# Patient Record
Sex: MALE | Race: OTHER | NOT HISPANIC OR LATINO
[De-identification: names, ages, dates, MRNs, and addresses within clinical notes are randomized per-mention and may not be internally consistent; named-entity substitution may affect disease eponyms.]

---

## 2019-06-26 DIAGNOSIS — Z00.00 ENCOUNTER FOR GENERAL ADULT MEDICAL EXAMINATION W/OUT ABNORMAL FINDINGS: ICD-10-CM

## 2019-06-26 DIAGNOSIS — H57.9 UNSPECIFIED DISORDER OF EYE AND ADNEXA: ICD-10-CM

## 2019-08-16 ENCOUNTER — MEDICATION RENEWAL (OUTPATIENT)
Age: 79
End: 2019-08-16

## 2019-09-19 ENCOUNTER — RX RENEWAL (OUTPATIENT)
Age: 79
End: 2019-09-19

## 2019-10-15 ENCOUNTER — RX RENEWAL (OUTPATIENT)
Age: 79
End: 2019-10-15

## 2019-10-25 ENCOUNTER — RX RENEWAL (OUTPATIENT)
Age: 79
End: 2019-10-25

## 2019-11-13 ENCOUNTER — RX RENEWAL (OUTPATIENT)
Age: 79
End: 2019-11-13

## 2019-11-20 ENCOUNTER — APPOINTMENT (OUTPATIENT)
Dept: CARDIOLOGY | Facility: CLINIC | Age: 79
End: 2019-11-20

## 2019-12-17 ENCOUNTER — RX RENEWAL (OUTPATIENT)
Age: 79
End: 2019-12-17

## 2020-01-14 ENCOUNTER — APPOINTMENT (OUTPATIENT)
Dept: CARDIOLOGY | Facility: CLINIC | Age: 80
End: 2020-01-14

## 2020-02-19 ENCOUNTER — APPOINTMENT (OUTPATIENT)
Dept: CARDIOLOGY | Facility: CLINIC | Age: 80
End: 2020-02-19

## 2020-06-17 ENCOUNTER — APPOINTMENT (OUTPATIENT)
Dept: CARDIOLOGY | Facility: CLINIC | Age: 80
End: 2020-06-17

## 2020-09-07 ENCOUNTER — RX RENEWAL (OUTPATIENT)
Age: 80
End: 2020-09-07

## 2020-09-15 ENCOUNTER — RX RENEWAL (OUTPATIENT)
Age: 80
End: 2020-09-15

## 2020-09-18 ENCOUNTER — LABORATORY RESULT (OUTPATIENT)
Age: 80
End: 2020-09-18

## 2020-09-18 ENCOUNTER — NON-APPOINTMENT (OUTPATIENT)
Age: 80
End: 2020-09-18

## 2020-09-18 ENCOUNTER — APPOINTMENT (OUTPATIENT)
Dept: CARDIOLOGY | Facility: CLINIC | Age: 80
End: 2020-09-18
Payer: MEDICARE

## 2020-09-18 VITALS
WEIGHT: 155 LBS | HEIGHT: 68 IN | HEART RATE: 57 BPM | SYSTOLIC BLOOD PRESSURE: 108 MMHG | OXYGEN SATURATION: 96 % | BODY MASS INDEX: 23.49 KG/M2 | DIASTOLIC BLOOD PRESSURE: 56 MMHG

## 2020-09-18 DIAGNOSIS — Z12.5 ENCOUNTER FOR SCREENING FOR MALIGNANT NEOPLASM OF PROSTATE: ICD-10-CM

## 2020-09-18 DIAGNOSIS — Z82.49 FAMILY HISTORY OF ISCHEMIC HEART DISEASE AND OTHER DISEASES OF THE CIRCULATORY SYSTEM: ICD-10-CM

## 2020-09-18 DIAGNOSIS — Z20.828 CONTACT WITH AND (SUSPECTED) EXPOSURE TO OTHER VIRAL COMMUNICABLE DISEASES: ICD-10-CM

## 2020-09-18 DIAGNOSIS — R00.1 BRADYCARDIA, UNSPECIFIED: ICD-10-CM

## 2020-09-18 DIAGNOSIS — Z86.79 PERSONAL HISTORY OF OTHER DISEASES OF THE CIRCULATORY SYSTEM: ICD-10-CM

## 2020-09-18 DIAGNOSIS — Z78.9 OTHER SPECIFIED HEALTH STATUS: ICD-10-CM

## 2020-09-18 DIAGNOSIS — Z82.5 FAMILY HISTORY OF ASTHMA AND OTHER CHRONIC LOWER RESPIRATORY DISEASES: ICD-10-CM

## 2020-09-18 PROCEDURE — 90662 IIV NO PRSV INCREASED AG IM: CPT

## 2020-09-18 PROCEDURE — G0008: CPT

## 2020-09-18 PROCEDURE — 99397 PER PM REEVAL EST PAT 65+ YR: CPT

## 2020-09-18 PROCEDURE — 93000 ELECTROCARDIOGRAM COMPLETE: CPT

## 2020-09-18 RX ORDER — ZOSTER VACCINE RECOMBINANT, ADJUVANTED 50 MCG/0.5
50 KIT INTRAMUSCULAR
Qty: 2 | Refills: 0 | Status: ACTIVE | COMMUNITY
Start: 2020-09-18 | End: 1900-01-01

## 2020-09-18 NOTE — HISTORY OF PRESENT ILLNESS
[FreeTextEntry1] : Annual Wellness Exam  [de-identified] : This is an 80 year old gentlemen with a PMH of HTN, HLD, BPH, VitamiN D Deficiency, Spinal Stenosis, and CAD presents today for annual wellness exam. Patient states that he is overall feeling good, however, he has been feeling more exhausted for the past few months. Patient denies dyspnea, palpitations, chest pain, nausea, vomiting, dizziness and lightheadedness.\par

## 2020-09-19 ENCOUNTER — APPOINTMENT (OUTPATIENT)
Dept: CARDIOLOGY | Facility: CLINIC | Age: 80
End: 2020-09-19
Payer: MEDICARE

## 2020-09-19 PROCEDURE — 93306 TTE W/DOPPLER COMPLETE: CPT

## 2020-12-14 DIAGNOSIS — Z71.89 OTHER SPECIFIED COUNSELING: ICD-10-CM

## 2020-12-27 PROBLEM — Z71.89 IMMUNIZATION COUNSELING: Status: ACTIVE | Noted: 2020-12-27

## 2020-12-27 LAB
25(OH)D3 SERPL-MCNC: 49.5 NG/ML
ALBUMIN SERPL ELPH-MCNC: 4.7 G/DL
ALP BLD-CCNC: 77 U/L
ALT SERPL-CCNC: 22 U/L
ANION GAP SERPL CALC-SCNC: 14 MMOL/L
APPEARANCE: CLEAR
AST SERPL-CCNC: 32 U/L
BACTERIA: NEGATIVE
BASOPHILS # BLD AUTO: 0.05 K/UL
BASOPHILS NFR BLD AUTO: 0.8 %
BILIRUB DIRECT SERPL-MCNC: 0.1 MG/DL
BILIRUB INDIRECT SERPL-MCNC: 0.1 MG/DL
BILIRUB SERPL-MCNC: 0.2 MG/DL
BILIRUBIN URINE: NEGATIVE
BLOOD URINE: NEGATIVE
BUN SERPL-MCNC: 17 MG/DL
CALCIUM SERPL-MCNC: 10.1 MG/DL
CHLORIDE SERPL-SCNC: 103 MMOL/L
CHOLEST SERPL-MCNC: 166 MG/DL
CHOLEST/HDLC SERPL: 2.2 RATIO
CK SERPL-CCNC: 195 U/L
CO2 SERPL-SCNC: 25 MMOL/L
COLOR: NORMAL
CREAT SERPL-MCNC: 1.04 MG/DL
EOSINOPHIL # BLD AUTO: 0.24 K/UL
EOSINOPHIL NFR BLD AUTO: 3.8 %
ESTIMATED AVERAGE GLUCOSE: 123 MG/DL
FERRITIN SERPL-MCNC: 88 NG/ML
FOLATE SERPL-MCNC: 19.8 NG/ML
GLUCOSE QUALITATIVE U: NEGATIVE
GLUCOSE SERPL-MCNC: 98 MG/DL
HAPTOGLOB SERPL-MCNC: 105 MG/DL
HBA1C MFR BLD HPLC: 5.9 %
HCT VFR BLD CALC: 40.1 %
HDLC SERPL-MCNC: 74 MG/DL
HGB BLD-MCNC: 12.7 G/DL
HYALINE CASTS: 0 /LPF
IMM GRANULOCYTES NFR BLD AUTO: 0.3 %
IRON SERPL-MCNC: 79 UG/DL
KETONES URINE: NEGATIVE
LDH SERPL-CCNC: 205 U/L
LDLC SERPL CALC-MCNC: 72 MG/DL
LEUKOCYTE ESTERASE URINE: NEGATIVE
LYMPHOCYTES # BLD AUTO: 1.12 K/UL
LYMPHOCYTES NFR BLD AUTO: 17.5 %
MAGNESIUM SERPL-MCNC: 2.3 MG/DL
MAN DIFF?: NORMAL
MCHC RBC-ENTMCNC: 30.5 PG
MCHC RBC-ENTMCNC: 31.7 GM/DL
MCV RBC AUTO: 96.2 FL
MICROSCOPIC-UA: NORMAL
MONOCYTES # BLD AUTO: 0.56 K/UL
MONOCYTES NFR BLD AUTO: 8.8 %
NEUTROPHILS # BLD AUTO: 4.4 K/UL
NEUTROPHILS NFR BLD AUTO: 68.8 %
NITRITE URINE: NEGATIVE
PH URINE: 7.5
PHOSPHATE SERPL-MCNC: 3.4 MG/DL
PLATELET # BLD AUTO: 235 K/UL
POTASSIUM SERPL-SCNC: 5.2 MMOL/L
PROT SERPL-MCNC: 7.7 G/DL
PROTEIN URINE: NEGATIVE
PSA SERPL-MCNC: 8.74 NG/ML
RBC # BLD: 4.17 M/UL
RBC # FLD: 14.2 %
RED BLOOD CELLS URINE: 1 /HPF
SARS-COV-2 IGG SERPL IA-ACNC: 0.02 INDEX
SARS-COV-2 IGG SERPL QL IA: NEGATIVE
SODIUM SERPL-SCNC: 142 MMOL/L
SPECIFIC GRAVITY URINE: 1.02
SQUAMOUS EPITHELIAL CELLS: 0 /HPF
T3RU NFR SERPL: 1.1 TBI
T4 FREE SERPL-MCNC: 1 NG/DL
T4 SERPL-MCNC: 5 UG/DL
TRANSFERRIN SERPL-MCNC: 272 MG/DL
TRIGL SERPL-MCNC: 100 MG/DL
TSH SERPL-ACNC: 2.79 UIU/ML
URATE SERPL-MCNC: 4.9 MG/DL
UROBILINOGEN URINE: NORMAL
VIT B12 SERPL-MCNC: >2000 PG/ML
WBC # FLD AUTO: 6.39 K/UL
WHITE BLOOD CELLS URINE: 0 /HPF

## 2020-12-27 RX ORDER — ZOSTER VACCINE RECOMBINANT, ADJUVANTED 50 MCG/0.5
50 KIT INTRAMUSCULAR
Qty: 1 | Refills: 0 | Status: ACTIVE | COMMUNITY
Start: 2020-12-27 | End: 1900-01-01

## 2021-10-08 ENCOUNTER — APPOINTMENT (OUTPATIENT)
Dept: CARDIOLOGY | Facility: CLINIC | Age: 81
End: 2021-10-08
Payer: MEDICARE

## 2021-10-08 ENCOUNTER — LABORATORY RESULT (OUTPATIENT)
Age: 81
End: 2021-10-08

## 2021-10-08 ENCOUNTER — NON-APPOINTMENT (OUTPATIENT)
Age: 81
End: 2021-10-08

## 2021-10-08 VITALS
SYSTOLIC BLOOD PRESSURE: 148 MMHG | OXYGEN SATURATION: 96 % | TEMPERATURE: 97.8 F | HEART RATE: 72 BPM | DIASTOLIC BLOOD PRESSURE: 86 MMHG | WEIGHT: 153 LBS | BODY MASS INDEX: 23.26 KG/M2

## 2021-10-08 DIAGNOSIS — Z23 ENCOUNTER FOR IMMUNIZATION: ICD-10-CM

## 2021-10-08 DIAGNOSIS — Z00.00 ENCOUNTER FOR GENERAL ADULT MEDICAL EXAMINATION W/OUT ABNORMAL FINDINGS: ICD-10-CM

## 2021-10-08 DIAGNOSIS — Z12.83 ENCOUNTER FOR SCREENING FOR MALIGNANT NEOPLASM OF SKIN: ICD-10-CM

## 2021-10-08 DIAGNOSIS — Z12.11 ENCOUNTER FOR SCREENING FOR MALIGNANT NEOPLASM OF COLON: ICD-10-CM

## 2021-10-08 PROCEDURE — 99397 PER PM REEVAL EST PAT 65+ YR: CPT

## 2021-10-08 PROCEDURE — 90662 IIV NO PRSV INCREASED AG IM: CPT

## 2021-10-08 PROCEDURE — G0008: CPT

## 2021-10-08 PROCEDURE — 93000 ELECTROCARDIOGRAM COMPLETE: CPT

## 2021-10-08 RX ORDER — ASPIRIN 81 MG/1
81 TABLET, COATED ORAL
Refills: 0 | Status: ACTIVE | COMMUNITY
Start: 2021-10-08

## 2021-10-08 NOTE — HISTORY OF PRESENT ILLNESS
[FreeTextEntry1] : Annual Wellness exam  [de-identified] : This is an 81 year old gentlemen with a PMH of   HTN, HLD, BPH, VitamiN D Deficiency, Spinal Stenosis, and CAD presents today for annual wellness exam. Patient states that he is overall feeling good.  Patient denies dyspnea, palpitations, chest pain, nausea, vomiting, dizziness and lightheadedness.\par  \par

## 2022-01-15 ENCOUNTER — TRANSCRIPTION ENCOUNTER (OUTPATIENT)
Age: 82
End: 2022-01-15

## 2022-06-15 ENCOUNTER — TRANSCRIPTION ENCOUNTER (OUTPATIENT)
Age: 82
End: 2022-06-15

## 2022-08-26 ENCOUNTER — NON-APPOINTMENT (OUTPATIENT)
Age: 82
End: 2022-08-26

## 2023-01-19 ENCOUNTER — RX RENEWAL (OUTPATIENT)
Age: 83
End: 2023-01-19

## 2023-02-21 ENCOUNTER — APPOINTMENT (OUTPATIENT)
Dept: CARDIOLOGY | Facility: CLINIC | Age: 83
End: 2023-02-21

## 2023-02-24 ENCOUNTER — APPOINTMENT (OUTPATIENT)
Dept: INTERNAL MEDICINE | Facility: CLINIC | Age: 83
End: 2023-02-24
Payer: MEDICARE

## 2023-02-24 ENCOUNTER — NON-APPOINTMENT (OUTPATIENT)
Age: 83
End: 2023-02-24

## 2023-02-24 VITALS
SYSTOLIC BLOOD PRESSURE: 122 MMHG | HEIGHT: 68 IN | HEART RATE: 81 BPM | DIASTOLIC BLOOD PRESSURE: 76 MMHG | OXYGEN SATURATION: 98 % | WEIGHT: 146 LBS | BODY MASS INDEX: 22.13 KG/M2

## 2023-02-24 PROCEDURE — 99204 OFFICE O/P NEW MOD 45 MIN: CPT | Mod: 25

## 2023-02-24 PROCEDURE — 93000 ELECTROCARDIOGRAM COMPLETE: CPT

## 2023-02-24 RX ORDER — FLUOXETINE HYDROCHLORIDE 20 MG/1
20 CAPSULE ORAL
Qty: 90 | Refills: 0 | Status: ACTIVE | COMMUNITY
Start: 2019-08-16 | End: 1900-01-01

## 2023-02-24 NOTE — HISTORY OF PRESENT ILLNESS
[Family Member] : family member [FreeTextEntry1] : follow-up [de-identified] : 82 year old male with PMHx of CAD, HTN, HLD, BPH with elevated PSA who presents for a f/u visit (first visit with me). Currently he has no acute medical complaints and states he has been compliant with all medications.

## 2023-02-24 NOTE — PLAN
[FreeTextEntry1] : CAD s/p PCI - c/w ASA 81 mg, refer for ECHO, f/u with cardiology\par HTN - diet controlled\par HLD - c/w zocor, repeat lipid panel\par Dyspepsia - pt requests pantoprazole\par Anxiety - c/w fluoxetine\par BPH w/ elevated PSA - pt f/u with outside urologist, c/w tamsulosin and finasteride, repeat PSA

## 2023-02-24 NOTE — REVIEW OF SYSTEMS
[Fever] : no fever [Night Sweats] : no night sweats [Discharge] : no discharge [Vision Problems] : no vision problems [Earache] : no earache [Nasal Discharge] : no nasal discharge [Chest Pain] : no chest pain [Orthopena] : no orthopnea [Shortness Of Breath] : no shortness of breath [Abdominal Pain] : no abdominal pain [Vomiting] : no vomiting [Dysuria] : no dysuria [Hematuria] : no hematuria [Joint Pain] : no joint pain [Back Pain] : no back pain [Itching] : no itching [Skin Rash] : no skin rash [Headache] : no headache [Memory Loss] : no memory loss

## 2023-02-24 NOTE — PHYSICAL EXAM
[No Acute Distress] : no acute distress [Normal Sclera/Conjunctiva] : normal sclera/conjunctiva [Normal Outer Ear/Nose] : the outer ears and nose were normal in appearance [No JVD] : no jugular venous distention [No Lymphadenopathy] : no lymphadenopathy [No Respiratory Distress] : no respiratory distress  [No Accessory Muscle Use] : no accessory muscle use [Clear to Auscultation] : lungs were clear to auscultation bilaterally [Normal Rate] : normal rate  [Regular Rhythm] : with a regular rhythm [Normal S1, S2] : normal S1 and S2 [No Edema] : there was no peripheral edema [Soft] : abdomen soft [Non Tender] : non-tender [No CVA Tenderness] : no CVA  tenderness [No Spinal Tenderness] : no spinal tenderness [No Joint Swelling] : no joint swelling [No Rash] : no rash

## 2023-02-27 ENCOUNTER — APPOINTMENT (OUTPATIENT)
Dept: CARDIOLOGY | Facility: CLINIC | Age: 83
End: 2023-02-27

## 2023-03-01 ENCOUNTER — NON-APPOINTMENT (OUTPATIENT)
Age: 83
End: 2023-03-01

## 2023-03-01 LAB
ALBUMIN SERPL ELPH-MCNC: 4.5 G/DL
ALP BLD-CCNC: 98 U/L
ALT SERPL-CCNC: 20 U/L
ANION GAP SERPL CALC-SCNC: 12 MMOL/L
AST SERPL-CCNC: 27 U/L
BASOPHILS # BLD AUTO: 0.04 K/UL
BASOPHILS NFR BLD AUTO: 0.7 %
BILIRUB DIRECT SERPL-MCNC: 0.1 MG/DL
BILIRUB INDIRECT SERPL-MCNC: 0.1 MG/DL
BILIRUB SERPL-MCNC: 0.2 MG/DL
BUN SERPL-MCNC: 12 MG/DL
CALCIUM SERPL-MCNC: 9.9 MG/DL
CHLORIDE SERPL-SCNC: 104 MMOL/L
CHOLEST SERPL-MCNC: 189 MG/DL
CO2 SERPL-SCNC: 27 MMOL/L
CREAT SERPL-MCNC: 1.06 MG/DL
EGFR: 69 ML/MIN/1.73M2
EOSINOPHIL # BLD AUTO: 0.11 K/UL
EOSINOPHIL NFR BLD AUTO: 2 %
ESTIMATED AVERAGE GLUCOSE: 123 MG/DL
GLUCOSE SERPL-MCNC: 97 MG/DL
HBA1C MFR BLD HPLC: 5.9 %
HCT VFR BLD CALC: 41 %
HDLC SERPL-MCNC: 72 MG/DL
HGB BLD-MCNC: 13.1 G/DL
IMM GRANULOCYTES NFR BLD AUTO: 0.2 %
LDLC SERPL CALC-MCNC: 95 MG/DL
LYMPHOCYTES # BLD AUTO: 0.94 K/UL
LYMPHOCYTES NFR BLD AUTO: 17.2 %
MAN DIFF?: NORMAL
MCHC RBC-ENTMCNC: 30.7 PG
MCHC RBC-ENTMCNC: 32 GM/DL
MCV RBC AUTO: 96 FL
MONOCYTES # BLD AUTO: 0.57 K/UL
MONOCYTES NFR BLD AUTO: 10.5 %
NEUTROPHILS # BLD AUTO: 3.78 K/UL
NEUTROPHILS NFR BLD AUTO: 69.4 %
NONHDLC SERPL-MCNC: 117 MG/DL
PLATELET # BLD AUTO: 240 K/UL
POTASSIUM SERPL-SCNC: 4.8 MMOL/L
PROT SERPL-MCNC: 6.8 G/DL
PSA FREE FLD-MCNC: 14 %
PSA FREE SERPL-MCNC: 4.78 NG/ML
PSA SERPL-MCNC: 35.1 NG/ML
RBC # BLD: 4.27 M/UL
RBC # FLD: 14.6 %
SODIUM SERPL-SCNC: 143 MMOL/L
TRIGL SERPL-MCNC: 109 MG/DL
TSH SERPL-ACNC: 3.4 UIU/ML
WBC # FLD AUTO: 5.45 K/UL

## 2023-08-23 ENCOUNTER — APPOINTMENT (OUTPATIENT)
Dept: INTERNAL MEDICINE | Facility: CLINIC | Age: 83
End: 2023-08-23
Payer: MEDICARE

## 2023-08-23 VITALS
WEIGHT: 140 LBS | DIASTOLIC BLOOD PRESSURE: 72 MMHG | BODY MASS INDEX: 21.22 KG/M2 | RESPIRATION RATE: 16 BRPM | SYSTOLIC BLOOD PRESSURE: 122 MMHG | HEART RATE: 77 BPM | HEIGHT: 68 IN | TEMPERATURE: 97.8 F | OXYGEN SATURATION: 98 %

## 2023-08-23 DIAGNOSIS — M54.50 LOW BACK PAIN, UNSPECIFIED: ICD-10-CM

## 2023-08-23 DIAGNOSIS — R10.13 EPIGASTRIC PAIN: ICD-10-CM

## 2023-08-23 PROCEDURE — 99214 OFFICE O/P EST MOD 30 MIN: CPT

## 2023-08-23 NOTE — PHYSICAL EXAM
[No Acute Distress] : no acute distress [Normal Sclera/Conjunctiva] : normal sclera/conjunctiva [Normal Outer Ear/Nose] : the outer ears and nose were normal in appearance [No JVD] : no jugular venous distention [No Lymphadenopathy] : no lymphadenopathy [No Respiratory Distress] : no respiratory distress  [No Accessory Muscle Use] : no accessory muscle use [Clear to Auscultation] : lungs were clear to auscultation bilaterally [Normal Rate] : normal rate  [Regular Rhythm] : with a regular rhythm [Normal S1, S2] : normal S1 and S2 [No Edema] : there was no peripheral edema [Soft] : abdomen soft [No CVA Tenderness] : no CVA  tenderness [No Joint Swelling] : no joint swelling [No Rash] : no rash [de-identified] : slow to get up from seated position

## 2023-08-23 NOTE — HISTORY OF PRESENT ILLNESS
[Family Member] : family member [FreeTextEntry1] : back pain [de-identified] : 83 year old male here for a f/u visit. Pt c/o low back pain for several months. The pain is worse when he bends to lift items and getting up from a seated/lying down position. He denies any chest pain, palpitations, shortness of breath.  He takes ibuprofen for pain relief with resolution of symptoms.

## 2023-08-23 NOTE — REVIEW OF SYSTEMS
[Fever] : no fever [Night Sweats] : no night sweats [Discharge] : no discharge [Vision Problems] : no vision problems [Earache] : no earache [Nasal Discharge] : no nasal discharge [Chest Pain] : no chest pain [Palpitations] : no palpitations [Orthopena] : no orthopnea [Paroxysmal Nocturnal Dyspnea] : no paroxysmal nocturnal dyspnea [Shortness Of Breath] : no shortness of breath [Wheezing] : no wheezing [Heartburn] : heartburn [Dysuria] : no dysuria [Hematuria] : no hematuria [Joint Pain] : no joint pain [Joint Stiffness] : joint stiffness [Back Pain] : back pain [Joint Swelling] : joint swelling [Itching] : no itching [Skin Rash] : no skin rash [Headache] : no headache [Memory Loss] : no memory loss [Suicidal] : not suicidal [Easy Bleeding] : no easy bleeding

## 2023-08-23 NOTE — PLAN
[FreeTextEntry1] : Low back pain/spinal stenosis - will check xrays, advise NSAIDs, consider PT referral if xrays are negative Elevated PSA - pt reports he follows with Dr. Potts (had appt earlier this year). Will repeat PSA today.  c/w finasteride/tamsulosin HLD - c/w zocor, check lipid panel Dyspepsia - pt requests pantoprazole, refer to GI as well HMT - will check screening labs and f/u

## 2023-08-25 ENCOUNTER — OUTPATIENT (OUTPATIENT)
Dept: OUTPATIENT SERVICES | Facility: HOSPITAL | Age: 83
LOS: 1 days | End: 2023-08-25
Payer: MEDICARE

## 2023-08-25 DIAGNOSIS — M54.50 LOW BACK PAIN, UNSPECIFIED: ICD-10-CM

## 2023-08-25 LAB
ALBUMIN SERPL ELPH-MCNC: 4.4 G/DL
ALP BLD-CCNC: 110 U/L
ALT SERPL-CCNC: 14 U/L
ANION GAP SERPL CALC-SCNC: 17 MMOL/L
APPEARANCE: CLEAR
AST SERPL-CCNC: 23 U/L
BACTERIA: NEGATIVE /HPF
BILIRUB DIRECT SERPL-MCNC: 0.1 MG/DL
BILIRUB INDIRECT SERPL-MCNC: 0.3 MG/DL
BILIRUB SERPL-MCNC: 0.4 MG/DL
BILIRUBIN URINE: NEGATIVE
BLOOD URINE: NEGATIVE
BUN SERPL-MCNC: 15 MG/DL
CALCIUM SERPL-MCNC: 9.8 MG/DL
CAST: 0 /LPF
CHLORIDE SERPL-SCNC: 102 MMOL/L
CHOLEST SERPL-MCNC: 161 MG/DL
CO2 SERPL-SCNC: 24 MMOL/L
COLOR: YELLOW
CREAT SERPL-MCNC: 1.12 MG/DL
EGFR: 64 ML/MIN/1.73M2
EPITHELIAL CELLS: 1 /HPF
ESTIMATED AVERAGE GLUCOSE: 117 MG/DL
GLUCOSE QUALITATIVE U: NEGATIVE MG/DL
GLUCOSE SERPL-MCNC: 123 MG/DL
HBA1C MFR BLD HPLC: 5.7 %
HDLC SERPL-MCNC: 78 MG/DL
KETONES URINE: ABNORMAL MG/DL
LDLC SERPL CALC-MCNC: 71 MG/DL
LEUKOCYTE ESTERASE URINE: NEGATIVE
MICROSCOPIC-UA: NORMAL
NITRITE URINE: NEGATIVE
NONHDLC SERPL-MCNC: 83 MG/DL
PH URINE: 6
POTASSIUM SERPL-SCNC: 3.8 MMOL/L
PROT SERPL-MCNC: 7.7 G/DL
PROTEIN URINE: NEGATIVE MG/DL
PSA FREE FLD-MCNC: 17 %
PSA FREE SERPL-MCNC: 14.1 NG/ML
PSA SERPL-MCNC: 83.7 NG/ML
RED BLOOD CELLS URINE: 1 /HPF
SODIUM SERPL-SCNC: 143 MMOL/L
SPECIFIC GRAVITY URINE: 1.01
TRIGL SERPL-MCNC: 62 MG/DL
TSH SERPL-ACNC: 4.36 UIU/ML
UROBILINOGEN URINE: 1 MG/DL
WHITE BLOOD CELLS URINE: 1 /HPF

## 2023-08-25 PROCEDURE — 72110 X-RAY EXAM L-2 SPINE 4/>VWS: CPT | Mod: 26

## 2023-09-07 ENCOUNTER — APPOINTMENT (OUTPATIENT)
Dept: GASTROENTEROLOGY | Facility: CLINIC | Age: 83
End: 2023-09-07
Payer: MEDICARE

## 2023-09-07 VITALS
TEMPERATURE: 98 F | OXYGEN SATURATION: 98 % | DIASTOLIC BLOOD PRESSURE: 68 MMHG | BODY MASS INDEX: 22.58 KG/M2 | HEIGHT: 68 IN | SYSTOLIC BLOOD PRESSURE: 116 MMHG | HEART RATE: 85 BPM | WEIGHT: 149 LBS | RESPIRATION RATE: 16 BRPM

## 2023-09-07 DIAGNOSIS — N40.1 BENIGN PROSTATIC HYPERPLASIA WITH LOWER URINARY TRACT SYMPMS: ICD-10-CM

## 2023-09-07 DIAGNOSIS — R13.14 DYSPHAGIA, PHARYNGOESOPHAGEAL PHASE: ICD-10-CM

## 2023-09-07 DIAGNOSIS — Z86.39 PERSONAL HISTORY OF OTHER ENDOCRINE, NUTRITIONAL AND METABOLIC DISEASE: ICD-10-CM

## 2023-09-07 PROCEDURE — 99204 OFFICE O/P NEW MOD 45 MIN: CPT

## 2023-09-07 NOTE — CONSULT LETTER
[Dear  ___] : Dear  [unfilled], [Consult Letter:] : I had the pleasure of evaluating your patient, [unfilled]. [( Thank you for referring [unfilled] for consultation for _____ )] : Thank you for referring [unfilled] for consultation for [unfilled] [Please see my note below.] : Please see my note below. [Consult Closing:] : Thank you very much for allowing me to participate in the care of this patient.  If you have any questions, please do not hesitate to contact me. [Sincerely,] : Sincerely, [FreeTextEntry3] : Donald Sifuentes MD  Gastroenterology [facility]

## 2023-09-07 NOTE — HISTORY OF PRESENT ILLNESS
[FreeTextEntry1] : He is an 83-year-old male with a long history of intermittent difficulty swallowing solid food.  The food gets stuck in the back of his pharynx.  He is able to drink liquids.  He also admits to heartburn.  He is on  on pantoprazole 40 mg daily.  He denies abdominal pain.  He had an endoscopy 20 years ago and had a dilation of his esophagus.  His son was in the room

## 2023-09-07 NOTE — ASSESSMENT
[FreeTextEntry1] : He is an 83-year-old male with a long history of solid food intermittently getting stuck in the back of his pharynx which is worse recently.  He most likely has a Zenker's diverticulum.  However, we must rule out a structural lesion or stricture.  He also  may have lost the ability to coordinate his pharyngeal and esophageal contractions resulting in food getting stuck in the back of his pharynx.  WHIT MATTHEWS was advised to undergo endoscopy to which he agreed. The procedure will be performed in [facility] with the assistance of an anesthesiologist. He was given a booklet distributed by the [employer] explaining the procedure in detail and he understood the risks of the procedure not limited to infection, bleeding, perforation or non- diagnosis of gastric or esophageal cancer.  He was advised that he could not drive home, if he chooses to receive sedation. Further diagnostic and treatment recommendations will be based upon the procedure and any biopsies, if they are taken. Thank you for allowing me to participate in this [facility] care.   I spent 48 minutes with the patient and his son and answered all the questions

## 2023-09-08 ENCOUNTER — APPOINTMENT (OUTPATIENT)
Dept: UROLOGY | Facility: CLINIC | Age: 83
End: 2023-09-08
Payer: MEDICARE

## 2023-09-08 VITALS
HEIGHT: 68 IN | TEMPERATURE: 97.2 F | HEART RATE: 74 BPM | DIASTOLIC BLOOD PRESSURE: 85 MMHG | WEIGHT: 150 LBS | BODY MASS INDEX: 22.73 KG/M2 | RESPIRATION RATE: 16 BRPM | SYSTOLIC BLOOD PRESSURE: 134 MMHG

## 2023-09-08 PROCEDURE — 99204 OFFICE O/P NEW MOD 45 MIN: CPT

## 2023-09-08 PROCEDURE — 51798 US URINE CAPACITY MEASURE: CPT

## 2023-09-09 LAB
APPEARANCE: CLEAR
BACTERIA: NEGATIVE /HPF
BILIRUBIN URINE: NEGATIVE
BLOOD URINE: NEGATIVE
CAST: 2 /LPF
COLOR: NORMAL
EPITHELIAL CELLS: 2 /HPF
GLUCOSE QUALITATIVE U: NEGATIVE MG/DL
KETONES URINE: ABNORMAL MG/DL
LEUKOCYTE ESTERASE URINE: NEGATIVE
MICROSCOPIC-UA: NORMAL
NITRITE URINE: NEGATIVE
PH URINE: 5.5
PROTEIN URINE: NORMAL MG/DL
RED BLOOD CELLS URINE: 1 /HPF
SPECIFIC GRAVITY URINE: 1.02
UROBILINOGEN URINE: 1 MG/DL
WHITE BLOOD CELLS URINE: 1 /HPF

## 2023-09-09 NOTE — HISTORY OF PRESENT ILLNESS
[FreeTextEntry1] : referred for evaluation of an elevated PSA presently 81; had been 35 2/23 and 11.8 2021 never had a PNB before. has lost 10+ lbs though his appetite is down and complains of LBP for the past month. he is on finasterdie and tamsulosin for LUTs.  he notes frequency every 2 hiurs, some urgency and can have leakage with nocturia 3-4 times. he FOS slower with some hesitancy and intermittency. No h/o retention, hematuria or UTIs.   PVR 8cc.

## 2023-09-09 NOTE — ASSESSMENT
[FreeTextEntry1] : based on PSA level on finasteride would be concerned about prostate cancer, likely advanced. plan for expedited MRI pelvis and needs biopsy. reviewed therapies with ADT the foremost though he is 83 so side effects less.

## 2023-09-10 LAB — BACTERIA UR CULT: NORMAL

## 2023-09-11 RX ORDER — NAPROXEN 250 MG/1
250 TABLET ORAL
Qty: 30 | Refills: 0 | Status: ACTIVE | COMMUNITY
Start: 2023-08-23 | End: 1900-01-01

## 2023-09-21 ENCOUNTER — APPOINTMENT (OUTPATIENT)
Dept: INTERNAL MEDICINE | Facility: CLINIC | Age: 83
End: 2023-09-21
Payer: MEDICARE

## 2023-09-21 VITALS
BODY MASS INDEX: 22.73 KG/M2 | OXYGEN SATURATION: 98 % | SYSTOLIC BLOOD PRESSURE: 112 MMHG | WEIGHT: 150 LBS | HEART RATE: 80 BPM | HEIGHT: 68 IN | DIASTOLIC BLOOD PRESSURE: 80 MMHG

## 2023-09-21 DIAGNOSIS — R53.83 OTHER FATIGUE: ICD-10-CM

## 2023-09-21 DIAGNOSIS — R35.1 BENIGN PROSTATIC HYPERPLASIA WITH LOWER URINARY TRACT SYMPMS: ICD-10-CM

## 2023-09-21 DIAGNOSIS — N40.1 BENIGN PROSTATIC HYPERPLASIA WITH LOWER URINARY TRACT SYMPMS: ICD-10-CM

## 2023-09-21 PROCEDURE — 99496 TRANSJ CARE MGMT HIGH F2F 7D: CPT

## 2023-09-21 PROCEDURE — 99214 OFFICE O/P EST MOD 30 MIN: CPT

## 2023-09-28 ENCOUNTER — OUTPATIENT (OUTPATIENT)
Dept: OUTPATIENT SERVICES | Facility: HOSPITAL | Age: 83
LOS: 1 days | End: 2023-09-28
Payer: MEDICARE

## 2023-09-28 ENCOUNTER — RESULT REVIEW (OUTPATIENT)
Age: 83
End: 2023-09-28

## 2023-09-28 ENCOUNTER — APPOINTMENT (OUTPATIENT)
Dept: MRI IMAGING | Facility: CLINIC | Age: 83
End: 2023-09-28
Payer: MEDICARE

## 2023-09-28 DIAGNOSIS — R97.20 ELEVATED PROSTATE SPECIFIC ANTIGEN [PSA]: ICD-10-CM

## 2023-09-28 PROCEDURE — 72197 MRI PELVIS W/O & W/DYE: CPT | Mod: 26

## 2023-09-28 PROCEDURE — 72197 MRI PELVIS W/O & W/DYE: CPT

## 2023-09-28 PROCEDURE — A9585: CPT

## 2023-09-28 PROCEDURE — 76498 UNLISTED MR PROCEDURE: CPT

## 2023-09-28 PROCEDURE — 76498P: CUSTOM | Mod: 26

## 2023-10-02 ENCOUNTER — RX RENEWAL (OUTPATIENT)
Age: 83
End: 2023-10-02

## 2023-10-16 ENCOUNTER — APPOINTMENT (OUTPATIENT)
Dept: GASTROENTEROLOGY | Facility: AMBULATORY SURGERY CENTER | Age: 83
End: 2023-10-16
Payer: MEDICARE

## 2023-10-16 ENCOUNTER — RESULT REVIEW (OUTPATIENT)
Age: 83
End: 2023-10-16

## 2023-10-16 PROCEDURE — 43239 EGD BIOPSY SINGLE/MULTIPLE: CPT

## 2023-10-16 RX ORDER — PANTOPRAZOLE 40 MG/1
40 TABLET, DELAYED RELEASE ORAL
Qty: 30 | Refills: 4 | Status: ACTIVE | COMMUNITY
Start: 2023-10-16 | End: 1900-01-01

## 2023-10-26 ENCOUNTER — NON-APPOINTMENT (OUTPATIENT)
Age: 83
End: 2023-10-26

## 2023-10-31 ENCOUNTER — APPOINTMENT (OUTPATIENT)
Dept: UROLOGY | Facility: CLINIC | Age: 83
End: 2023-10-31
Payer: MEDICARE

## 2023-10-31 ENCOUNTER — OUTPATIENT (OUTPATIENT)
Dept: OUTPATIENT SERVICES | Facility: HOSPITAL | Age: 83
LOS: 1 days | End: 2023-10-31
Payer: MEDICARE

## 2023-10-31 VITALS — SYSTOLIC BLOOD PRESSURE: 108 MMHG | HEART RATE: 99 BPM | DIASTOLIC BLOOD PRESSURE: 75 MMHG

## 2023-10-31 VITALS — HEART RATE: 75 BPM | DIASTOLIC BLOOD PRESSURE: 80 MMHG | SYSTOLIC BLOOD PRESSURE: 120 MMHG

## 2023-10-31 DIAGNOSIS — R97.20 ELEVATED PROSTATE SPECIFIC ANTIGEN [PSA]: ICD-10-CM

## 2023-10-31 DIAGNOSIS — R35.0 FREQUENCY OF MICTURITION: ICD-10-CM

## 2023-10-31 PROCEDURE — 76377 3D RENDER W/INTRP POSTPROCES: CPT | Mod: 26

## 2023-10-31 PROCEDURE — 55700: CPT

## 2023-10-31 PROCEDURE — 76942 ECHO GUIDE FOR BIOPSY: CPT | Mod: 26,59

## 2023-10-31 PROCEDURE — 76942 ECHO GUIDE FOR BIOPSY: CPT | Mod: 59

## 2023-10-31 PROCEDURE — C8001: CPT

## 2023-11-01 ENCOUNTER — NON-APPOINTMENT (OUTPATIENT)
Age: 83
End: 2023-11-01

## 2023-11-01 DIAGNOSIS — R97.20 ELEVATED PROSTATE SPECIFIC ANTIGEN [PSA]: ICD-10-CM

## 2023-11-13 ENCOUNTER — APPOINTMENT (OUTPATIENT)
Dept: CARDIOLOGY | Facility: CLINIC | Age: 83
End: 2023-11-13
Payer: MEDICARE

## 2023-11-13 ENCOUNTER — APPOINTMENT (OUTPATIENT)
Dept: INTERNAL MEDICINE | Facility: CLINIC | Age: 83
End: 2023-11-13

## 2023-11-13 ENCOUNTER — NON-APPOINTMENT (OUTPATIENT)
Age: 83
End: 2023-11-13

## 2023-11-13 VITALS
HEART RATE: 86 BPM | TEMPERATURE: 98.8 F | BODY MASS INDEX: 18.04 KG/M2 | WEIGHT: 119 LBS | OXYGEN SATURATION: 97 % | DIASTOLIC BLOOD PRESSURE: 70 MMHG | HEIGHT: 68 IN | SYSTOLIC BLOOD PRESSURE: 110 MMHG

## 2023-11-13 DIAGNOSIS — Z13.228 ENCOUNTER FOR SCREENING FOR OTHER METABOLIC DISORDERS: ICD-10-CM

## 2023-11-13 DIAGNOSIS — M48.00 SPINAL STENOSIS, SITE UNSPECIFIED: ICD-10-CM

## 2023-11-13 DIAGNOSIS — E55.9 VITAMIN D DEFICIENCY, UNSPECIFIED: ICD-10-CM

## 2023-11-13 DIAGNOSIS — R97.20 ELEVATED PROSTATE, SPECIFIC ANTIGEN [PSA]: ICD-10-CM

## 2023-11-13 LAB — PROSTATE BIOPSY: NORMAL

## 2023-11-13 PROCEDURE — 93306 TTE W/DOPPLER COMPLETE: CPT

## 2023-11-13 PROCEDURE — 99214 OFFICE O/P EST MOD 30 MIN: CPT | Mod: 25

## 2023-11-13 PROCEDURE — 93000 ELECTROCARDIOGRAM COMPLETE: CPT

## 2023-11-13 RX ORDER — PREDNISONE 5 MG/1
5 TABLET ORAL TWICE DAILY
Qty: 60 | Refills: 5 | Status: ACTIVE | COMMUNITY
Start: 2023-11-13 | End: 1900-01-01

## 2023-11-13 RX ORDER — BICALUTAMIDE 50 MG/1
50 TABLET ORAL DAILY
Qty: 30 | Refills: 5 | Status: ACTIVE | COMMUNITY
Start: 2023-11-13 | End: 1900-01-01

## 2023-11-17 ENCOUNTER — NON-APPOINTMENT (OUTPATIENT)
Age: 83
End: 2023-11-17

## 2023-11-22 ENCOUNTER — APPOINTMENT (OUTPATIENT)
Dept: UROLOGY | Facility: CLINIC | Age: 83
End: 2023-11-22

## 2023-11-24 ENCOUNTER — APPOINTMENT (OUTPATIENT)
Dept: INTERNAL MEDICINE | Facility: CLINIC | Age: 83
End: 2023-11-24

## 2023-11-28 DIAGNOSIS — R79.89 OTHER SPECIFIED ABNORMAL FINDINGS OF BLOOD CHEMISTRY: ICD-10-CM

## 2023-11-28 DIAGNOSIS — R89.9 UNSPECIFIED ABNORMAL FINDING IN SPECIMENS FROM OTHER ORGANS, SYSTEMS AND TISSUES: ICD-10-CM

## 2023-11-28 DIAGNOSIS — R82.90 UNSPECIFIED ABNORMAL FINDINGS IN URINE: ICD-10-CM

## 2023-11-28 RX ORDER — CEFDINIR 300 MG/1
300 CAPSULE ORAL
Qty: 14 | Refills: 0 | Status: ACTIVE | COMMUNITY
Start: 2023-11-28 | End: 1900-01-01

## 2023-11-30 ENCOUNTER — OUTPATIENT (OUTPATIENT)
Dept: OUTPATIENT SERVICES | Facility: HOSPITAL | Age: 83
LOS: 1 days | Discharge: HOME | End: 2023-11-30

## 2023-11-30 DIAGNOSIS — R79.0 ABNORMAL LEVEL OF BLOOD MINERAL: ICD-10-CM

## 2023-12-05 ENCOUNTER — APPOINTMENT (OUTPATIENT)
Dept: HEMATOLOGY ONCOLOGY | Facility: CLINIC | Age: 83
End: 2023-12-05

## 2023-12-07 ENCOUNTER — APPOINTMENT (OUTPATIENT)
Dept: INTERNAL MEDICINE | Facility: CLINIC | Age: 83
End: 2023-12-07

## 2023-12-07 ENCOUNTER — NON-APPOINTMENT (OUTPATIENT)
Age: 83
End: 2023-12-07

## 2023-12-15 ENCOUNTER — OUTPATIENT (OUTPATIENT)
Dept: OUTPATIENT SERVICES | Facility: HOSPITAL | Age: 83
LOS: 1 days | End: 2023-12-15
Payer: MEDICARE

## 2023-12-15 ENCOUNTER — APPOINTMENT (OUTPATIENT)
Dept: UROLOGY | Facility: CLINIC | Age: 83
End: 2023-12-15
Payer: MEDICARE

## 2023-12-15 DIAGNOSIS — C61 MALIGNANT NEOPLASM OF PROSTATE: ICD-10-CM

## 2023-12-15 DIAGNOSIS — C79.51 SECONDARY MALIGNANT NEOPLASM OF BONE: ICD-10-CM

## 2023-12-15 DIAGNOSIS — R35.0 FREQUENCY OF MICTURITION: ICD-10-CM

## 2023-12-15 PROCEDURE — 99214 OFFICE O/P EST MOD 30 MIN: CPT

## 2023-12-15 PROCEDURE — 96402 CHEMO HORMON ANTINEOPL SQ/IM: CPT

## 2023-12-15 RX ORDER — BICALUTAMIDE 50 MG/1
50 TABLET ORAL DAILY
Qty: 30 | Refills: 5 | Status: ACTIVE | COMMUNITY
Start: 2023-12-15 | End: 1900-01-01

## 2023-12-15 RX ORDER — ABIRATERONE ACETATE 250 MG/1
250 TABLET, FILM COATED ORAL DAILY
Qty: 120 | Refills: 2 | Status: ACTIVE | COMMUNITY
Start: 2023-12-15 | End: 1900-01-01

## 2023-12-15 RX ORDER — LEUPROLIDE ACETATE 22.5 MG/.375ML
22.5 INJECTION, SUSPENSION, EXTENDED RELEASE SUBCUTANEOUS
Refills: 0 | Status: COMPLETED | OUTPATIENT
Start: 2023-12-15

## 2023-12-15 RX ADMIN — LEUPROLIDE ACETATE 0 MG: KIT SUBCUTANEOUS at 00:00

## 2023-12-15 NOTE — DISEASE MANAGEMENT
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

## 2023-12-15 NOTE — HISTORY OF PRESENT ILLNESS
[FreeTextEntry1] : referred for evaluation of an elevated PSA presently 81; had been 35 2/23 and 11.8 2021 never had a PNB before. has lost 10+ lbs though his appetite is down and complains of LBP for the past month. he is on finasterdie and tamsulosin for LUTs.  he notes frequency every 2 hiurs, some urgency and can have leakage with nocturia 3-4 times. he FOS slower with some hesitancy and intermittency. No h/o retention, hematuria or UTIs.  PVR 8cc.   12/23 - MRI noted large PIRADS 5 lesion with pelvic lymphadenopathy and + bones mets. still with [facility]  here with daughter ti start ADT

## 2023-12-15 NOTE — REASON FOR VISIT
[Consideration for Non-Curative Therapy] : consideration for non-curative therapy for prostate cancer [Family Member] : family member

## 2023-12-19 ENCOUNTER — NON-APPOINTMENT (OUTPATIENT)
Age: 83
End: 2023-12-19

## 2023-12-21 ENCOUNTER — APPOINTMENT (OUTPATIENT)
Dept: CARDIOLOGY | Facility: CLINIC | Age: 83
End: 2023-12-21
Payer: MEDICARE

## 2023-12-21 VITALS
BODY MASS INDEX: 16.67 KG/M2 | HEIGHT: 68 IN | SYSTOLIC BLOOD PRESSURE: 90 MMHG | OXYGEN SATURATION: 97 % | TEMPERATURE: 98.3 F | WEIGHT: 110 LBS | HEART RATE: 97 BPM | DIASTOLIC BLOOD PRESSURE: 62 MMHG

## 2023-12-21 DIAGNOSIS — R73.03 PREDIABETES.: ICD-10-CM

## 2023-12-21 DIAGNOSIS — R79.89 OTHER SPECIFIED ABNORMAL FINDINGS OF BLOOD CHEMISTRY: ICD-10-CM

## 2023-12-21 DIAGNOSIS — R10.9 UNSPECIFIED ABDOMINAL PAIN: ICD-10-CM

## 2023-12-21 DIAGNOSIS — Z01.810 ENCOUNTER FOR PREPROCEDURAL CARDIOVASCULAR EXAMINATION: ICD-10-CM

## 2023-12-21 PROCEDURE — 99214 OFFICE O/P EST MOD 30 MIN: CPT | Mod: 25

## 2023-12-21 PROCEDURE — 93000 ELECTROCARDIOGRAM COMPLETE: CPT

## 2023-12-21 NOTE — HISTORY OF PRESENT ILLNESS
[Preoperative Visit] : for a medical evaluation prior to surgery [Scheduled Procedure ___] : a [unfilled] [Surgeon Name ___] : surgeon: [unfilled] [Abdominal Pain] : abdominal pain [Cardiovascular Disease] : cardiovascular disease [GI Disease] : gastrointestinal disease [Date of Surgery ___] : on [unfilled] [Fever] : no fever [Chills] : no chills [Fatigue] : no fatigue [Chest Pain] : no chest pain [Cough] : no cough [Dyspnea] : no dyspnea [Dysuria] : no dysuria [Urinary Frequency] : no urinary frequency [Nausea] : no nausea [Vomiting] : no vomiting [Diarrhea] : no diarrhea [Easy Bruising] : no easy bruising [Lower Extremity Swelling] : no lower extremity swelling [Poor Exercise Tolerance] : no poor exercise tolerance [Diabetes] : no diabetes [Pulmonary Disease] : no pulmonary disease [Anti-Platelet Agents] : no anti-platelet agents [Nicotine Dependence] : no nicotine dependence [Alcohol Use] : no  alcohol use [Renal Disease] : no renal disease [Sleep Apnea] : no sleep apnea [Thromboembolic Problems] : no thromboembolic problems [Clotting Disorder] : no clotting disorder [Frequent use of NSAIDs] : no use of NSAIDs [Bleeding Disorder] : no bleeding disorder [Transfusion Reaction] : no transfusion reaction [Impaired Immunity] : no impaired immunity [Steroid Use in Last 6 Months] : no steroid use in the last six months [Frequent Aspirin Use] : no frequent aspirin use [Prior Anesthesia] : No prior anesthesia [Prev Anesthesia Reaction] : no previous anesthesia reaction [FreeTextEntry1] : WHIT is a 83 year M w/MHx of Prostate Ca w/METs to bone, CAD s/p PCI, HLD, HTN, PreDM who presents for preprocedural examination as mentioned above. At last visit extended Holter ordered for syncope. Too US Carotids. Was referred to EPS. Did have stomach pain and was referred to GI and encouraged to f/u w/ Urology. Notes stool incontinence. W/indwelling catheter, was Rx Cefdinir x 7 days. D/t wt loss, was Rx CT chest and CT abdomen.

## 2023-12-21 NOTE — PHYSICAL EXAM
[General Appearance - Well Developed] : well developed [Well Groomed] : well groomed [General Appearance - Well Nourished] : well nourished [No Deformities] : no deformities [General Appearance - In No Acute Distress] : no acute distress [FreeTextEntry1] : Cachexic [Normal Conjunctiva] : the conjunctiva exhibited no abnormalities [Eyelids - No Xanthelasma] : the eyelids demonstrated no xanthelasmas [Normal Oral Mucosa] : normal oral mucosa [No Oral Pallor] : no oral pallor [No Oral Cyanosis] : no oral cyanosis [Normal Jugular Venous A Waves Present] : normal jugular venous A waves present [Normal Jugular Venous V Waves Present] : normal jugular venous V waves present [No Jugular Venous Gerardo A Waves] : no jugular venous gerardo A waves [Respiration, Rhythm And Depth] : normal respiratory rhythm and effort [Exaggerated Use Of Accessory Muscles For Inspiration] : no accessory muscle use [Auscultation Breath Sounds / Voice Sounds] : lungs were clear to auscultation bilaterally [Heart Rate And Rhythm] : heart rate and rhythm were normal [Heart Sounds] : normal S1 and S2 [Murmurs] : no murmurs present [Abnormal Walk] : normal gait [Gait - Sufficient For Exercise Testing] : the gait was sufficient for exercise testing [Nail Clubbing] : no clubbing of the fingernails [Cyanosis, Localized] : no localized cyanosis [Petechial Hemorrhages (___cm)] : no petechial hemorrhages [Skin Color & Pigmentation] : normal skin color and pigmentation [] : no rash [No Venous Stasis] : no venous stasis [Skin Lesions] : no skin lesions [No Skin Ulcers] : no skin ulcer [No Xanthoma] : no  xanthoma was observed [Oriented To Time, Place, And Person] : oriented to person, place, and time [Affect] : the affect was normal [Mood] : the mood was normal [No Anxiety] : not feeling anxious

## 2023-12-22 ENCOUNTER — APPOINTMENT (OUTPATIENT)
Dept: CT IMAGING | Facility: CLINIC | Age: 83
End: 2023-12-22
Payer: MEDICARE

## 2023-12-22 ENCOUNTER — NON-APPOINTMENT (OUTPATIENT)
Age: 83
End: 2023-12-22

## 2023-12-22 ENCOUNTER — OUTPATIENT (OUTPATIENT)
Dept: OUTPATIENT SERVICES | Facility: HOSPITAL | Age: 83
LOS: 1 days | End: 2023-12-22
Payer: MEDICARE

## 2023-12-22 DIAGNOSIS — R63.4 ABNORMAL WEIGHT LOSS: ICD-10-CM

## 2023-12-22 PROCEDURE — 74177 CT ABD & PELVIS W/CONTRAST: CPT | Mod: 26

## 2023-12-22 PROCEDURE — 74177 CT ABD & PELVIS W/CONTRAST: CPT

## 2023-12-22 PROCEDURE — 71250 CT THORAX DX C-: CPT

## 2023-12-22 PROCEDURE — 71250 CT THORAX DX C-: CPT | Mod: 26

## 2023-12-24 ENCOUNTER — INPATIENT (INPATIENT)
Facility: HOSPITAL | Age: 83
LOS: 3 days | Discharge: HOME HEALTH | DRG: 393 | End: 2023-12-28
Attending: STUDENT IN AN ORGANIZED HEALTH CARE EDUCATION/TRAINING PROGRAM | Admitting: STUDENT IN AN ORGANIZED HEALTH CARE EDUCATION/TRAINING PROGRAM
Payer: MEDICARE

## 2023-12-24 VITALS
HEART RATE: 73 BPM | WEIGHT: 149.91 LBS | RESPIRATION RATE: 22 BRPM | OXYGEN SATURATION: 100 % | DIASTOLIC BLOOD PRESSURE: 55 MMHG | SYSTOLIC BLOOD PRESSURE: 87 MMHG | HEIGHT: 67 IN | TEMPERATURE: 98 F

## 2023-12-24 DIAGNOSIS — K52.9 NONINFECTIVE GASTROENTERITIS AND COLITIS, UNSPECIFIED: ICD-10-CM

## 2023-12-24 LAB
ALBUMIN SERPL ELPH-MCNC: 3.7 G/DL — SIGNIFICANT CHANGE UP (ref 3.3–5)
ALBUMIN SERPL ELPH-MCNC: 3.7 G/DL — SIGNIFICANT CHANGE UP (ref 3.3–5)
ALP SERPL-CCNC: 187 U/L — HIGH (ref 40–120)
ALP SERPL-CCNC: 187 U/L — HIGH (ref 40–120)
ALT FLD-CCNC: 16 U/L — SIGNIFICANT CHANGE UP (ref 10–45)
ALT FLD-CCNC: 16 U/L — SIGNIFICANT CHANGE UP (ref 10–45)
ANION GAP SERPL CALC-SCNC: 8 MMOL/L — SIGNIFICANT CHANGE UP (ref 5–17)
ANION GAP SERPL CALC-SCNC: 8 MMOL/L — SIGNIFICANT CHANGE UP (ref 5–17)
APPEARANCE UR: ABNORMAL
APPEARANCE UR: ABNORMAL
APTT BLD: 28.4 SEC — SIGNIFICANT CHANGE UP (ref 24.5–35.6)
APTT BLD: 28.4 SEC — SIGNIFICANT CHANGE UP (ref 24.5–35.6)
AST SERPL-CCNC: 29 U/L — SIGNIFICANT CHANGE UP (ref 10–40)
AST SERPL-CCNC: 29 U/L — SIGNIFICANT CHANGE UP (ref 10–40)
BACTERIA # UR AUTO: ABNORMAL /HPF
BACTERIA # UR AUTO: ABNORMAL /HPF
BASE EXCESS BLDV CALC-SCNC: 0.6 MMOL/L — SIGNIFICANT CHANGE UP (ref -2–3)
BASE EXCESS BLDV CALC-SCNC: 0.6 MMOL/L — SIGNIFICANT CHANGE UP (ref -2–3)
BASOPHILS # BLD AUTO: 0.04 K/UL — SIGNIFICANT CHANGE UP (ref 0–0.2)
BASOPHILS # BLD AUTO: 0.04 K/UL — SIGNIFICANT CHANGE UP (ref 0–0.2)
BASOPHILS NFR BLD AUTO: 1 % — SIGNIFICANT CHANGE UP (ref 0–2)
BASOPHILS NFR BLD AUTO: 1 % — SIGNIFICANT CHANGE UP (ref 0–2)
BILIRUB SERPL-MCNC: 0.4 MG/DL — SIGNIFICANT CHANGE UP (ref 0.2–1.2)
BILIRUB SERPL-MCNC: 0.4 MG/DL — SIGNIFICANT CHANGE UP (ref 0.2–1.2)
BILIRUB UR-MCNC: NEGATIVE — SIGNIFICANT CHANGE UP
BILIRUB UR-MCNC: NEGATIVE — SIGNIFICANT CHANGE UP
BUN SERPL-MCNC: 9 MG/DL — SIGNIFICANT CHANGE UP (ref 7–23)
BUN SERPL-MCNC: 9 MG/DL — SIGNIFICANT CHANGE UP (ref 7–23)
CA-I SERPL-SCNC: 1.15 MMOL/L — SIGNIFICANT CHANGE UP (ref 1.15–1.33)
CA-I SERPL-SCNC: 1.15 MMOL/L — SIGNIFICANT CHANGE UP (ref 1.15–1.33)
CALCIUM SERPL-MCNC: 9.6 MG/DL — SIGNIFICANT CHANGE UP (ref 8.4–10.5)
CALCIUM SERPL-MCNC: 9.6 MG/DL — SIGNIFICANT CHANGE UP (ref 8.4–10.5)
CAST: 1 /LPF — SIGNIFICANT CHANGE UP (ref 0–4)
CAST: 1 /LPF — SIGNIFICANT CHANGE UP (ref 0–4)
CHLORIDE BLDV-SCNC: 107 MMOL/L — SIGNIFICANT CHANGE UP (ref 96–108)
CHLORIDE BLDV-SCNC: 107 MMOL/L — SIGNIFICANT CHANGE UP (ref 96–108)
CHLORIDE SERPL-SCNC: 105 MMOL/L — SIGNIFICANT CHANGE UP (ref 96–108)
CHLORIDE SERPL-SCNC: 105 MMOL/L — SIGNIFICANT CHANGE UP (ref 96–108)
CO2 BLDV-SCNC: 27 MMOL/L — HIGH (ref 22–26)
CO2 BLDV-SCNC: 27 MMOL/L — HIGH (ref 22–26)
CO2 SERPL-SCNC: 25 MMOL/L — SIGNIFICANT CHANGE UP (ref 22–31)
CO2 SERPL-SCNC: 25 MMOL/L — SIGNIFICANT CHANGE UP (ref 22–31)
COLOR SPEC: YELLOW — SIGNIFICANT CHANGE UP
COLOR SPEC: YELLOW — SIGNIFICANT CHANGE UP
CREAT SERPL-MCNC: 1 MG/DL — SIGNIFICANT CHANGE UP (ref 0.5–1.3)
CREAT SERPL-MCNC: 1 MG/DL — SIGNIFICANT CHANGE UP (ref 0.5–1.3)
DIFF PNL FLD: ABNORMAL
DIFF PNL FLD: ABNORMAL
EGFR: 74 ML/MIN/1.73M2 — SIGNIFICANT CHANGE UP
EGFR: 74 ML/MIN/1.73M2 — SIGNIFICANT CHANGE UP
EOSINOPHIL # BLD AUTO: 0.12 K/UL — SIGNIFICANT CHANGE UP (ref 0–0.5)
EOSINOPHIL # BLD AUTO: 0.12 K/UL — SIGNIFICANT CHANGE UP (ref 0–0.5)
EOSINOPHIL NFR BLD AUTO: 3.1 % — SIGNIFICANT CHANGE UP (ref 0–6)
EOSINOPHIL NFR BLD AUTO: 3.1 % — SIGNIFICANT CHANGE UP (ref 0–6)
GAS PNL BLDV: 137 MMOL/L — SIGNIFICANT CHANGE UP (ref 136–145)
GAS PNL BLDV: 137 MMOL/L — SIGNIFICANT CHANGE UP (ref 136–145)
GAS PNL BLDV: SIGNIFICANT CHANGE UP
GAS PNL BLDV: SIGNIFICANT CHANGE UP
GLUCOSE BLDV-MCNC: 91 MG/DL — SIGNIFICANT CHANGE UP (ref 70–99)
GLUCOSE BLDV-MCNC: 91 MG/DL — SIGNIFICANT CHANGE UP (ref 70–99)
GLUCOSE SERPL-MCNC: 116 MG/DL — HIGH (ref 70–99)
GLUCOSE SERPL-MCNC: 116 MG/DL — HIGH (ref 70–99)
GLUCOSE UR QL: NEGATIVE MG/DL — SIGNIFICANT CHANGE UP
GLUCOSE UR QL: NEGATIVE MG/DL — SIGNIFICANT CHANGE UP
HCO3 BLDV-SCNC: 26 MMOL/L — SIGNIFICANT CHANGE UP (ref 22–29)
HCO3 BLDV-SCNC: 26 MMOL/L — SIGNIFICANT CHANGE UP (ref 22–29)
HCT VFR BLD CALC: 36.4 % — LOW (ref 39–50)
HCT VFR BLD CALC: 36.4 % — LOW (ref 39–50)
HCT VFR BLDA CALC: 31 % — LOW (ref 39–51)
HCT VFR BLDA CALC: 31 % — LOW (ref 39–51)
HGB BLD CALC-MCNC: 10.3 G/DL — LOW (ref 12.6–17.4)
HGB BLD CALC-MCNC: 10.3 G/DL — LOW (ref 12.6–17.4)
HGB BLD-MCNC: 11.6 G/DL — LOW (ref 13–17)
HGB BLD-MCNC: 11.6 G/DL — LOW (ref 13–17)
IMM GRANULOCYTES NFR BLD AUTO: 0.3 % — SIGNIFICANT CHANGE UP (ref 0–0.9)
IMM GRANULOCYTES NFR BLD AUTO: 0.3 % — SIGNIFICANT CHANGE UP (ref 0–0.9)
INR BLD: 1.04 RATIO — SIGNIFICANT CHANGE UP (ref 0.85–1.18)
INR BLD: 1.04 RATIO — SIGNIFICANT CHANGE UP (ref 0.85–1.18)
KETONES UR-MCNC: NEGATIVE MG/DL — SIGNIFICANT CHANGE UP
KETONES UR-MCNC: NEGATIVE MG/DL — SIGNIFICANT CHANGE UP
LACTATE BLDV-MCNC: 0.9 MMOL/L — SIGNIFICANT CHANGE UP (ref 0.5–2)
LACTATE BLDV-MCNC: 0.9 MMOL/L — SIGNIFICANT CHANGE UP (ref 0.5–2)
LEUKOCYTE ESTERASE UR-ACNC: ABNORMAL
LEUKOCYTE ESTERASE UR-ACNC: ABNORMAL
LYMPHOCYTES # BLD AUTO: 0.88 K/UL — LOW (ref 1–3.3)
LYMPHOCYTES # BLD AUTO: 0.88 K/UL — LOW (ref 1–3.3)
LYMPHOCYTES # BLD AUTO: 23 % — SIGNIFICANT CHANGE UP (ref 13–44)
LYMPHOCYTES # BLD AUTO: 23 % — SIGNIFICANT CHANGE UP (ref 13–44)
MCHC RBC-ENTMCNC: 30.1 PG — SIGNIFICANT CHANGE UP (ref 27–34)
MCHC RBC-ENTMCNC: 30.1 PG — SIGNIFICANT CHANGE UP (ref 27–34)
MCHC RBC-ENTMCNC: 31.9 GM/DL — LOW (ref 32–36)
MCHC RBC-ENTMCNC: 31.9 GM/DL — LOW (ref 32–36)
MCV RBC AUTO: 94.3 FL — SIGNIFICANT CHANGE UP (ref 80–100)
MCV RBC AUTO: 94.3 FL — SIGNIFICANT CHANGE UP (ref 80–100)
MONOCYTES # BLD AUTO: 0.43 K/UL — SIGNIFICANT CHANGE UP (ref 0–0.9)
MONOCYTES # BLD AUTO: 0.43 K/UL — SIGNIFICANT CHANGE UP (ref 0–0.9)
MONOCYTES NFR BLD AUTO: 11.2 % — SIGNIFICANT CHANGE UP (ref 2–14)
MONOCYTES NFR BLD AUTO: 11.2 % — SIGNIFICANT CHANGE UP (ref 2–14)
NEUTROPHILS # BLD AUTO: 2.35 K/UL — SIGNIFICANT CHANGE UP (ref 1.8–7.4)
NEUTROPHILS # BLD AUTO: 2.35 K/UL — SIGNIFICANT CHANGE UP (ref 1.8–7.4)
NEUTROPHILS NFR BLD AUTO: 61.4 % — SIGNIFICANT CHANGE UP (ref 43–77)
NEUTROPHILS NFR BLD AUTO: 61.4 % — SIGNIFICANT CHANGE UP (ref 43–77)
NITRITE UR-MCNC: POSITIVE
NITRITE UR-MCNC: POSITIVE
NRBC # BLD: 0 /100 WBCS — SIGNIFICANT CHANGE UP (ref 0–0)
NRBC # BLD: 0 /100 WBCS — SIGNIFICANT CHANGE UP (ref 0–0)
PCO2 BLDV: 44 MMHG — SIGNIFICANT CHANGE UP (ref 42–55)
PCO2 BLDV: 44 MMHG — SIGNIFICANT CHANGE UP (ref 42–55)
PH BLDV: 7.38 — SIGNIFICANT CHANGE UP (ref 7.32–7.43)
PH BLDV: 7.38 — SIGNIFICANT CHANGE UP (ref 7.32–7.43)
PH UR: 6 — SIGNIFICANT CHANGE UP (ref 5–8)
PH UR: 6 — SIGNIFICANT CHANGE UP (ref 5–8)
PLATELET # BLD AUTO: 257 K/UL — SIGNIFICANT CHANGE UP (ref 150–400)
PLATELET # BLD AUTO: 257 K/UL — SIGNIFICANT CHANGE UP (ref 150–400)
PO2 BLDV: 47 MMHG — HIGH (ref 25–45)
PO2 BLDV: 47 MMHG — HIGH (ref 25–45)
POTASSIUM BLDV-SCNC: 5.5 MMOL/L — HIGH (ref 3.5–5.1)
POTASSIUM BLDV-SCNC: 5.5 MMOL/L — HIGH (ref 3.5–5.1)
POTASSIUM SERPL-MCNC: 3.4 MMOL/L — LOW (ref 3.5–5.3)
POTASSIUM SERPL-MCNC: 3.4 MMOL/L — LOW (ref 3.5–5.3)
POTASSIUM SERPL-SCNC: 3.4 MMOL/L — LOW (ref 3.5–5.3)
POTASSIUM SERPL-SCNC: 3.4 MMOL/L — LOW (ref 3.5–5.3)
PROT SERPL-MCNC: 7.1 G/DL — SIGNIFICANT CHANGE UP (ref 6–8.3)
PROT SERPL-MCNC: 7.1 G/DL — SIGNIFICANT CHANGE UP (ref 6–8.3)
PROT UR-MCNC: SIGNIFICANT CHANGE UP MG/DL
PROT UR-MCNC: SIGNIFICANT CHANGE UP MG/DL
PROTHROM AB SERPL-ACNC: 10.9 SEC — SIGNIFICANT CHANGE UP (ref 9.5–13)
PROTHROM AB SERPL-ACNC: 10.9 SEC — SIGNIFICANT CHANGE UP (ref 9.5–13)
RBC # BLD: 3.86 M/UL — LOW (ref 4.2–5.8)
RBC # BLD: 3.86 M/UL — LOW (ref 4.2–5.8)
RBC # FLD: 16.6 % — HIGH (ref 10.3–14.5)
RBC # FLD: 16.6 % — HIGH (ref 10.3–14.5)
RBC CASTS # UR COMP ASSIST: 1 /HPF — SIGNIFICANT CHANGE UP (ref 0–4)
RBC CASTS # UR COMP ASSIST: 1 /HPF — SIGNIFICANT CHANGE UP (ref 0–4)
SAO2 % BLDV: 80.7 % — SIGNIFICANT CHANGE UP (ref 67–88)
SAO2 % BLDV: 80.7 % — SIGNIFICANT CHANGE UP (ref 67–88)
SODIUM SERPL-SCNC: 138 MMOL/L — SIGNIFICANT CHANGE UP (ref 135–145)
SODIUM SERPL-SCNC: 138 MMOL/L — SIGNIFICANT CHANGE UP (ref 135–145)
SP GR SPEC: 1.02 — SIGNIFICANT CHANGE UP (ref 1–1.03)
SP GR SPEC: 1.02 — SIGNIFICANT CHANGE UP (ref 1–1.03)
SQUAMOUS # UR AUTO: 0 /HPF — SIGNIFICANT CHANGE UP (ref 0–5)
SQUAMOUS # UR AUTO: 0 /HPF — SIGNIFICANT CHANGE UP (ref 0–5)
UROBILINOGEN FLD QL: 1 MG/DL — SIGNIFICANT CHANGE UP (ref 0.2–1)
UROBILINOGEN FLD QL: 1 MG/DL — SIGNIFICANT CHANGE UP (ref 0.2–1)
WBC # BLD: 3.83 K/UL — SIGNIFICANT CHANGE UP (ref 3.8–10.5)
WBC # BLD: 3.83 K/UL — SIGNIFICANT CHANGE UP (ref 3.8–10.5)
WBC # FLD AUTO: 3.83 K/UL — SIGNIFICANT CHANGE UP (ref 3.8–10.5)
WBC # FLD AUTO: 3.83 K/UL — SIGNIFICANT CHANGE UP (ref 3.8–10.5)
WBC UR QL: 98 /HPF — HIGH (ref 0–5)
WBC UR QL: 98 /HPF — HIGH (ref 0–5)

## 2023-12-24 PROCEDURE — 99285 EMERGENCY DEPT VISIT HI MDM: CPT

## 2023-12-24 RX ORDER — PIPERACILLIN AND TAZOBACTAM 4; .5 G/20ML; G/20ML
3.38 INJECTION, POWDER, LYOPHILIZED, FOR SOLUTION INTRAVENOUS ONCE
Refills: 0 | Status: COMPLETED | OUTPATIENT
Start: 2023-12-24 | End: 2023-12-24

## 2023-12-24 RX ORDER — ACETAMINOPHEN 500 MG
1000 TABLET ORAL ONCE
Refills: 0 | Status: COMPLETED | OUTPATIENT
Start: 2023-12-24 | End: 2023-12-24

## 2023-12-24 RX ADMIN — Medication 1000 MILLIGRAM(S): at 16:17

## 2023-12-24 RX ADMIN — Medication 400 MILLIGRAM(S): at 15:51

## 2023-12-24 RX ADMIN — PIPERACILLIN AND TAZOBACTAM 200 GRAM(S): 4; .5 INJECTION, POWDER, LYOPHILIZED, FOR SOLUTION INTRAVENOUS at 16:19

## 2023-12-24 NOTE — ED PROVIDER NOTE - ATTENDING CONTRIBUTION TO CARE
82 y/o male, A&O x3, PMH HLD, CAD, and stage 4 prostate CA, presents to the ED c/o abdominal pain, constipation, and bloody stool. Patient presenting by Dr. Barjaas for admission discussed on the phone.  Patient was seen at [facility] yesterday and discharged with a UTI I had imaging which was otherwise normal as per son.  But no images or reports.  Patient 2 days ago CT showing proctitis possible perforation of the rectum IV antibiotics were ordered colorectal surgery consult he has been having bloody stools labs were sent hemodynamically stable abdomen soft mild lower abd pain. 82 y/o male, A&O x3, PMH HLD, CAD, and stage 4 prostate CA, presents to the ED c/o abdominal pain, constipation, and bloody stool. Patient presenting by Dr. Barajas for admission discussed on the phone.  Patient was seen at [facility] yesterday and discharged with a UTI I had imaging which was otherwise normal as per son.  But no images or reports.  Patient 2 days ago CT showing proctitis possible perforation of the rectum IV antibiotics were ordered colorectal surgery consult he has been having bloody stools labs were sent hemodynamically stable abdomen soft mild lower abd pain.

## 2023-12-24 NOTE — ED PROVIDER NOTE - PROGRESS NOTE DETAILS
Anabela Yousif PGY2: Surg consulted and will come see pt. Spoke wit pt's PCP Dr. Barajas who is requesting admission as pt has been having decreased PO intake recently in addition to CT findings. Dieter Nolan MD PGY-2: Called son to update on plan. No answer

## 2023-12-24 NOTE — ED ADULT NURSE NOTE - OBJECTIVE STATEMENT
82 y/o male, A&O x3, PMH HLD, CAD, and stage 4 prostate CA, presents to the ED c/o abdominal pain, constipation, and bloody stool. Pt endorses 1 week of abdominal pain that is accompanied by constipation and "bright red spotting" when he wipes. Pt states having UTI last week and was seen at [facility] was prescribed antibiotics and was D/C home. Son at bedside endorses pt takes oral chemo pills everyday at home. Pt affect is calm and appropriate, spontaneous unlabored breathing, strong peripheral pulses, abdomen soft nondistended tender to palpation, browne catheter placed yesterday outpt clean dry intact drained 100 CC in leg bag, skin clean dry and intact. Pt denies chest pain, SOB/difficulty breathing, fever/chills, HA, abd pain, N/V/D, lightheadedness/dizziness, numbness/tingling. Safety and comfort measures maintained. 82 y/o male, A&O x3, PMH HLD, CAD, and stage 4 prostate CA, presents to the ED c/o abdominal pain, constipation, and bloody stool. Pt endorses 1 week of abdominal pain that is accompanied by constipation and "bright red spotting" when he wipes. Pt states having UTI last week and was seen at [facility] was prescribed antibiotics and was D/C home. Son at bedside endorses pt takes oral chemo pills everyday at home. Pt affect is calm and appropriate, spontaneous unlabored breathing, strong peripheral pulses, abdomen soft nondistended tender to palpation, browne catheter placed yesterday outpt clean dry intact drained 100 CC in leg bag, skin clean dry and intact. Pt denies chest pain, SOB/difficulty breathing, fever/chills, HA, abd pain, N/V/D, lightheadedness/dizziness, numbness/tingling. Safety and comfort measures maintained.

## 2023-12-24 NOTE — ED PROVIDER NOTE - OBJECTIVE STATEMENT
83-year-old male past medical history of HLD, CAD, prostate CA stage IV, BPH w/ chronic Booth presents to the ED for abnormal CT results.  Patient had outpatient CT done 2 days ago and was noted to have severe proctitis with signs of rectal perforation.  Patient has been having 1 to 2 weeks of rectal pain, worse with bowel movements.  Patient went to OSH yesterday and had a repeat CT and was discharged, unsure of the results.  Patient was seen 1 week ago and found to have UTI and has been on oral antibiotics since then.  Patient denies fevers, chills, headache, vision changes, chest pain, trouble breathing, nausea/vomiting, diarrhea/constipation.

## 2023-12-24 NOTE — ED CLERICAL - NS ED CLERK NOTE PRE-ARRIVAL INFORMATION; ADDITIONAL PRE-ARRIVAL INFORMATION
CC/Reason For referral: Abdominal Pain. CT scan showed rectal perforation and inflammation to kidneys.  Preferred Consultant(if applicable):  Who admits for you (if needed):  Do you have documents you would like to fax over?  Would you still like to speak to an ED attending? Yes

## 2023-12-24 NOTE — ED ADULT NURSE NOTE - NS ED NURSE REPORT GIVEN DT
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 Patient has missed 3 HD sessions in the past week due to a gout flare.  - Urgent HD  - Nephrology following 25-Dec-2023 01:51

## 2023-12-24 NOTE — CONSULT NOTE ADULT - SUBJECTIVE AND OBJECTIVE BOX
DATE OF SERVICE: 12-24-23 @ 16:21    CHIEF COMPLAINT:Patient is a 83y old  Male who presents with a chief complaint of abd pain     HISTORY OF PRESENT ILLNESS:HPI:  82 y/o male, A&O x3, PMH HLD, CAD, and stage 4 prostate CA, presents to the ED c/o abdominal pain, constipation, and bloody stool. Pt endorses 1 week of abdominal pain that is accompanied by constipation and "bright red spotting" when he wipes. Pt states having UTI last week and was seen at [facility] was prescribed antibiotics and was D/C home. CT A/P 3 days ao with possible rectal perforation. Sent by Dr Barajas for evaluation     PAST MEDICAL & SURGICAL HISTORY:  CAD (Coronary Artery Disease)      Coronary Stent  in 2005      Hypercholesterolemia      BPH (Benign Prostatic Hypertrophy)      Depression      S/P Manipulation of Deviated Nasal Septum      History of Spinal Surgery  l/s spine              MEDICATIONS:                  FAMILY HISTORY:      Non-contributory    SOCIAL HISTORY:    [ ] Tobacco  [ ] Drugs  [ ] Alcohol    Allergies    No Known Allergies    Intolerances    	    REVIEW OF SYSTEMS:  CONSTITUTIONAL: No fever  EYES: No eye pain, visual disturbances, or discharge  ENMT:  No difficulty hearing, tinnitus  NECK: No pain or stiffness  RESPIRATORY: No cough, wheezing,  CARDIOVASCULAR: No chest pain, palpitations, passing out, dizziness, or leg swelling  GASTROINTESTINAL:  No nausea, vomiting, diarrhea or constipation. No melena.  GENITOURINARY: No dysuria, hematuria  NEUROLOGICAL: No stroke like symptoms  SKIN: No burning or lesions   ENDOCRINE: No heat or cold intolerance  MUSCULOSKELETAL: No joint pain or swelling  PSYCHIATRIC: No  anxiety, mood swings  HEME/LYMPH: No bleeding gums  ALLERGY AND IMMUNOLOGIC: No hives or eczema	    All other ROS negative    PHYSICAL EXAM:  T(C): 36.7 (12-24-23 @ 15:00), Max: 36.7 (12-24-23 @ 15:00)  HR: 61 (12-24-23 @ 15:00) (61 - 73)  BP: 105/64 (12-24-23 @ 15:00) (87/55 - 105/64)  RR: 18 (12-24-23 @ 15:00) (18 - 22)  SpO2: 100% (12-24-23 @ 15:00) (100% - 100%)  Wt(kg): --  I&O's Summary      Appearance: Normal	  HEENT:   Normal oral mucosa, EOMI	  Cardiovascular:  S1 S2, No JVD,    Respiratory: Lungs clear to auscultation	  Psychiatry: Alert  Gastrointestinal:  Soft, Non-tender, + BS	  Skin: No rashes   Neurologic: Non-focal  Extremities:  No edema  Vascular: Peripheral pulses palpable    	    	  	  CARDIAC MARKERS:  Labs personally reviewed by me                                  11.6   3.83  )-----------( 257      ( 24 Dec 2023 16:05 )             36.4                 Assessment /Plan:   82 y/o male, A&O x3, PMH HLD, CAD, and stage 4 prostate CA, presents to the ED c/o abdominal pain, constipation, and bloody stool.    1. Abdominal Pain   Associated with constipation and bloody stools (bright red)   CT A/P 12/22 concerning for rectal perforation   Trend Hgb   Sent by Dr Barajas for colorectal surg eval     2. CAD   s/p PCI RCA  2011   Cont asa if safe per surgery     3. HLD  C/w simvastatin 40mg QD    Differential diagnosis and plan of care discussed with patient after the evaluation. Counseling on diet, nutritional counseling, weight management, exercise and medication compliance was done.   Advanced care planning/advanced directives discussed with patient/family. DNR status including forceful chest compressions to attempt to restart the heart, ventilator support/artificial breathing, electric shock, artificial nutrition, health care proxy, Molst form all discussed with pt. Pt wishes to consider. More than fifteen minutes spent on discussing advanced directives.     SCOOTER Azevedo DO [facility]  Cardiovascular Medicine  [address] [place] [address], [address]  Office [phone]  Available via call or text on Microsoft Teams  DATE OF SERVICE: 12-24-23 @ 16:21    CHIEF COMPLAINT:Patient is a 83y old  Male who presents with a chief complaint of abd pain     HISTORY OF PRESENT ILLNESS:HPI:  84 y/o male, A&O x3, PMH HLD, CAD, and stage 4 prostate CA, presents to the ED c/o abdominal pain, constipation, and bloody stool. Pt endorses 1 week of abdominal pain that is accompanied by constipation and "bright red spotting" when he wipes. Pt states having UTI last week and was seen at [facility] was prescribed antibiotics and was D/C home. CT A/P 3 days ao with possible rectal perforation. Sent by Dr Barajas for evaluation     PAST MEDICAL & SURGICAL HISTORY:  CAD (Coronary Artery Disease)      Coronary Stent  in 2005      Hypercholesterolemia      BPH (Benign Prostatic Hypertrophy)      Depression      S/P Manipulation of Deviated Nasal Septum      History of Spinal Surgery  l/s spine              MEDICATIONS:                  FAMILY HISTORY:      Non-contributory    SOCIAL HISTORY:    [ ] Tobacco  [ ] Drugs  [ ] Alcohol    Allergies    No Known Allergies    Intolerances    	    REVIEW OF SYSTEMS:  CONSTITUTIONAL: No fever  EYES: No eye pain, visual disturbances, or discharge  ENMT:  No difficulty hearing, tinnitus  NECK: No pain or stiffness  RESPIRATORY: No cough, wheezing,  CARDIOVASCULAR: No chest pain, palpitations, passing out, dizziness, or leg swelling  GASTROINTESTINAL:  No nausea, vomiting, diarrhea or constipation. No melena.  GENITOURINARY: No dysuria, hematuria  NEUROLOGICAL: No stroke like symptoms  SKIN: No burning or lesions   ENDOCRINE: No heat or cold intolerance  MUSCULOSKELETAL: No joint pain or swelling  PSYCHIATRIC: No  anxiety, mood swings  HEME/LYMPH: No bleeding gums  ALLERGY AND IMMUNOLOGIC: No hives or eczema	    All other ROS negative    PHYSICAL EXAM:  T(C): 36.7 (12-24-23 @ 15:00), Max: 36.7 (12-24-23 @ 15:00)  HR: 61 (12-24-23 @ 15:00) (61 - 73)  BP: 105/64 (12-24-23 @ 15:00) (87/55 - 105/64)  RR: 18 (12-24-23 @ 15:00) (18 - 22)  SpO2: 100% (12-24-23 @ 15:00) (100% - 100%)  Wt(kg): --  I&O's Summary      Appearance: Normal	  HEENT:   Normal oral mucosa, EOMI	  Cardiovascular:  S1 S2, No JVD,    Respiratory: Lungs clear to auscultation	  Psychiatry: Alert  Gastrointestinal:  Soft, Non-tender, + BS	  Skin: No rashes   Neurologic: Non-focal  Extremities:  No edema  Vascular: Peripheral pulses palpable    	    	  	  CARDIAC MARKERS:  Labs personally reviewed by me                                  11.6   3.83  )-----------( 257      ( 24 Dec 2023 16:05 )             36.4                 Assessment /Plan:   84 y/o male, A&O x3, PMH HLD, CAD, and stage 4 prostate CA, presents to the ED c/o abdominal pain, constipation, and bloody stool.    1. Abdominal Pain   Associated with constipation and bloody stools (bright red)   CT A/P 12/22 concerning for rectal perforation   Trend Hgb   Sent by Dr Barajas for colorectal surg eval     2. CAD   s/p PCI RCA  2011   Cont asa if safe per surgery     3. HLD  C/w simvastatin 40mg QD    Differential diagnosis and plan of care discussed with patient after the evaluation. Counseling on diet, nutritional counseling, weight management, exercise and medication compliance was done.   Advanced care planning/advanced directives discussed with patient/family. DNR status including forceful chest compressions to attempt to restart the heart, ventilator support/artificial breathing, electric shock, artificial nutrition, health care proxy, Molst form all discussed with pt. Pt wishes to consider. More than fifteen minutes spent on discussing advanced directives.     SCOOTER Azevedo DO [facility]  Cardiovascular Medicine  [address] [place] [address], [address]  Office [phone]  Available via call or text on Microsoft Teams  DATE OF SERVICE: 12-24-23 @ 16:21    CHIEF COMPLAINT:Patient is a 83y old  Male who presents with a chief complaint of abd pain     HISTORY OF PRESENT ILLNESS:HPI:  84 y/o male, A&O x3, PMH HLD, CAD, and stage 4 prostate CA, presents to the ED c/o abdominal pain, constipation, and bloody stool. Pt endorses 1 week of abdominal pain that is accompanied by constipation and "bright red spotting" when he wipes. Pt states having UTI last week and was seen at [facility] was prescribed antibiotics and was D/C home. CT A/P 3 days ao with possible rectal perforation. Sent by Dr Barajas for evaluation     PAST MEDICAL & SURGICAL HISTORY:  CAD (Coronary Artery Disease)      Coronary Stent  in 2005      Hypercholesterolemia      BPH (Benign Prostatic Hypertrophy)      Depression      S/P Manipulation of Deviated Nasal Septum      History of Spinal Surgery  l/s spine              MEDICATIONS:                  FAMILY HISTORY:      Non-contributory    SOCIAL HISTORY:    [ ] Tobacco  [ ] Drugs  [ ] Alcohol    Allergies    No Known Allergies    Intolerances    	    REVIEW OF SYSTEMS:  CONSTITUTIONAL: No fever  EYES: No eye pain, visual disturbances, or discharge  ENMT:  No difficulty hearing, tinnitus  NECK: No pain or stiffness  RESPIRATORY: No cough, wheezing,  CARDIOVASCULAR: No chest pain, palpitations, passing out, dizziness, or leg swelling  GASTROINTESTINAL:  No nausea, vomiting, diarrhea or constipation. No melena.  GENITOURINARY: No dysuria, hematuria  NEUROLOGICAL: No stroke like symptoms  SKIN: No burning or lesions   ENDOCRINE: No heat or cold intolerance  MUSCULOSKELETAL: No joint pain or swelling  PSYCHIATRIC: No  anxiety, mood swings  HEME/LYMPH: No bleeding gums  ALLERGY AND IMMUNOLOGIC: No hives or eczema	    All other ROS negative    PHYSICAL EXAM:  T(C): 36.7 (12-24-23 @ 15:00), Max: 36.7 (12-24-23 @ 15:00)  HR: 61 (12-24-23 @ 15:00) (61 - 73)  BP: 105/64 (12-24-23 @ 15:00) (87/55 - 105/64)  RR: 18 (12-24-23 @ 15:00) (18 - 22)  SpO2: 100% (12-24-23 @ 15:00) (100% - 100%)  Wt(kg): --  I&O's Summary      Appearance: Normal	  HEENT:   Normal oral mucosa, EOMI	  Cardiovascular:  S1 S2, No JVD,    Respiratory: Lungs clear to auscultation	  Psychiatry: Alert  Gastrointestinal:  Soft, Non-tender, + BS	  Skin: No rashes   Neurologic: Non-focal  Extremities:  No edema  Vascular: Peripheral pulses palpable    	    	  	  CARDIAC MARKERS:  Labs personally reviewed by me                                  11.6   3.83  )-----------( 257      ( 24 Dec 2023 16:05 )             36.4             Assessment /Plan:   84 y/o male, A&O x3, PMH HLD, CAD, and stage 4 prostate CA, presents to the ED c/o abdominal pain, constipation, and bloody stool.    1. Abdominal Pain   Associated with constipation and bloody stools (bright red)   CT A/P 12/22 concerning for rectal perforation   Trend Hgb   Sent by Dr Barajas for colorectal surg eval     2. CAD   s/p PCI RCA  2011   Cont asa if safe per surgery     3. HLD  C/w simvastatin 40mg QD          Differential diagnosis and plan of care discussed with patient after the evaluation. Counseling on diet, nutritional counseling, weight management, exercise and medication compliance was done.   Advanced care planning/advanced directives discussed with patient/family. DNR status including forceful chest compressions to attempt to restart the heart, ventilator support/artificial breathing, electric shock, artificial nutrition, health care proxy, Molst form all discussed with pt. Pt wishes to consider. More than fifteen minutes spent on discussing advanced directives.     SCOOTER Azevedo DO [facility]  Cardiovascular Medicine  [address] [place] [address], [address]  Office [phone]  Available via call or text on Microsoft Teams  DATE OF SERVICE: 12-24-23 @ 16:21    CHIEF COMPLAINT:Patient is a 83y old  Male who presents with a chief complaint of abd pain     HISTORY OF PRESENT ILLNESS:HPI:  82 y/o male, A&O x3, PMH HLD, CAD, and stage 4 prostate CA, presents to the ED c/o abdominal pain, constipation, and bloody stool. Pt endorses 1 week of abdominal pain that is accompanied by constipation and "bright red spotting" when he wipes. Pt states having UTI last week and was seen at [facility] was prescribed antibiotics and was D/C home. CT A/P 3 days ao with possible rectal perforation. Sent by Dr Barajas for evaluation     PAST MEDICAL & SURGICAL HISTORY:  CAD (Coronary Artery Disease)      Coronary Stent  in 2005      Hypercholesterolemia      BPH (Benign Prostatic Hypertrophy)      Depression      S/P Manipulation of Deviated Nasal Septum      History of Spinal Surgery  l/s spine              MEDICATIONS:                  FAMILY HISTORY:      Non-contributory    SOCIAL HISTORY:    [ ] Tobacco  [ ] Drugs  [ ] Alcohol    Allergies    No Known Allergies    Intolerances    	    REVIEW OF SYSTEMS:  CONSTITUTIONAL: No fever  EYES: No eye pain, visual disturbances, or discharge  ENMT:  No difficulty hearing, tinnitus  NECK: No pain or stiffness  RESPIRATORY: No cough, wheezing,  CARDIOVASCULAR: No chest pain, palpitations, passing out, dizziness, or leg swelling  GASTROINTESTINAL:  No nausea, vomiting, diarrhea or constipation. No melena.  GENITOURINARY: No dysuria, hematuria  NEUROLOGICAL: No stroke like symptoms  SKIN: No burning or lesions   ENDOCRINE: No heat or cold intolerance  MUSCULOSKELETAL: No joint pain or swelling  PSYCHIATRIC: No  anxiety, mood swings  HEME/LYMPH: No bleeding gums  ALLERGY AND IMMUNOLOGIC: No hives or eczema	    All other ROS negative    PHYSICAL EXAM:  T(C): 36.7 (12-24-23 @ 15:00), Max: 36.7 (12-24-23 @ 15:00)  HR: 61 (12-24-23 @ 15:00) (61 - 73)  BP: 105/64 (12-24-23 @ 15:00) (87/55 - 105/64)  RR: 18 (12-24-23 @ 15:00) (18 - 22)  SpO2: 100% (12-24-23 @ 15:00) (100% - 100%)  Wt(kg): --  I&O's Summary      Appearance: Normal	  HEENT:   Normal oral mucosa, EOMI	  Cardiovascular:  S1 S2, No JVD,    Respiratory: Lungs clear to auscultation	  Psychiatry: Alert  Gastrointestinal:  Soft, Non-tender, + BS	  Skin: No rashes   Neurologic: Non-focal  Extremities:  No edema  Vascular: Peripheral pulses palpable    	    	  	  CARDIAC MARKERS:  Labs personally reviewed by me                                  11.6   3.83  )-----------( 257      ( 24 Dec 2023 16:05 )             36.4             Assessment /Plan:   82 y/o male, A&O x3, PMH HLD, CAD, and stage 4 prostate CA, presents to the ED c/o abdominal pain, constipation, and bloody stool.    1. Abdominal Pain   Associated with constipation and bloody stools (bright red)   CT A/P 12/22 concerning for rectal perforation   Trend Hgb   Sent by Dr Barajas for colorectal surg eval     2. CAD   s/p PCI RCA  2011   Cont asa if safe per surgery     3. HLD  C/w simvastatin 40mg QD          Differential diagnosis and plan of care discussed with patient after the evaluation. Counseling on diet, nutritional counseling, weight management, exercise and medication compliance was done.   Advanced care planning/advanced directives discussed with patient/family. DNR status including forceful chest compressions to attempt to restart the heart, ventilator support/artificial breathing, electric shock, artificial nutrition, health care proxy, Molst form all discussed with pt. Pt wishes to consider. More than fifteen minutes spent on discussing advanced directives.     SCOOTER Azevedo DO [facility]  Cardiovascular Medicine  [address] [place] [address], [address]  Office [phone]  Available via call or text on Microsoft Teams

## 2023-12-24 NOTE — ED PROVIDER NOTE - PHYSICAL EXAMINATION
Constitutional: VS reviewed. Alert and orientedx3, cachectic appearing, no apparent distress  HEENT: Atraumatic, EOMI  CV: RRR  Lungs: Clear and equal bilaterally, no wheezes, rales or crackles  Abdomen: Soft, nondistended, nontender  : + suprapubic TTP. Indwelling Snow in place with clear yellow urine in leg bag  MSK: No deformities  Skin: Warm and dry. As visualized no rashes, lesions, bruising or erythema  Neuro: Strength and sensation intact.   Lymph: No pitting edema in extremities.

## 2023-12-24 NOTE — ED ADULT NURSE NOTE - NSFALLUNIVINTERV_ED_ALL_ED
Bed/Stretcher in lowest position, wheels locked, appropriate side rails in place/Call bell, personal items and telephone in reach/Instruct patient to call for assistance before getting out of bed/chair/stretcher/Non-slip footwear applied when patient is off stretcher/[place] to call system/Physically safe environment - no spills, clutter or unnecessary equipment/Purposeful proactive rounding/Room/bathroom lighting operational, light cord in reach Bed/Stretcher in lowest position, wheels locked, appropriate side rails in place/Call bell, personal items and telephone in reach/Instruct patient to call for assistance before getting out of bed/chair/stretcher/Non-slip footwear applied when patient is off stretcher/[place] to call system/Physically safe environment - no spills, clutter or unnecessary equipment/Purposeful proactive rounding/Room/bathroom lighting operational, light cord in reach

## 2023-12-25 ENCOUNTER — TRANSCRIPTION ENCOUNTER (OUTPATIENT)
Age: 83
End: 2023-12-25

## 2023-12-25 DIAGNOSIS — C61 MALIGNANT NEOPLASM OF PROSTATE: ICD-10-CM

## 2023-12-25 DIAGNOSIS — I10 ESSENTIAL (PRIMARY) HYPERTENSION: ICD-10-CM

## 2023-12-25 DIAGNOSIS — K63.1 PERFORATION OF INTESTINE (NONTRAUMATIC): ICD-10-CM

## 2023-12-25 DIAGNOSIS — N39.0 URINARY TRACT INFECTION, SITE NOT SPECIFIED: ICD-10-CM

## 2023-12-25 DIAGNOSIS — F41.9 ANXIETY DISORDER, UNSPECIFIED: ICD-10-CM

## 2023-12-25 DIAGNOSIS — E78.5 HYPERLIPIDEMIA, UNSPECIFIED: ICD-10-CM

## 2023-12-25 DIAGNOSIS — I25.10 ATHEROSCLEROTIC HEART DISEASE OF NATIVE CORONARY ARTERY WITHOUT ANGINA PECTORIS: ICD-10-CM

## 2023-12-25 DIAGNOSIS — K21.9 GASTRO-ESOPHAGEAL REFLUX DISEASE WITHOUT ESOPHAGITIS: ICD-10-CM

## 2023-12-25 LAB
A1C WITH ESTIMATED AVERAGE GLUCOSE RESULT: 5.4 % — SIGNIFICANT CHANGE UP (ref 4–5.6)
A1C WITH ESTIMATED AVERAGE GLUCOSE RESULT: 5.4 % — SIGNIFICANT CHANGE UP (ref 4–5.6)
ALBUMIN SERPL ELPH-MCNC: 3.2 G/DL — LOW (ref 3.3–5)
ALBUMIN SERPL ELPH-MCNC: 3.2 G/DL — LOW (ref 3.3–5)
ALP SERPL-CCNC: 160 U/L — HIGH (ref 40–120)
ALP SERPL-CCNC: 160 U/L — HIGH (ref 40–120)
ALT FLD-CCNC: 12 U/L — SIGNIFICANT CHANGE UP (ref 10–45)
ALT FLD-CCNC: 12 U/L — SIGNIFICANT CHANGE UP (ref 10–45)
ANION GAP SERPL CALC-SCNC: 10 MMOL/L — SIGNIFICANT CHANGE UP (ref 5–17)
ANION GAP SERPL CALC-SCNC: 10 MMOL/L — SIGNIFICANT CHANGE UP (ref 5–17)
APTT BLD: 28.7 SEC — SIGNIFICANT CHANGE UP (ref 24.5–35.6)
APTT BLD: 28.7 SEC — SIGNIFICANT CHANGE UP (ref 24.5–35.6)
AST SERPL-CCNC: 24 U/L — SIGNIFICANT CHANGE UP (ref 10–40)
AST SERPL-CCNC: 24 U/L — SIGNIFICANT CHANGE UP (ref 10–40)
BASOPHILS # BLD AUTO: 0.05 K/UL — SIGNIFICANT CHANGE UP (ref 0–0.2)
BASOPHILS # BLD AUTO: 0.05 K/UL — SIGNIFICANT CHANGE UP (ref 0–0.2)
BASOPHILS NFR BLD AUTO: 1.2 % — SIGNIFICANT CHANGE UP (ref 0–2)
BASOPHILS NFR BLD AUTO: 1.2 % — SIGNIFICANT CHANGE UP (ref 0–2)
BILIRUB SERPL-MCNC: 0.3 MG/DL — SIGNIFICANT CHANGE UP (ref 0.2–1.2)
BILIRUB SERPL-MCNC: 0.3 MG/DL — SIGNIFICANT CHANGE UP (ref 0.2–1.2)
BUN SERPL-MCNC: 7 MG/DL — SIGNIFICANT CHANGE UP (ref 7–23)
BUN SERPL-MCNC: 7 MG/DL — SIGNIFICANT CHANGE UP (ref 7–23)
CALCIUM SERPL-MCNC: 8.9 MG/DL — SIGNIFICANT CHANGE UP (ref 8.4–10.5)
CALCIUM SERPL-MCNC: 8.9 MG/DL — SIGNIFICANT CHANGE UP (ref 8.4–10.5)
CHLORIDE SERPL-SCNC: 108 MMOL/L — SIGNIFICANT CHANGE UP (ref 96–108)
CHLORIDE SERPL-SCNC: 108 MMOL/L — SIGNIFICANT CHANGE UP (ref 96–108)
CHOLEST SERPL-MCNC: 155 MG/DL — SIGNIFICANT CHANGE UP
CHOLEST SERPL-MCNC: 155 MG/DL — SIGNIFICANT CHANGE UP
CO2 SERPL-SCNC: 24 MMOL/L — SIGNIFICANT CHANGE UP (ref 22–31)
CO2 SERPL-SCNC: 24 MMOL/L — SIGNIFICANT CHANGE UP (ref 22–31)
CREAT SERPL-MCNC: 1 MG/DL — SIGNIFICANT CHANGE UP (ref 0.5–1.3)
CREAT SERPL-MCNC: 1 MG/DL — SIGNIFICANT CHANGE UP (ref 0.5–1.3)
EGFR: 74 ML/MIN/1.73M2 — SIGNIFICANT CHANGE UP
EGFR: 74 ML/MIN/1.73M2 — SIGNIFICANT CHANGE UP
EOSINOPHIL # BLD AUTO: 0.18 K/UL — SIGNIFICANT CHANGE UP (ref 0–0.5)
EOSINOPHIL # BLD AUTO: 0.18 K/UL — SIGNIFICANT CHANGE UP (ref 0–0.5)
EOSINOPHIL NFR BLD AUTO: 4.4 % — SIGNIFICANT CHANGE UP (ref 0–6)
EOSINOPHIL NFR BLD AUTO: 4.4 % — SIGNIFICANT CHANGE UP (ref 0–6)
ESTIMATED AVERAGE GLUCOSE: 108 MG/DL — SIGNIFICANT CHANGE UP (ref 68–114)
ESTIMATED AVERAGE GLUCOSE: 108 MG/DL — SIGNIFICANT CHANGE UP (ref 68–114)
GLUCOSE SERPL-MCNC: 82 MG/DL — SIGNIFICANT CHANGE UP (ref 70–99)
GLUCOSE SERPL-MCNC: 82 MG/DL — SIGNIFICANT CHANGE UP (ref 70–99)
HCT VFR BLD CALC: 34.1 % — LOW (ref 39–50)
HCT VFR BLD CALC: 34.1 % — LOW (ref 39–50)
HDLC SERPL-MCNC: 67 MG/DL — SIGNIFICANT CHANGE UP
HDLC SERPL-MCNC: 67 MG/DL — SIGNIFICANT CHANGE UP
HGB BLD-MCNC: 11 G/DL — LOW (ref 13–17)
HGB BLD-MCNC: 11 G/DL — LOW (ref 13–17)
IMM GRANULOCYTES NFR BLD AUTO: 0.2 % — SIGNIFICANT CHANGE UP (ref 0–0.9)
IMM GRANULOCYTES NFR BLD AUTO: 0.2 % — SIGNIFICANT CHANGE UP (ref 0–0.9)
INR BLD: 1.08 RATIO — SIGNIFICANT CHANGE UP (ref 0.85–1.18)
INR BLD: 1.08 RATIO — SIGNIFICANT CHANGE UP (ref 0.85–1.18)
LIPID PNL WITH DIRECT LDL SERPL: 73 MG/DL — SIGNIFICANT CHANGE UP
LIPID PNL WITH DIRECT LDL SERPL: 73 MG/DL — SIGNIFICANT CHANGE UP
LYMPHOCYTES # BLD AUTO: 1.02 K/UL — SIGNIFICANT CHANGE UP (ref 1–3.3)
LYMPHOCYTES # BLD AUTO: 1.02 K/UL — SIGNIFICANT CHANGE UP (ref 1–3.3)
LYMPHOCYTES # BLD AUTO: 24.9 % — SIGNIFICANT CHANGE UP (ref 13–44)
LYMPHOCYTES # BLD AUTO: 24.9 % — SIGNIFICANT CHANGE UP (ref 13–44)
MAGNESIUM SERPL-MCNC: 2.2 MG/DL — SIGNIFICANT CHANGE UP (ref 1.6–2.6)
MAGNESIUM SERPL-MCNC: 2.2 MG/DL — SIGNIFICANT CHANGE UP (ref 1.6–2.6)
MCHC RBC-ENTMCNC: 29.7 PG — SIGNIFICANT CHANGE UP (ref 27–34)
MCHC RBC-ENTMCNC: 29.7 PG — SIGNIFICANT CHANGE UP (ref 27–34)
MCHC RBC-ENTMCNC: 32.3 GM/DL — SIGNIFICANT CHANGE UP (ref 32–36)
MCHC RBC-ENTMCNC: 32.3 GM/DL — SIGNIFICANT CHANGE UP (ref 32–36)
MCV RBC AUTO: 92.2 FL — SIGNIFICANT CHANGE UP (ref 80–100)
MCV RBC AUTO: 92.2 FL — SIGNIFICANT CHANGE UP (ref 80–100)
MONOCYTES # BLD AUTO: 0.45 K/UL — SIGNIFICANT CHANGE UP (ref 0–0.9)
MONOCYTES # BLD AUTO: 0.45 K/UL — SIGNIFICANT CHANGE UP (ref 0–0.9)
MONOCYTES NFR BLD AUTO: 11 % — SIGNIFICANT CHANGE UP (ref 2–14)
MONOCYTES NFR BLD AUTO: 11 % — SIGNIFICANT CHANGE UP (ref 2–14)
MRSA PCR RESULT.: SIGNIFICANT CHANGE UP
MRSA PCR RESULT.: SIGNIFICANT CHANGE UP
NEUTROPHILS # BLD AUTO: 2.38 K/UL — SIGNIFICANT CHANGE UP (ref 1.8–7.4)
NEUTROPHILS # BLD AUTO: 2.38 K/UL — SIGNIFICANT CHANGE UP (ref 1.8–7.4)
NEUTROPHILS NFR BLD AUTO: 58.3 % — SIGNIFICANT CHANGE UP (ref 43–77)
NEUTROPHILS NFR BLD AUTO: 58.3 % — SIGNIFICANT CHANGE UP (ref 43–77)
NON HDL CHOLESTEROL: 88 MG/DL — SIGNIFICANT CHANGE UP
NON HDL CHOLESTEROL: 88 MG/DL — SIGNIFICANT CHANGE UP
NRBC # BLD: 0 /100 WBCS — SIGNIFICANT CHANGE UP (ref 0–0)
NRBC # BLD: 0 /100 WBCS — SIGNIFICANT CHANGE UP (ref 0–0)
PHOSPHATE SERPL-MCNC: 3.4 MG/DL — SIGNIFICANT CHANGE UP (ref 2.5–4.5)
PHOSPHATE SERPL-MCNC: 3.4 MG/DL — SIGNIFICANT CHANGE UP (ref 2.5–4.5)
PLATELET # BLD AUTO: 254 K/UL — SIGNIFICANT CHANGE UP (ref 150–400)
PLATELET # BLD AUTO: 254 K/UL — SIGNIFICANT CHANGE UP (ref 150–400)
POTASSIUM SERPL-MCNC: 3.3 MMOL/L — LOW (ref 3.5–5.3)
POTASSIUM SERPL-MCNC: 3.3 MMOL/L — LOW (ref 3.5–5.3)
POTASSIUM SERPL-SCNC: 3.3 MMOL/L — LOW (ref 3.5–5.3)
POTASSIUM SERPL-SCNC: 3.3 MMOL/L — LOW (ref 3.5–5.3)
PROT SERPL-MCNC: 6 G/DL — SIGNIFICANT CHANGE UP (ref 6–8.3)
PROT SERPL-MCNC: 6 G/DL — SIGNIFICANT CHANGE UP (ref 6–8.3)
PROTHROM AB SERPL-ACNC: 11.3 SEC — SIGNIFICANT CHANGE UP (ref 9.5–13)
PROTHROM AB SERPL-ACNC: 11.3 SEC — SIGNIFICANT CHANGE UP (ref 9.5–13)
RBC # BLD: 3.7 M/UL — LOW (ref 4.2–5.8)
RBC # BLD: 3.7 M/UL — LOW (ref 4.2–5.8)
RBC # FLD: 16.4 % — HIGH (ref 10.3–14.5)
RBC # FLD: 16.4 % — HIGH (ref 10.3–14.5)
S AUREUS DNA NOSE QL NAA+PROBE: SIGNIFICANT CHANGE UP
S AUREUS DNA NOSE QL NAA+PROBE: SIGNIFICANT CHANGE UP
SODIUM SERPL-SCNC: 142 MMOL/L — SIGNIFICANT CHANGE UP (ref 135–145)
SODIUM SERPL-SCNC: 142 MMOL/L — SIGNIFICANT CHANGE UP (ref 135–145)
T3 SERPL-MCNC: 46 NG/DL — LOW (ref 80–200)
T3 SERPL-MCNC: 46 NG/DL — LOW (ref 80–200)
T4 FREE SERPL-MCNC: 1 NG/DL — SIGNIFICANT CHANGE UP (ref 0.9–1.8)
T4 FREE SERPL-MCNC: 1 NG/DL — SIGNIFICANT CHANGE UP (ref 0.9–1.8)
TRIGL SERPL-MCNC: 77 MG/DL — SIGNIFICANT CHANGE UP
TRIGL SERPL-MCNC: 77 MG/DL — SIGNIFICANT CHANGE UP
TSH SERPL-MCNC: 7.81 UIU/ML — HIGH (ref 0.27–4.2)
TSH SERPL-MCNC: 7.81 UIU/ML — HIGH (ref 0.27–4.2)
WBC # BLD: 4.09 K/UL — SIGNIFICANT CHANGE UP (ref 3.8–10.5)
WBC # BLD: 4.09 K/UL — SIGNIFICANT CHANGE UP (ref 3.8–10.5)
WBC # FLD AUTO: 4.09 K/UL — SIGNIFICANT CHANGE UP (ref 3.8–10.5)
WBC # FLD AUTO: 4.09 K/UL — SIGNIFICANT CHANGE UP (ref 3.8–10.5)

## 2023-12-25 PROCEDURE — 99222 1ST HOSP IP/OBS MODERATE 55: CPT

## 2023-12-25 PROCEDURE — 99223 1ST HOSP IP/OBS HIGH 75: CPT

## 2023-12-25 RX ORDER — INFLUENZA VIRUS VACCINE 15; 15; 15; 15 UG/.5ML; UG/.5ML; UG/.5ML; UG/.5ML
0.7 SUSPENSION INTRAMUSCULAR ONCE
Refills: 0 | Status: DISCONTINUED | OUTPATIENT
Start: 2023-12-25 | End: 2023-12-28

## 2023-12-25 RX ORDER — SODIUM CHLORIDE 9 MG/ML
1000 INJECTION, SOLUTION INTRAVENOUS
Refills: 0 | Status: DISCONTINUED | OUTPATIENT
Start: 2023-12-25 | End: 2023-12-28

## 2023-12-25 RX ORDER — NALOXONE HYDROCHLORIDE 4 MG/.1ML
0.4 SPRAY NASAL ONCE
Refills: 0 | Status: DISCONTINUED | OUTPATIENT
Start: 2023-12-25 | End: 2023-12-28

## 2023-12-25 RX ORDER — FINASTERIDE 5 MG/1
5 TABLET, FILM COATED ORAL DAILY
Refills: 0 | Status: DISCONTINUED | OUTPATIENT
Start: 2023-12-25 | End: 2023-12-28

## 2023-12-25 RX ORDER — TAMSULOSIN HYDROCHLORIDE 0.4 MG/1
0.4 CAPSULE ORAL AT BEDTIME
Refills: 0 | Status: DISCONTINUED | OUTPATIENT
Start: 2023-12-25 | End: 2023-12-28

## 2023-12-25 RX ORDER — OXYCODONE HYDROCHLORIDE 5 MG/1
5 TABLET ORAL EVERY 4 HOURS
Refills: 0 | Status: DISCONTINUED | OUTPATIENT
Start: 2023-12-25 | End: 2023-12-28

## 2023-12-25 RX ORDER — POTASSIUM CHLORIDE 20 MEQ
40 PACKET (EA) ORAL ONCE
Refills: 0 | Status: COMPLETED | OUTPATIENT
Start: 2023-12-25 | End: 2023-12-25

## 2023-12-25 RX ORDER — SENNA PLUS 8.6 MG/1
2 TABLET ORAL AT BEDTIME
Refills: 0 | Status: DISCONTINUED | OUTPATIENT
Start: 2023-12-25 | End: 2023-12-28

## 2023-12-25 RX ORDER — OXYCODONE HYDROCHLORIDE 5 MG/1
2.5 TABLET ORAL EVERY 4 HOURS
Refills: 0 | Status: DISCONTINUED | OUTPATIENT
Start: 2023-12-25 | End: 2023-12-28

## 2023-12-25 RX ORDER — POLYETHYLENE GLYCOL 3350 17 G/17G
17 POWDER, FOR SOLUTION ORAL DAILY
Refills: 0 | Status: DISCONTINUED | OUTPATIENT
Start: 2023-12-25 | End: 2023-12-28

## 2023-12-25 RX ORDER — PANTOPRAZOLE SODIUM 20 MG/1
40 TABLET, DELAYED RELEASE ORAL
Refills: 0 | Status: DISCONTINUED | OUTPATIENT
Start: 2023-12-25 | End: 2023-12-28

## 2023-12-25 RX ORDER — CHLORHEXIDINE GLUCONATE 213 G/1000ML
1 SOLUTION TOPICAL DAILY
Refills: 0 | Status: DISCONTINUED | OUTPATIENT
Start: 2023-12-25 | End: 2023-12-28

## 2023-12-25 RX ORDER — LANOLIN ALCOHOL/MO/W.PET/CERES
3 CREAM (GRAM) TOPICAL AT BEDTIME
Refills: 0 | Status: DISCONTINUED | OUTPATIENT
Start: 2023-12-25 | End: 2023-12-28

## 2023-12-25 RX ORDER — FLUOXETINE HCL 10 MG
20 CAPSULE ORAL DAILY
Refills: 0 | Status: DISCONTINUED | OUTPATIENT
Start: 2023-12-25 | End: 2023-12-28

## 2023-12-25 RX ORDER — ONDANSETRON 8 MG/1
4 TABLET, FILM COATED ORAL EVERY 8 HOURS
Refills: 0 | Status: DISCONTINUED | OUTPATIENT
Start: 2023-12-25 | End: 2023-12-28

## 2023-12-25 RX ORDER — SIMVASTATIN 20 MG/1
40 TABLET, FILM COATED ORAL AT BEDTIME
Refills: 0 | Status: DISCONTINUED | OUTPATIENT
Start: 2023-12-25 | End: 2023-12-28

## 2023-12-25 RX ORDER — ACETAMINOPHEN 500 MG
650 TABLET ORAL EVERY 6 HOURS
Refills: 0 | Status: DISCONTINUED | OUTPATIENT
Start: 2023-12-25 | End: 2023-12-28

## 2023-12-25 RX ORDER — PIPERACILLIN AND TAZOBACTAM 4; .5 G/20ML; G/20ML
3.38 INJECTION, POWDER, LYOPHILIZED, FOR SOLUTION INTRAVENOUS EVERY 8 HOURS
Refills: 0 | Status: DISCONTINUED | OUTPATIENT
Start: 2023-12-25 | End: 2023-12-28

## 2023-12-25 RX ORDER — ASPIRIN/CALCIUM CARB/MAGNESIUM 324 MG
81 TABLET ORAL DAILY
Refills: 0 | Status: DISCONTINUED | OUTPATIENT
Start: 2023-12-25 | End: 2023-12-28

## 2023-12-25 RX ADMIN — POLYETHYLENE GLYCOL 3350 17 GRAM(S): 17 POWDER, FOR SOLUTION ORAL at 12:21

## 2023-12-25 RX ADMIN — SENNA PLUS 2 TABLET(S): 8.6 TABLET ORAL at 21:00

## 2023-12-25 RX ADMIN — PANTOPRAZOLE SODIUM 40 MILLIGRAM(S): 20 TABLET, DELAYED RELEASE ORAL at 07:05

## 2023-12-25 RX ADMIN — SODIUM CHLORIDE 100 MILLILITER(S): 9 INJECTION, SOLUTION INTRAVENOUS at 00:54

## 2023-12-25 RX ADMIN — CHLORHEXIDINE GLUCONATE 1 APPLICATION(S): 213 SOLUTION TOPICAL at 12:21

## 2023-12-25 RX ADMIN — Medication 20 MILLIGRAM(S): at 11:04

## 2023-12-25 RX ADMIN — Medication 81 MILLIGRAM(S): at 12:21

## 2023-12-25 RX ADMIN — SIMVASTATIN 40 MILLIGRAM(S): 20 TABLET, FILM COATED ORAL at 21:00

## 2023-12-25 RX ADMIN — PIPERACILLIN AND TAZOBACTAM 25 GRAM(S): 4; .5 INJECTION, POWDER, LYOPHILIZED, FOR SOLUTION INTRAVENOUS at 21:00

## 2023-12-25 RX ADMIN — PIPERACILLIN AND TAZOBACTAM 25 GRAM(S): 4; .5 INJECTION, POWDER, LYOPHILIZED, FOR SOLUTION INTRAVENOUS at 13:10

## 2023-12-25 RX ADMIN — Medication 40 MILLIEQUIVALENT(S): at 12:20

## 2023-12-25 RX ADMIN — TAMSULOSIN HYDROCHLORIDE 0.4 MILLIGRAM(S): 0.4 CAPSULE ORAL at 21:00

## 2023-12-25 RX ADMIN — FINASTERIDE 5 MILLIGRAM(S): 5 TABLET, FILM COATED ORAL at 11:04

## 2023-12-25 RX ADMIN — PIPERACILLIN AND TAZOBACTAM 25 GRAM(S): 4; .5 INJECTION, POWDER, LYOPHILIZED, FOR SOLUTION INTRAVENOUS at 07:05

## 2023-12-25 NOTE — CONSULT NOTE ADULT - SUBJECTIVE AND OBJECTIVE BOX
HPI:        PAST MEDICAL & SURGICAL HISTORY:  CAD (Coronary Artery Disease)  Coronary Stent  in 2005  Hypercholesterolemia  BPH (Benign Prostatic Hypertrophy)  Depression  S/P Manipulation of Deviated Nasal Septum  History of Spinal Surgery  l/s spine          MEDICATIONS  (STANDING):  finasteride 5 milliGRAM(s) Oral daily  FLUoxetine 20 milliGRAM(s) Oral daily  lactated ringers. 1000 milliLiter(s) (100 mL/Hr) IV Continuous <Continuous>  naloxone Injectable 0.4 milliGRAM(s) IV Push once  pantoprazole    Tablet 40 milliGRAM(s) Oral before breakfast  piperacillin/tazobactam IVPB.. 3.375 Gram(s) IV Intermittent every 8 hours  polyethylene glycol 3350 17 Gram(s) Oral daily  senna 2 Tablet(s) Oral at bedtime  simvastatin 40 milliGRAM(s) Oral at bedtime  tamsulosin 0.4 milliGRAM(s) Oral at bedtime    MEDICATIONS  (PRN):  acetaminophen     Tablet .. 650 milliGRAM(s) Oral every 6 hours PRN Temp greater or equal to 38C (100.4F), Mild Pain (1 - 3)  aluminum hydroxide/magnesium hydroxide/simethicone Suspension 30 milliLiter(s) Oral every 4 hours PRN Dyspepsia  bisacodyl 5 milliGRAM(s) Oral daily PRN Constipation  melatonin 3 milliGRAM(s) Oral at bedtime PRN Insomnia  ondansetron Injectable 4 milliGRAM(s) IV Push every 8 hours PRN Nausea and/or Vomiting  oxyCODONE    IR 5 milliGRAM(s) Oral every 4 hours PRN Severe Pain (7 - 10)  oxyCODONE    IR 2.5 milliGRAM(s) Oral every 4 hours PRN Moderate Pain (4 - 6)      Allergies    No Known Allergies    Intolerances        SOCIAL HISTORY:    FAMILY HISTORY:          Physical Exam:  General: NAD, resting comfortably  HEENT: NC/AT, EOMI, normal hearing, no oral lesions, no LAD, neck supple  Pulmonary: normal resp effort, CTA-B  Cardiovascular: NSR, no murmurs  Abdominal: soft, ND/NT, no organomegaly  Extremities: WWP, normal strength, no clubbing/cyanosis/edema  Neuro: A/O x 3, CNs II-XII grossly intact, normal sensation, no focal deficits  Pulses: palpable distal pulses    Vital Signs Last 24 Hrs  T(C): 36.7 (25 Dec 2023 00:37), Max: 36.8 (24 Dec 2023 22:25)  T(F): 98.1 (25 Dec 2023 00:37), Max: 98.3 (24 Dec 2023 22:25)  HR: 76 (25 Dec 2023 00:37) (58 - 76)  BP: 104/71 (25 Dec 2023 00:37) (87/55 - 127/57)  BP(mean): 76 (24 Dec 2023 15:00) (76 - 76)  RR: 18 (25 Dec 2023 00:37) (15 - 22)  SpO2: 97% (25 Dec 2023 00:37) (95% - 100%)    Parameters below as of 25 Dec 2023 00:37  Patient On (Oxygen Delivery Method): room air        I&O's Summary          LABS:                        11.6   3.83  )-----------( 257      ( 24 Dec 2023 16:05 )             36.4     12-    138  |  105  |  9   ----------------------------<  116<H>  3.4<L>   |  25  |  1.00    Ca    9.6      24 Dec 2023 16:05    TPro  7.1  /  Alb  3.7  /  TBili  0.4  /  DBili  x   /  AST  29  /  ALT  16  /  AlkPhos  187<H>  12-24    PT/INR - ( 24 Dec 2023 16:05 )   PT: 10.9 sec;   INR: 1.04 ratio         PTT - ( 24 Dec 2023 16:05 )  PTT:28.4 sec  Urinalysis Basic - ( 24 Dec 2023 16:05 )    Color: Yellow / Appearance: Cloudy / S.023 / pH: x  Gluc: 116 mg/dL / Ketone: Negative mg/dL  / Bili: Negative / Urobili: 1.0 mg/dL   Blood: x / Protein: Trace mg/dL / Nitrite: Positive   Leuk Esterase: Large / RBC: 1 /HPF / WBC 98 /HPF   Sq Epi: x / Non Sq Epi: 0 /HPF / Bacteria: Moderate /HPF      CAPILLARY BLOOD GLUCOSE        LIVER FUNCTIONS - ( 24 Dec 2023 16:05 )  Alb: 3.7 g/dL / Pro: 7.1 g/dL / ALK PHOS: 187 U/L / ALT: 16 U/L / AST: 29 U/L / GGT: x             Cultures:      RADIOLOGY & ADDITIONAL STUDIES:      Plan:           HPI:  83yoM w/ PMHx of HTN, HLD, CAD s/p PCI w/ stent, sah, gerd, depression, metastatic prostate ca, p/w abdominal pain (x1 week in duration, over lower quadrants and suprapubic region, non radiating, 7-8/10 in intensity; a/w constipation, hematochezia, dysuria; had been seen at OSH ER and by outpatient provider; was diagnosed with proctitis and uti and started on po abx with no relief in symptoms; so had CT imaging done of abdo/pelv which showed findings suggestive of proctitis w rectal perforation + ascending uti; sent to ED by outpatient provider.    Patient was seen and examined in ED. Hemodynamically stable and cachetic appearing. Denies nausea/vomiting, SOB, fever/chills, chest pain, headache, hemoptysis hematemesis. Patient reports regular BMs and having flatulence. Reports most recent BM requiring straining to expel and once he wiped he saw blood on the tp, but denies hematochezia.      PAST MEDICAL & SURGICAL HISTORY:  CAD (Coronary Artery Disease)  Coronary Stent  in   Hypercholesterolemia  BPH (Benign Prostatic Hypertrophy)  Depression  S/P Manipulation of Deviated Nasal Septum  History of Spinal Surgery  l/s spine        MEDICATIONS  (STANDING):  finasteride 5 milliGRAM(s) Oral daily  FLUoxetine 20 milliGRAM(s) Oral daily  lactated ringers. 1000 milliLiter(s) (100 mL/Hr) IV Continuous <Continuous>  naloxone Injectable 0.4 milliGRAM(s) IV Push once  pantoprazole    Tablet 40 milliGRAM(s) Oral before breakfast  piperacillin/tazobactam IVPB.. 3.375 Gram(s) IV Intermittent every 8 hours  polyethylene glycol 3350 17 Gram(s) Oral daily  senna 2 Tablet(s) Oral at bedtime  simvastatin 40 milliGRAM(s) Oral at bedtime  tamsulosin 0.4 milliGRAM(s) Oral at bedtime    MEDICATIONS  (PRN):  acetaminophen     Tablet .. 650 milliGRAM(s) Oral every 6 hours PRN Temp greater or equal to 38C (100.4F), Mild Pain (1 - 3)  aluminum hydroxide/magnesium hydroxide/simethicone Suspension 30 milliLiter(s) Oral every 4 hours PRN Dyspepsia  bisacodyl 5 milliGRAM(s) Oral daily PRN Constipation  melatonin 3 milliGRAM(s) Oral at bedtime PRN Insomnia  ondansetron Injectable 4 milliGRAM(s) IV Push every 8 hours PRN Nausea and/or Vomiting  oxyCODONE    IR 5 milliGRAM(s) Oral every 4 hours PRN Severe Pain (7 - 10)  oxyCODONE    IR 2.5 milliGRAM(s) Oral every 4 hours PRN Moderate Pain (4 - 6)      Allergies    No Known Allergies    Intolerances        SOCIAL HISTORY:  lives with son Mohsen, who is health care proxy/decision-maker    FAMILY HISTORY:      Physical Exam:  General: NAD, resting comfortably  HEENT: NC/AT, EOMI, normal hearing  Pulmonary: normal resp effort  Cardiovascular: NSR  Abdominal: soft, nondistended, tender in the LLQ and suprapubic area  Extremities: WWP, normal strength, no clubbing/cyanosis/edema  Neuro: A/O x 3, CNs II-XII grossly intact, normal sensation, no focal deficits  Pulses: palpable distal pulses    Vital Signs Last 24 Hrs  T(C): 36.7 (25 Dec 2023 00:37), Max: 36.8 (24 Dec 2023 22:25)  T(F): 98.1 (25 Dec 2023 00:37), Max: 98.3 (24 Dec 2023 22:25)  HR: 76 (25 Dec 2023 00:37) (58 - 76)  BP: 104/71 (25 Dec 2023 00:37) (87/55 - 127/57)  BP(mean): 76 (24 Dec 2023 15:00) (76 - 76)  RR: 18 (25 Dec 2023 00:37) (15 - 22)  SpO2: 97% (25 Dec 2023 00:37) (95% - 100%)    Parameters below as of 25 Dec 2023 00:37  Patient On (Oxygen Delivery Method): room air        I&O's Summary          LABS:                        11.6   3.83  )-----------( 257      ( 24 Dec 2023 16:05 )             36.4     12-    138  |  105  |  9   ----------------------------<  116<H>  3.4<L>   |  25  |  1.00    Ca    9.6      24 Dec 2023 16:05    TPro  7.1  /  Alb  3.7  /  TBili  0.4  /  DBili  x   /  AST  29  /  ALT  16  /  AlkPhos  187<H>  12-24    PT/INR - ( 24 Dec 2023 16:05 )   PT: 10.9 sec;   INR: 1.04 ratio         PTT - ( 24 Dec 2023 16:05 )  PTT:28.4 sec  Urinalysis Basic - ( 24 Dec 2023 16:05 )    Color: Yellow / Appearance: Cloudy / S.023 / pH: x  Gluc: 116 mg/dL / Ketone: Negative mg/dL  / Bili: Negative / Urobili: 1.0 mg/dL   Blood: x / Protein: Trace mg/dL / Nitrite: Positive   Leuk Esterase: Large / RBC: 1 /HPF / WBC 98 /HPF   Sq Epi: x / Non Sq Epi: 0 /HPF / Bacteria: Moderate /HPF      CAPILLARY BLOOD GLUCOSE        LIVER FUNCTIONS - ( 24 Dec 2023 16:05 )  Alb: 3.7 g/dL / Pro: 7.1 g/dL / ALK PHOS: 187 U/L / ALT: 16 U/L / AST: 29 U/L / GGT: x             Cultures:      RADIOLOGY & ADDITIONAL STUDIES:      Plan:           HPI:  83yoM w/ PMHx of HTN, HLD, CAD s/p PCI w/ stent, sah, gerd, depression, metastatic prostate ca, p/w abdominal pain (x1 week in duration, over lower quadrants and suprapubic region, non radiating, 7-8/10 in intensity; a/w constipation, hematochezia, dysuria; had been seen at OSH ER and by outpatient provider; was diagnosed with proctitis and uti and started on po abx with no relief in symptoms; so had CT imaging done of abdo/pelv which showed findings suggestive of proctitis w rectal perforation + ascending uti; sent to ED by outpatient provider.    Patient was seen and examined in ED. Hemodynamically stable and cachetic appearing. Denies nausea/vomiting, SOB, fever/chills, chest pain, headache, hemoptysis hematemesis. Patient reports regular BMs and having flatulence. Reports most recent BM requiring straining to expel and once he wiped he saw blood on the tp, but denies hematochezia. Physical exam notable for LLQ pain w/o peritonitis.    Prostate cancer:   < from: MR TADEO Prostate (23 @ 17:30) >  IMPRESSION:  Diffuse tumor infiltration throughout thebilateral peripheral zone with direct extension into the anterior rectal wall. Additional high suspicion lesion in the right anterior apex transition zone.PIRADS 5 - Very high (clinically significant cancer is highly likely to be present)    Right-sided seminal vesicle invasion is suspected. Pathologic lymphadenopathy along the superior rectal and inferior mesenteric chain. Diffuse osseous metastatic disease.        PAST MEDICAL & SURGICAL HISTORY:  CAD (Coronary Artery Disease)  Coronary Stent  in   Hypercholesterolemia  BPH (Benign Prostatic Hypertrophy)  Depression  S/P Manipulation of Deviated Nasal Septum  History of Spinal Surgery  l/s spine        MEDICATIONS  (STANDING):  finasteride 5 milliGRAM(s) Oral daily  FLUoxetine 20 milliGRAM(s) Oral daily  lactated ringers. 1000 milliLiter(s) (100 mL/Hr) IV Continuous <Continuous>  naloxone Injectable 0.4 milliGRAM(s) IV Push once  pantoprazole    Tablet 40 milliGRAM(s) Oral before breakfast  piperacillin/tazobactam IVPB.. 3.375 Gram(s) IV Intermittent every 8 hours  polyethylene glycol 3350 17 Gram(s) Oral daily  senna 2 Tablet(s) Oral at bedtime  simvastatin 40 milliGRAM(s) Oral at bedtime  tamsulosin 0.4 milliGRAM(s) Oral at bedtime    MEDICATIONS  (PRN):  acetaminophen     Tablet .. 650 milliGRAM(s) Oral every 6 hours PRN Temp greater or equal to 38C (100.4F), Mild Pain (1 - 3)  aluminum hydroxide/magnesium hydroxide/simethicone Suspension 30 milliLiter(s) Oral every 4 hours PRN Dyspepsia  bisacodyl 5 milliGRAM(s) Oral daily PRN Constipation  melatonin 3 milliGRAM(s) Oral at bedtime PRN Insomnia  ondansetron Injectable 4 milliGRAM(s) IV Push every 8 hours PRN Nausea and/or Vomiting  oxyCODONE    IR 5 milliGRAM(s) Oral every 4 hours PRN Severe Pain (7 - 10)  oxyCODONE    IR 2.5 milliGRAM(s) Oral every 4 hours PRN Moderate Pain (4 - 6)      Allergies    No Known Allergies    Intolerances        SOCIAL HISTORY:  lives with son Mohsen, who is health care proxy/decision-maker    FAMILY HISTORY:      Physical Exam:  General: NAD, resting comfortably  HEENT: NC/AT, EOMI, normal hearing  Pulmonary: normal resp effort  Cardiovascular: NSR  Abdominal: soft, nondistended, tender in the LLQ and suprapubic area, no rebound or guarding  Anus: no digital rectal exam, nontender anus, no erythema or crepitus noted  Extremities: WWP, normal strength, no clubbing/cyanosis/edema  Neuro: A/O x 3, CNs II-XII grossly intact, normal sensation, no focal deficits  Pulses: palpable distal pulses    Vital Signs Last 24 Hrs  T(C): 36.7 (25 Dec 2023 00:37), Max: 36.8 (24 Dec 2023 22:25)  T(F): 98.1 (25 Dec 2023 00:37), Max: 98.3 (24 Dec 2023 22:25)  HR: 76 (25 Dec 2023 00:37) (58 - 76)  BP: 104/71 (25 Dec 2023 00:37) (87/55 - 127/57)  BP(mean): 76 (24 Dec 2023 15:00) (76 - 76)  RR: 18 (25 Dec 2023 00:37) (15 - 22)  SpO2: 97% (25 Dec 2023 00:37) (95% - 100%)    Parameters below as of 25 Dec 2023 00:37  Patient On (Oxygen Delivery Method): room air      LABS:                        11.6   3.83  )-----------( 257      ( 24 Dec 2023 16:05 )             36.4     12    138  |  105  |  9   ----------------------------<  116<H>  3.4<L>   |  25  |  1.00    Ca    9.6      24 Dec 2023 16:05    TPro  7.1  /  Alb  3.7  /  TBili  0.4  /  DBili  x   /  AST  29  /  ALT  16  /  AlkPhos  187<H>      PT/INR - ( 24 Dec 2023 16:05 )   PT: 10.9 sec;   INR: 1.04 ratio         PTT - ( 24 Dec 2023 16:05 )  PTT:28.4 sec  Urinalysis Basic - ( 24 Dec 2023 16:05 )    Color: Yellow / Appearance: Cloudy / S.023 / pH: x  Gluc: 116 mg/dL / Ketone: Negative mg/dL  / Bili: Negative / Urobili: 1.0 mg/dL   Blood: x / Protein: Trace mg/dL / Nitrite: Positive   Leuk Esterase: Large / RBC: 1 /HPF / WBC 98 /HPF   Sq Epi: x / Non Sq Epi: 0 /HPF / Bacteria: Moderate /HPF    LIVER FUNCTIONS - ( 24 Dec 2023 16:05 )  Alb: 3.7 g/dL / Pro: 7.1 g/dL / ALK PHOS: 187 U/L / ALT: 16 U/L / AST: 29 U/L / GGT: x             RADIOLOGY & ADDITIONAL STUDIES:  < from: CT Abdomen and Pelvis w/ IV Cont (23 @ 16:46) >  FINDINGS:    LOWER CHEST: Multivessel coronary artery calcifications versus stents.   Correlate with history. Small aortic valve calcification. Mitral annular   calcification. Small hiatal hernia. No visualized pleural effusion.    LIVER: Liver size within normal limits. Main portal vein is patent. 4.4   cm hypodense liver lesion with peripheral nodular hypervascularity most   consistent with a hemangioma. Additional indeterminate inferior right   hepatic lesion measures 16 mm, image 49 series 3.  BILE DUCTS: No distention  GALLBLADDER: Contracted gallbladder  SPLEEN: Spleen size within normal limits  PANCREAS: No acute peripancreatic inflammation  ADRENALS: Unremarkable  KIDNEYS/URETERS: No hydronephrosis. Bilateral renal cysts, with calcified   septations on the right. Bilateral ureteral enhancement, right greater   than left. No obstructing ureteral calculus.    BLADDER: Underdistended with in situ Snow catheter. Bladder wall   thickening despite underdistention and surrounding inflammatory changes   concerning for cystitis.  REPRODUCTIVE ORGANS: Enlarged heterogeneous prostate, 6 cm in transverse   dimension.    BOWEL: Small hiatal hernia. Stomach is underdistended. Small   periampullary duodenal diverticulum. Appendix is unremarkable. Mild stool   burden of the colon. Findings of proctitis with known tumor extension   from prior imaging. Marked mid/lower rectal wall thickening,   hypervascularity, and extraluminal air extending through the anal   sphincter complex down to the anal verge. Given the absence of recent   procedure in this location, findings are concerning for rectal   perforation.  PERITONEUM: Trace fluid  VESSELS: No abdominal aortic aneurysm. Atheromatous changes.  RETROPERITONEUM/LYMPH NODES: Small volume nodes not enlarged by CT size   criteria. Nonspecific retroperitoneal stranding extending along the iliac   chains.  ABDOMINAL WALL: Tiny fat-containing right inguinal hernia.  BONES: Innumerable osseous osseous sclerotic lesions again reflective of   osseous metastatic disease. Mild L1 and L3 vertebral body compression   deformities, age indeterminate. Degenerative changes. Paralumbar   laminectomy.    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 Marked mid/lower rectal wall thickening, hypervascularity, and   extraluminal air extending through the anal sphincter complex down to the   anal verge. Given the absence of recent procedure in this location,   findings are concerning for rectal perforation.    Additional findings concerning for urinary bladder cystitis and ascending   urinary tract infection. No CT evidence of pyelonephritis. No obstructing   ureteral calculus or hydronephrosis.    These above findings were discussed with Dr. Barajas on 2023 at 8PM.    Enlarged heterogeneous prostate. Innumerable osseous metastases   consistent with known metastatic prostate malignancy. Mild L1 and L3   vertebral body compression deformities, age indeterminate.    Indeterminate inferior right hepatic lesion measures 1.6 cm, image 49   series 3. Additional 4.4 cm left hepatic lesion is most consistent with a   hemangioma. Abdominal MRI is recommended for characterizationwhen   feasible.    < end of copied text >      < from: CT Chest No Cont (23 @ 16:45) >  FINDINGS:    LUNGS/AIRWAYS/PLEURA: Patent trachea and bronchi. Sub-4 mm solid nodules   in the apical segment of the right upper lobe and along the horizontal   fissure.    LYMPH NODES/MEDIASTINUM: No lymphadenopathy. Small hiatal hernia.    HEART/VASCULATURE: Normal sized heart and aorta. No pericardial effusion.   Heavily calcified coronary arteries.    UPPER ABDOMEN: Reported separately on same day CT abdomen/pelvis.    BONES/SOFT TISSUES:Sclerotic lesions throughout the axial skeleton. Loss   of height of multiple vertebral bodies.      IMPRESSION:    Diffuse skeletal metastases.    Nonspecific very small right upper lobe solid nodules.    < end of copied text >

## 2023-12-25 NOTE — H&P ADULT - PROBLEM SELECTOR PLAN 8
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 Innumerable osseous metastases consistent with known metastatic prostate malignancy. Mild L1 and L3 vertebral body compression deformities, age indeterminate;" additionally, also mentions, "4.4 cm left hepatic lesion is most consistent with a hemangioma." however, also mentions "Indeterminate inferior right hepatic lesion measures 1.6 cm....Abdominal MRI is recommended for characterization when feasible."  ?liver mets?  cont home tamsulosin + finasteride  currently on zytiga, casodex, eligard inj + prednisone for treatment; hold for now, resume on discharge  outpatient follow up abdominal mri + urology follow up for further mgmt

## 2023-12-25 NOTE — DISCHARGE NOTE PROVIDER - NPI NUMBER (FOR SYSADMIN USE ONLY) :
[[id number]] [[id number] [[id number] [[id number]],[[id number]] [[id number]],[[id number]] [[id number]],[[id number]]

## 2023-12-25 NOTE — DISCHARGE NOTE PROVIDER - NSFOLLOWUPCLINICS_GEN_ALL_ED_FT
[facility]  Internal Medicine  [address] [place] [address]  Phone: [phone]  Fax:   Follow Up Time: 1 week     [facility]  Internal Medicine  [address] [place] [address]  Phone: [phone]  Fax:   Follow Up Time: 1 week     [facility]  Internal Medicine  [address] [place] [address]  Phone: [phone]  Fax:   Follow Up Time: 1 week

## 2023-12-25 NOTE — DISCHARGE NOTE PROVIDER - NSDCCPTREATMENT_GEN_ALL_CORE_FT
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 Marked mid/lower rectal wall thickening, hypervascularity, and   extraluminal air extending through the anal sphincter complex down to the   anal verge. Given the absence of recent procedure in this location,   findings are concerning for rectal perforation.  Additional findings concerning for urinary bladder cystitis and ascending   urinary tract infection. No CT evidence of pyelonephritis. No obstructing   ureteral calculus or hydronephrosis.  These above findings were discussed with Dr. Barajas on 12/22/2023 at 8PM.  Enlarged heterogeneous prostate. Innumerable osseous metastases   consistent with known metastatic prostate malignancy. Mild L1 and L3   vertebral body compression deformities, age indeterminate.  Indeterminate inferior right hepatic lesion measures 1.6 cm, image 49   series 3. Additional 4.4 cm left hepatic lesion is most consistent with a   hemangioma. Abdominal MRI is recommended for characterizationwhen   feasible.        SECONDARY PROCEDURE  Procedure: CT chest wo/w con  Findings and Treatment: Diffuse skeletal metastases.  Nonspecific very small right upper lobe solid nodules.       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 Marked mid/lower rectal wall thickening, hypervascularity, and   extraluminal air extending through the anal sphincter complex down to the   anal verge. Given the absence of recent procedure in this location,   findings are concerning for rectal perforation.  Additional findings concerning for urinary bladder cystitis and ascending   urinary tract infection. No CT evidence of pyelonephritis. No obstructing   ureteral calculus or hydronephrosis.  These above findings were discussed with Dr. Barajas on 12/22/2023 at 8PM.  Enlarged heterogeneous prostate. Innumerable osseous metastases   consistent with known metastatic prostate malignancy. Mild L1 and L3   vertebral body compression deformities, age indeterminate.  Indeterminate inferior right hepatic lesion measures 1.6 cm, image 49   series 3. Additional 4.4 cm left hepatic lesion is most consistent with a   hemangioma. Abdominal MRI is recommended for characterization when feasible.  Follow Up CT:   Interval resolution of previously seen air in thedistal rectal wall   extending along the anal sphincter complex to the anal verge. Persistent signs of proctitis.  No evidence of contrast extravasation from the rectum  into the rectal wall or anal sphincter complex following rectal contrast administration. Note that the rectal tube is still in place during imaging.        SECONDARY PROCEDURE  Procedure: CT chest wo/w con  Findings and Treatment: Diffuse skeletal metastases.  Nonspecific very small right upper lobe solid nodules.

## 2023-12-25 NOTE — H&P ADULT - NSICDXPASTMEDICALHX_GEN_ALL_CORE_FT
PAST MEDICAL HISTORY:  BPH (Benign Prostatic Hypertrophy)     CAD (Coronary Artery Disease)     Coronary Stent in 2005    Depression     Hypercholesterolemia

## 2023-12-25 NOTE — DISCHARGE NOTE PROVIDER - NSDCCPCAREPLAN_GEN_ALL_CORE_FT
PRINCIPAL DISCHARGE DIAGNOSIS  Diagnosis: Abdominal pain  Assessment and Plan of Treatment: You came in for abdominal pain and when doing more imaging and workup we found that you had a hole in your rectum( rectal perforation) we also found that you had a urinary tract infection which we treated with antibiotics.   If you feel febrile, have abdominal pain, burning when you pee, or urinary urgency please seek urgent medical attention.      SECONDARY DISCHARGE DIAGNOSES  Diagnosis: Rectal perforation  Assessment and Plan of Treatment:     Diagnosis: Colitis  Assessment and Plan of Treatment:      PRINCIPAL DISCHARGE DIAGNOSIS  Diagnosis: Abdominal pain  Assessment and Plan of Treatment: You were treated in the hospital for a urinay tract infection (UTI). A urinary tract infection (UTI) is an infection of any part of the urinary tract, which includes the kidneys, ureters, bladder, and urethra.  Symptoms include frequent urination, pain or burning with urination, foul smelling urine, cloudy urine, pain in the lower abdomen, blood in the urine, and fever.   You were treated with an antibiotic called Zosyn. You have been perscribed an antibiotic called Ciprofloxacin that you should continue through 12/30. Please take this antibiotic as perscribed. Do not stop taking the antibiotic even if you start feeling better. It is important to take the full duration of antibiotics.   If you experience any of the follow symptoms, please return to the hospital to be further evaluated for the need of additional treatment: severe back or abdominal pain, fever, inability to keep fluids or medicine down, dizziness/lightheadedness, or a change in mental status.      SECONDARY DISCHARGE DIAGNOSES  Diagnosis: Colitis  Assessment and Plan of Treatment: You were found to have inflammation of the recal and colon that should resolve on its own. You are now having normal bowel movements.   If you experience worsening abdominal pain, nausea, diarrhea, or bleeding, please return to the hospital for further evaluation.

## 2023-12-25 NOTE — PATIENT PROFILE ADULT - FALL HARM RISK - HARM RISK INTERVENTIONS
Assistance with ambulation/Assistance OOB with selected safe patient handling equipment/Communicate Risk of Fall with Harm to all staff/Discuss with provider need for PT consult/Monitor gait and stability/Provide patient with walking aids - walker, cane, crutches/Reinforce activity limits and safety measures with patient and family/Tailored Fall Risk Interventions/Visual Cue: Yellow wristband and red socks/Bed in lowest position, wheels locked, appropriate side rails in place/Call bell, personal items and telephone in reach/Instruct patient to call for assistance before getting out of bed or chair/Non-slip footwear when patient is out of bed/[place] to call system/Physically safe environment - no spills, clutter or unnecessary equipment/Purposeful Proactive Rounding/Room/bathroom lighting operational, light cord in reach Assistance with ambulation/Assistance OOB with selected safe patient handling equipment/Communicate Risk of Fall with Harm to all staff/Discuss with provider need for PT consult/Monitor gait and stability/Provide patient with walking aids - walker, cane, crutches/Reinforce activity limits and safety measures with patient and family/Tailored Fall Risk Interventions/Visual Cue: Yellow wristband and red socks/Bed in lowest position, wheels locked, appropriate side rails in place/Call bell, personal items and telephone in reach/Instruct patient to call for assistance before getting out of bed or chair/Non-slip footwear when patient is out of bed/[place] to call system/Physically safe environment - no spills, clutter or unnecessary equipment/Purposeful Proactive Rounding/Room/bathroom lighting operational, light cord in reach

## 2023-12-25 NOTE — CONSULT NOTE ADULT - ASSESSMENT
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    PLAN:  - imaging reviewed  - low concern for peritonitis via rectal perforation; benign clinical exam and w/o signs of hemodynamic instability  - admit to medicine for IV abx  - possible IR intervention to drain air collection  - NPO/IVF  - Bowel rest w/ IV abx to increase opportunity for perforation healing  - Monitor clinical/abdominal exam  - Discuss re-scan (w/ rectal contrast) once LLQ pain resolves  - GOALS of CARE need to be discussed in this elderly patient w/ metastatic cancer (DNR/DNI? Surgery would only be palliative)  - discussed plan w/ son Mohsen (who is decision maker)  - will follow    Discussed w/ colorectal surgeon, Dr. 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    PLAN:  - imaging reviewed  - low concern for peritonitis via rectal perforation; benign clinical exam and w/o signs of hemodynamic instability  - admit to medicine for IV abx  - possible IR intervention to drain air collection  - NPO/IVF  - Bowel rest w/ IV abx to increase opportunity for perforation healing  - Monitor clinical/abdominal exam  - Discuss re-scan (w/ rectal contrast) once LLQ pain resolves  - GOALS of CARE need to be discussed in this elderly patient w/ metastatic cancer (DNR/DNI? Surgery would only be palliative)  - discussed plan w/ son Mohsen (who is decision maker)  - will follow    Discussed w/ colorectal surgeon, Dr. Elisabeth Hidalgo, PGY2  Red Surgery, [id number]

## 2023-12-25 NOTE — DISCHARGE NOTE PROVIDER - CARE PROVIDER_API CALL
Nilo Barajas  Cardiology  [address], [address]  [place] [address]  Phone: [phone]  Fax: [phone]  Follow Up Time: 2 weeks   Nilo Barajas  Cardiology  [address] [facility] [address]  [place] [address]  Phone: [phone]  Fax: [phone]  Follow Up Time: 2 weeks   Nilo Barajas  Cardiology  [address] [facility], [address]  [place] [address]  Phone: [phone]  Fax: [phone]  Follow Up Time: 2 weeks   Nilo Barajas  Cardiology  [address] [facility], [address]  [place] [address]  Phone: [phone]  Fax: [phone]  Follow Up Time: 2 weeks    Brigitte Rios  Transplant Hepatology  [address] [place] [address]  Phone: [phone]  Fax: [phone]  Established Patient  Follow Up Time: 1 month   Nilo Barajas  Cardiology  [address] [facility], [address]  [place] [address]  Phone: [phone]  Fax: [phone]  Follow Up Time: 2 weeks    Brigitte Rios  Transplant Hepatology  [address] [place] [address]  Phone: [phone]  Fax: [phone]  Established Patient  Follow Up Time: 1 month   Nilo Barajas  Cardiology  [address] [facility], [address]  [place] [address]  Phone: [phone]  Fax: [phone]  Follow Up Time: 2 weeks    Brigitte Rios  Transplant Hepatology  [address] [place] [address]  Phone: [phone]  Fax: [phone]  Established Patient  Follow Up Time: 1 month

## 2023-12-25 NOTE — PROGRESS NOTE ADULT - PROBLEM SELECTOR PLAN 1
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 Marked mid/lower rectal wall thickening, hypervascularity, and extraluminal air extending through the anal sphincter complex down to the anal verge. 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 Marked mid/lower rectal wall thickening, hypervascularity, and extraluminal air extending through the anal sphincter complex down to the anal verge. Given the absence of recent procedure in this location, findings are concerning for rectal perforation."  s/p zosyn in ER  Monitor for fever, worsening white count  serial abdominal exams, serial abdominal imaging as needed  npo/bowel rest for now; ivf + lytes as needed in the mean time  supportive care with analgesics, antiemetics, antipyretics as needed  empirical abx w zosyn for now   -CRS consulted recs appreciated

## 2023-12-25 NOTE — OCCUPATIONAL THERAPY INITIAL EVALUATION ADULT - PERTINENT HX OF CURRENT PROBLEM, REHAB EVAL
84yo M, PMH htn, hld, cad s/p pci w stent, sah, gerd, depression, metastatic prostate ca, p/w abdominal pain; found to have outpatient imaging findings suggestive of proctitis w rectal perforation + ascending uti; sent to er and admitted to medicine for further mgmt.  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 Marked mid/lower rectal wall thickening, hypervascularity, and extraluminal air extending through the anal sphincter complex down to the anal verge. Given the absence of recent procedure in this location, findings are concerning for rectal perforation."  Prostate cancer metastatic to bone. most recent imaging confirms, "Enlarged heterogeneous prostate. Innumerable osseous metastases consistent with known metastatic prostate malignancy. Mild L1 and L3 vertebral body compression deformities, age indeterminate;" additionally, also mentions, "4.4 cm left hepatic lesion is most consistent with a hemangioma." however, also mentions "Indeterminate inferior right hepatic lesion measures 1.6 cm....Abdominal MRI is recommended for characterization when feasible." 82yo M, PMH htn, hld, cad s/p pci w stent, sah, gerd, depression, metastatic prostate ca, p/w abdominal pain; found to have outpatient imaging findings suggestive of proctitis w rectal perforation + ascending uti; sent to er and admitted to medicine for further mgmt.  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 Marked mid/lower rectal wall thickening, hypervascularity, and extraluminal air extending through the anal sphincter complex down to the anal verge. Given the absence of recent procedure in this location, findings are concerning for rectal perforation."  Prostate cancer metastatic to bone. most recent imaging confirms, "Enlarged heterogeneous prostate. Innumerable osseous metastases consistent with known metastatic prostate malignancy. Mild L1 and L3 vertebral body compression deformities, age indeterminate;" additionally, also mentions, "4.4 cm left hepatic lesion is most consistent with a hemangioma." however, also mentions "Indeterminate inferior right hepatic lesion measures 1.6 cm....Abdominal MRI is recommended for characterization when feasible."

## 2023-12-25 NOTE — PHYSICAL THERAPY INITIAL EVALUATION ADULT - PERTINENT HX OF CURRENT PROBLEM, REHAB EVAL
82yo 68kg m w pmh htn, hld, cad s/p pci w stent, sah, gerd, depression, metastatic prostate ca, p/w abdominal pain (x1 week in duration, over lower quadrants and suprapubic region, non radiating, 7-8/10 in intensity; a/w constipation, hematochezia, dysuria; had been seen at [facility] and by outpatient provider; was diagnosed with proctitis and uti and started on po abx with no relief in symptoms; so had ct imaging done of abdo/pelv which showed findings suggestive of proctitis w rectal perforation + ascending uti; sent to er and admitted to medicine for further mgmt. 84yo 68kg m w pmh htn, hld, cad s/p pci w stent, sah, gerd, depression, metastatic prostate ca, p/w abdominal pain (x1 week in duration, over lower quadrants and suprapubic region, non radiating, 7-8/10 in intensity; a/w constipation, hematochezia, dysuria; had been seen at [facility] er and by outpatient provider; was diagnosed with proctitis and uti and started on po abx with no relief in symptoms; so had ct imaging done of abdo/pelv which showed findings suggestive of proctitis w rectal perforation + ascending uti; sent to er and admitted to medicine for further mgmt.

## 2023-12-25 NOTE — PATIENT PROFILE ADULT - SAFE PLACE TO LIVE
Message received from patient with questions concerning FMLA paperwork. Patient asked that I return call to discuss. Call returned to patient. No answer. VM Box was full. Unable to leave message.
no

## 2023-12-25 NOTE — H&P ADULT - PROBLEM SELECTOR PLAN 3
h/o s/p pci w stent  no clinft of acs  monitor for chest pain and ekg changes  cont home statin  hold home asa for now  cards consulted by er

## 2023-12-25 NOTE — H&P ADULT - NSICDXPASTSURGICALHX_GEN_ALL_CORE_FT
PAST SURGICAL HISTORY:  History of Spinal Surgery l/s spine    S/P Manipulation of Deviated Nasal Septum

## 2023-12-25 NOTE — DISCHARGE NOTE PROVIDER - NSDCMRMEDTOKEN_GEN_ALL_CORE_FT
abiraterone 250 mg oral tablet: 4 tab(s) orally once a day  Aspirin EC 81 mg oral delayed release tablet: 1 tab(s) orally once a day  bicalutamide 50 mg oral tablet: 1 tab(s) orally once a day (at bedtime)  Eligard 22.5 mg/3 months subcutaneous injection, extended release: 22.5 milligram(s) subcutaneously  finasteride 5 mg oral tablet: 1 tab(s) orally  FLUoxetine (Eqv-Sarafem) 20 mg oral tablet: 1 tab(s) orally once a day  naproxen 250 mg oral tablet: 1 tab(s) orally every 12 hours as needed for  severe pain  predniSONE 5 mg oral tablet: 1 tab(s) orally 2 times a day  Protonix 40 mg oral delayed release tablet: 1 tab(s) orally once a day  simvastatin 40 mg oral tablet: 1 tab(s) orally once a day (at bedtime)  tamsulosin 0.4 mg oral capsule: 1 cap(s) orally once a day (at bedtime)   Aspirin EC 81 mg oral delayed release tablet: 1 tab(s) orally once a day  ciprofloxacin 500 mg oral tablet: 1 tab(s) orally 2 times a day  finasteride 5 mg oral tablet: 1 tab(s) orally once a day  FLUoxetine (Eqv-Sarafem) 20 mg oral tablet: 1 tab(s) orally once a day  predniSONE 5 mg oral tablet: 1 tab(s) orally 2 times a day  Protonix 40 mg oral delayed release tablet: 1 tab(s) orally once a day  simvastatin 40 mg oral tablet: 1 tab(s) orally once a day (at bedtime)  tamsulosin 0.4 mg oral capsule: 1 cap(s) orally once a day (at bedtime)

## 2023-12-25 NOTE — DISCHARGE NOTE PROVIDER - NSDCCAREPROVSEEN_GEN_ALL_CORE_FT
[facility] Team 1 [facility] Team 1 [facility] Team 1

## 2023-12-25 NOTE — PROGRESS NOTE ADULT - ASSESSMENT
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 Currently low concern for peritonitis via rectal perforation; benign clinical exam and w/o signs of hemodynamic instability.    PLAN:  - NPO/IVF  - Bowel rest w/ IV abx to increase opportunity for perforation healing  - Monitor clinical/abdominal exam  - Discuss re-scan (w/ rectal contrast) once LLQ pain resolves  - GOALS of CARE need to be discussed in this elderly patient w/ metastatic cancer (DNR/DNI? 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 Currently low concern for peritonitis via rectal perforation; benign clinical exam and w/o signs of hemodynamic instability.    PLAN:  - NPO/IVF  - Bowel rest w/ IV abx to increase opportunity for perforation healing  - Monitor clinical/abdominal exam  - Discuss re-scan (w/ rectal contrast) once LLQ pain resolves  - GOALS of CARE need to be discussed in this elderly patient w/ metastatic cancer (DNR/DNI? Diversion surgery would only be palliative)  - Urology recommendations appreciated  - Heme/Onc recommendations appreciated  - Surgery to follow    Red Surgery  [id number]

## 2023-12-25 NOTE — H&P ADULT - NSHPREVIEWOFSYSTEMS_GEN_ALL_CORE
CONSTITUTIONAL: No fever. no weakness  ENMT:  No sinus or throat pain  RESPIRATORY: No cough, wheezing, chills or hemoptysis; No shortness of breath  CARDIOVASCULAR: No chest pain, palpitations, dizziness, or leg swelling  GASTROINTESTINAL: + abdominal , No nausea, vomiting, or hematemesis; No diarrhea or constipation. No melena or hematochezia.  GENITOURINARY: No dysuria or incontinence  NEUROLOGICAL: No headaches, memory loss, loss of strength, numbness, or tremors  SKIN: No rashes,  No hives or eczema  ENDOCRINE: No heat or cold intolerance; No hair loss  MUSCULOSKELETAL: No joint pain or swelling; No muscle, back, or extremity pain  PSYCHIATRIC: No depression, anxiety, mood swings, or difficulty sleeping  HEME/LYMPH: No easy bruising, or bleeding gums; no enlarged LN CONSTITUTIONAL: No fever. no weakness  ENMT:  No sinus or throat pain  RESPIRATORY: No cough, wheezing, chills or hemoptysis; No shortness of breath  CARDIOVASCULAR: No chest pain, palpitations, dizziness, or leg swelling  GASTROINTESTINAL: + abdominal , No nausea, vomiting, or hematemesis; +constipation. No melena, +hematochezia.  GENITOURINARY: +dysuria  NEUROLOGICAL: No headaches, memory loss, loss of strength, numbness, or tremors  SKIN: No rashes,  No hives or eczema  ENDOCRINE: No heat or cold intolerance; No hair loss  MUSCULOSKELETAL: No joint pain or swelling; No muscle, back, or extremity pain  PSYCHIATRIC: No depression, anxiety, mood swings, or difficulty sleeping  HEME/LYMPH: No easy bruising, or bleeding gums; no enlarged LN

## 2023-12-25 NOTE — DISCHARGE NOTE PROVIDER - PROVIDER TOKENS
PROVIDER:[TOKEN:[[id number]],FOLLOWUP:[2 weeks]] PROVIDER:[TOKEN:[[id number]],FOLLOWUP:[2 weeks]],PROVIDER:[TOKEN:[[id number]],FOLLOWUP:[1 month],ESTABLISHEDPATIENT:[T]] PROVIDER:[TOKEN:[[id number]],FOLLOWUP:[2 weeks]],PROVIDER:[TOKEN:[[id number]],FOLLOWUP:[1 month],ESTABLISHEDPATIENT:[T]] PROVIDER:[TOKEN:[[id number]],FOLLOWUP:[2 weeks]],PROVIDER:[TOKEN:[[id number]],FOLLOWUP:[1 month],ESTABLISHEDPATIENT:[T]]

## 2023-12-25 NOTE — PROGRESS NOTE ADULT - ATTENDING COMMENTS
#Rectal perforation.   # acute UTI    - c/w zosyn   - NPO , IVF  - Serial abdominal exams, serial abdominal imaging as needed  - CRS consulted recs appreciated: no intervention rec rescan once abd pain improves   - urine cultures  - once Patient is started on a Diet can resume zytiga (prostate CA)  - c/w GOC

## 2023-12-25 NOTE — PROGRESS NOTE ADULT - ASSESSMENT
82yo 68kg m w pmh htn, hld, cad s/p pci w stent, sah, gerd, depression, metastatic prostate ca, p/w abdominal pain; found to have outpatient imaging findings suggestive of proctitis w rectal perforation + ascending uti; sent to er and admitted to medicine for further mgmt. 84yo 68kg m w pmh htn, hld, cad s/p pci w stent, sah, gerd, depression, metastatic prostate ca, p/w abdominal pain; found to have outpatient imaging findings suggestive of proctitis w rectal perforation + ascending uti; sent to er and admitted to medicine for further mgmt.

## 2023-12-25 NOTE — PHYSICAL THERAPY INITIAL EVALUATION ADULT - PLANNED THERAPY INTERVENTIONS, PT EVAL
stair training: GOAL: Pt will negotiate up/down 6 steps with 1 handrail ascending independently in 2 weeks./balance training/bed mobility training/gait training/strengthening/transfer training

## 2023-12-25 NOTE — OCCUPATIONAL THERAPY INITIAL EVALUATION ADULT - NS ASR FOLLOW COMMAND OT EVAL
[place]/100% of the time/able to follow single-step instructions [place]/100% of the time/able to follow single-step instructions

## 2023-12-25 NOTE — H&P ADULT - PROBLEM SELECTOR PLAN 1
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 Marked mid/lower rectal wall thickening, hypervascularity, and extraluminal air extending through the anal sphincter complex down to the anal verge. 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 Marked mid/lower rectal wall thickening, hypervascularity, and extraluminal air extending through the anal sphincter complex down to the anal verge. Given the absence of recent procedure in this location, findings are concerning for rectal perforation."  s/p zosyn in ER  Monitor for fever, worsening white count  serial abdominal exams, serial abdominal imaging as needed  npo/bowel rest for now; ivf + lytes as needed in the mean time  supportive care with analgesics, antiemetics, antipyretics as needed  empirical abx w zosyn for now   crs consulted by er; pending recs

## 2023-12-25 NOTE — PROGRESS NOTE ADULT - SUBJECTIVE AND OBJECTIVE BOX
***************************************************************  Jn Lira, PGY1  Internal Medicine   Preferred contact via [employer] Teams   ***************************************************************    WHIT MATTHEWS  83y  MRN: [id number]    Patient is a 83y old  Male who presents with a chief complaint of abdominal pain (25 Dec 2023 03:43)      Interval/Overnight Events: no events ON.     Subjective: Pt seen and examined at bedside. Denies fever, CP, SOB, abn pain, N/V, dysuria. Tolerating diet.      MEDICATIONS  (STANDING):  finasteride 5 milliGRAM(s) Oral daily  FLUoxetine 20 milliGRAM(s) Oral daily  lactated ringers. 1000 milliLiter(s) (100 mL/Hr) IV Continuous <Continuous>  naloxone Injectable 0.4 milliGRAM(s) IV Push once  pantoprazole    Tablet 40 milliGRAM(s) Oral before breakfast  piperacillin/tazobactam IVPB.. 3.375 Gram(s) IV Intermittent every 8 hours  polyethylene glycol 3350 17 Gram(s) Oral daily  senna 2 Tablet(s) Oral at bedtime  simvastatin 40 milliGRAM(s) Oral at bedtime  tamsulosin 0.4 milliGRAM(s) Oral at bedtime    MEDICATIONS  (PRN):  acetaminophen     Tablet .. 650 milliGRAM(s) Oral every 6 hours PRN Temp greater or equal to 38C (100.4F), Mild Pain (1 - 3)  aluminum hydroxide/magnesium hydroxide/simethicone Suspension 30 milliLiter(s) Oral every 4 hours PRN Dyspepsia  bisacodyl 5 milliGRAM(s) Oral daily PRN Constipation  melatonin 3 milliGRAM(s) Oral at bedtime PRN Insomnia  ondansetron Injectable 4 milliGRAM(s) IV Push every 8 hours PRN Nausea and/or Vomiting  oxyCODONE    IR 5 milliGRAM(s) Oral every 4 hours PRN Severe Pain (7 - 10)  oxyCODONE    IR 2.5 milliGRAM(s) Oral every 4 hours PRN Moderate Pain (4 - 6)      Objective:    Vitals: Vital Signs Last 24 Hrs  T(C): 36.4 (23 @ 04:15), Max: 36.8 (23 @ 22:25)  T(F): 97.5 (23 @ 04:15), Max: 98.3 (23 @ 22:25)  HR: 51 (23 @ 04:15) (51 - 76)  BP: 113/63 (23 @ 04:15) (87/55 - 127/57)  BP(mean): 76 (23 @ 15:00) (76 - 76)  RR: 18 (23 @ 04:15) (15 - 22)  SpO2: 99% (23 @ 04:15) (95% - 100%)                I&O's Summary      PHYSICAL EXAM:  GENERAL: NAD  HEAD:  Atraumatic, Normocephalic  EYES: EOMI, conjunctiva and sclera clear  CHEST/LUNG: Clear to auscultation bilaterally; No rales, rhonchi, wheezing, or rubs  HEART: Regular rate and rhythm; No murmurs, rubs, or gallops  ABDOMEN: Soft, Nontender, Nondistended;   SKIN: No rashes or lesions  NERVOUS SYSTEM:  Alert & Oriented X3, no focal deficits    LABS:                        11.6   3.83  )-----------( 257      ( 24 Dec 2023 16:05 )             36.4         138  |  105  |  9   ----------------------------<  116<H>  3.4<L>   |  25  |  1.00    Ca    9.6      24 Dec 2023 16:05    TPro  7.1  /  Alb  3.7  /  TBili  0.4  /  DBili  x   /  AST  29  /  ALT  16  /  AlkPhos  187<H>  12-24    CAPILLARY BLOOD GLUCOSE        PT/INR - ( 24 Dec 2023 16:05 )   PT: 10.9 sec;   INR: 1.04 ratio         PTT - ( 24 Dec 2023 16:05 )  PTT:28.4 sec    Urinalysis Basic - ( 24 Dec 2023 16:05 )    Color: Yellow / Appearance: Cloudy / S.023 / pH: x  Gluc: 116 mg/dL / Ketone: Negative mg/dL  / Bili: Negative / Urobili: 1.0 mg/dL   Blood: x / Protein: Trace mg/dL / Nitrite: Positive   Leuk Esterase: Large / RBC: 1 /HPF / WBC 98 /HPF   Sq Epi: x / Non Sq Epi: 0 /HPF / Bacteria: Moderate /HPF          RADIOLOGY & ADDITIONAL TESTS:    Imaging Personally Reviewed:  [x ] YES  [ ] NO    Consultants involved in case:   Consultant(s) Notes Reviewed:  [ x] YES  [ ] NO:   Care Discussed with Consultants/Other Providers [x ] YES  [ ] NO         ***************************************************************  Jn Lira, PGY1  Internal Medicine   Preferred contact via [employer] Teams   ***************************************************************    WHIT MATTHEWS  83y  MRN: [id number]    Patient is a 83y old  Male who presents with a chief complaint of abdominal pain (25 Dec 2023 03:43)      Interval/Overnight Events: no events ON.     Subjective: Pt seen and examined at bedside. Denies fever, CP, SOB, abn pain, N/V, dysuria. Tolerating diet.      MEDICATIONS  (STANDING):  finasteride 5 milliGRAM(s) Oral daily  FLUoxetine 20 milliGRAM(s) Oral daily  lactated ringers. 1000 milliLiter(s) (100 mL/Hr) IV Continuous <Continuous>  naloxone Injectable 0.4 milliGRAM(s) IV Push once  pantoprazole    Tablet 40 milliGRAM(s) Oral before breakfast  piperacillin/tazobactam IVPB.. 3.375 Gram(s) IV Intermittent every 8 hours  polyethylene glycol 3350 17 Gram(s) Oral daily  senna 2 Tablet(s) Oral at bedtime  simvastatin 40 milliGRAM(s) Oral at bedtime  tamsulosin 0.4 milliGRAM(s) Oral at bedtime    MEDICATIONS  (PRN):  acetaminophen     Tablet .. 650 milliGRAM(s) Oral every 6 hours PRN Temp greater or equal to 38C (100.4F), Mild Pain (1 - 3)  aluminum hydroxide/magnesium hydroxide/simethicone Suspension 30 milliLiter(s) Oral every 4 hours PRN Dyspepsia  bisacodyl 5 milliGRAM(s) Oral daily PRN Constipation  melatonin 3 milliGRAM(s) Oral at bedtime PRN Insomnia  ondansetron Injectable 4 milliGRAM(s) IV Push every 8 hours PRN Nausea and/or Vomiting  oxyCODONE    IR 5 milliGRAM(s) Oral every 4 hours PRN Severe Pain (7 - 10)  oxyCODONE    IR 2.5 milliGRAM(s) Oral every 4 hours PRN Moderate Pain (4 - 6)      Objective:    Vitals: Vital Signs Last 24 Hrs  T(C): 36.4 (23 @ 04:15), Max: 36.8 (23 @ 22:25)  T(F): 97.5 (23 @ 04:15), Max: 98.3 (23 @ 22:25)  HR: 51 (23 @ 04:15) (51 - 76)  BP: 113/63 (23 @ 04:15) (87/55 - 127/57)  BP(mean): 76 (23 @ 15:00) (76 - 76)  RR: 18 (23 @ 04:15) (15 - 22)  SpO2: 99% (23 @ 04:15) (95% - 100%)                I&O's Summary      PHYSICAL EXAM:  GENERAL: NAD  HEAD:  Atraumatic, Normocephalic  EYES: EOMI, conjunctiva and sclera clear  CHEST/LUNG: Clear to auscultation bilaterally; No rales, rhonchi, wheezing, or rubs  HEART: Regular rate and rhythm; No murmurs, rubs, or gallops  ABDOMEN: Soft, Nontender, Nondistended;   SKIN: No rashes or lesions  NERVOUS SYSTEM:  Alert & Oriented X3, no focal deficits    LABS:                        11.6   3.83  )-----------( 257      ( 24 Dec 2023 16:05 )             36.4         138  |  105  |  9   ----------------------------<  116<H>  3.4<L>   |  25  |  1.00    Ca    9.6      24 Dec 2023 16:05    TPro  7.1  /  Alb  3.7  /  TBili  0.4  /  DBili  x   /  AST  29  /  ALT  16  /  AlkPhos  187<H>  12-24    CAPILLARY BLOOD GLUCOSE        PT/INR - ( 24 Dec 2023 16:05 )   PT: 10.9 sec;   INR: 1.04 ratio         PTT - ( 24 Dec 2023 16:05 )  PTT:28.4 sec    Urinalysis Basic - ( 24 Dec 2023 16:05 )    Color: Yellow / Appearance: Cloudy / S.023 / pH: x  Gluc: 116 mg/dL / Ketone: Negative mg/dL  / Bili: Negative / Urobili: 1.0 mg/dL   Blood: x / Protein: Trace mg/dL / Nitrite: Positive   Leuk Esterase: Large / RBC: 1 /HPF / WBC 98 /HPF   Sq Epi: x / Non Sq Epi: 0 /HPF / Bacteria: Moderate /HPF          RADIOLOGY & ADDITIONAL TESTS:    Imaging Personally Reviewed:  [x ] YES  [ ] NO    Consultants involved in case:   Consultant(s) Notes Reviewed:  [ x] YES  [ ] NO:   Care Discussed with Consultants/Other Providers [x ] YES  [ ] NO         ***************************************************************  Jn Lira, PGY1  Internal Medicine   Preferred contact via [employer] Teams   ***************************************************************    WHIT MATTHEWS  83y  MRN: [id number]    Patient is a 83y old  Male who presents with a chief complaint of abdominal pain (25 Dec 2023 03:43)      Interval/Overnight Events: no events ON.     Subjective: Pt seen and examined at bedside. Pt pleasant and resting calmly.     MEDICATIONS  (STANDING):  finasteride 5 milliGRAM(s) Oral daily  FLUoxetine 20 milliGRAM(s) Oral daily  lactated ringers. 1000 milliLiter(s) (100 mL/Hr) IV Continuous <Continuous>  naloxone Injectable 0.4 milliGRAM(s) IV Push once  pantoprazole    Tablet 40 milliGRAM(s) Oral before breakfast  piperacillin/tazobactam IVPB.. 3.375 Gram(s) IV Intermittent every 8 hours  polyethylene glycol 3350 17 Gram(s) Oral daily  senna 2 Tablet(s) Oral at bedtime  simvastatin 40 milliGRAM(s) Oral at bedtime  tamsulosin 0.4 milliGRAM(s) Oral at bedtime    MEDICATIONS  (PRN):  acetaminophen     Tablet .. 650 milliGRAM(s) Oral every 6 hours PRN Temp greater or equal to 38C (100.4F), Mild Pain (1 - 3)  aluminum hydroxide/magnesium hydroxide/simethicone Suspension 30 milliLiter(s) Oral every 4 hours PRN Dyspepsia  bisacodyl 5 milliGRAM(s) Oral daily PRN Constipation  melatonin 3 milliGRAM(s) Oral at bedtime PRN Insomnia  ondansetron Injectable 4 milliGRAM(s) IV Push every 8 hours PRN Nausea and/or Vomiting  oxyCODONE    IR 5 milliGRAM(s) Oral every 4 hours PRN Severe Pain (7 - 10)  oxyCODONE    IR 2.5 milliGRAM(s) Oral every 4 hours PRN Moderate Pain (4 - 6)      Objective:    Vitals: Vital Signs Last 24 Hrs  T(C): 36.4 (23 @ 04:15), Max: 36.8 (23 @ 22:25)  T(F): 97.5 (23 @ 04:15), Max: 98.3 (23 @ 22:25)  HR: 51 (23 @ 04:15) (51 - 76)  BP: 113/63 (23 @ 04:15) (87/55 - 127/57)  BP(mean): 76 (23 @ 15:00) (76 - 76)  RR: 18 (23 @ 04:15) (15 - 22)  SpO2: 99% (23 @ 04:15) (95% - 100%)                I&O's Summary      PHYSICAL EXAM:  GENERAL: NAD  HEAD:  Atraumatic, Normocephalic  EYES: EOMI, conjunctiva and sclera clear  CHEST/LUNG: Clear to auscultation bilaterally; No rales, rhonchi, wheezing, or rubs  HEART: Regular rate and rhythm; No murmurs, rubs, or gallops  ABDOMEN: Soft, Nontender, Nondistended;   SKIN: No rashes or lesions  NERVOUS SYSTEM:  Alert & Oriented X3, no focal deficits    LABS:                        11.6   3.83  )-----------( 257      ( 24 Dec 2023 16:05 )             36.4     12-    138  |  105  |  9   ----------------------------<  116<H>  3.4<L>   |  25  |  1.00    Ca    9.6      24 Dec 2023 16:05    TPro  7.1  /  Alb  3.7  /  TBili  0.4  /  DBili  x   /  AST  29  /  ALT  16  /  AlkPhos  187<H>  12-24    CAPILLARY BLOOD GLUCOSE        PT/INR - ( 24 Dec 2023 16:05 )   PT: 10.9 sec;   INR: 1.04 ratio         PTT - ( 24 Dec 2023 16:05 )  PTT:28.4 sec    Urinalysis Basic - ( 24 Dec 2023 16:05 )    Color: Yellow / Appearance: Cloudy / S.023 / pH: x  Gluc: 116 mg/dL / Ketone: Negative mg/dL  / Bili: Negative / Urobili: 1.0 mg/dL   Blood: x / Protein: Trace mg/dL / Nitrite: Positive   Leuk Esterase: Large / RBC: 1 /HPF / WBC 98 /HPF   Sq Epi: x / Non Sq Epi: 0 /HPF / Bacteria: Moderate /HPF          RADIOLOGY & ADDITIONAL TESTS:    Imaging Personally Reviewed:  [x ] YES  [ ] NO    Consultants involved in case:   Consultant(s) Notes Reviewed:  [ x] YES  [ ] NO:   Care Discussed with Consultants/Other Providers [x ] YES  [ ] NO         ***************************************************************  Jn Lira, PGY1  Internal Medicine   Preferred contact via [employer] Teams   ***************************************************************    WHIT MATTHEWS  83y  MRN: [id number]    Patient is a 83y old  Male who presents with a chief complaint of abdominal pain (25 Dec 2023 03:43)      Interval/Overnight Events: no events ON.     Subjective: Pt seen and examined at bedside. Pt pleasant and resting calmly.     MEDICATIONS  (STANDING):  finasteride 5 milliGRAM(s) Oral daily  FLUoxetine 20 milliGRAM(s) Oral daily  lactated ringers. 1000 milliLiter(s) (100 mL/Hr) IV Continuous <Continuous>  naloxone Injectable 0.4 milliGRAM(s) IV Push once  pantoprazole    Tablet 40 milliGRAM(s) Oral before breakfast  piperacillin/tazobactam IVPB.. 3.375 Gram(s) IV Intermittent every 8 hours  polyethylene glycol 3350 17 Gram(s) Oral daily  senna 2 Tablet(s) Oral at bedtime  simvastatin 40 milliGRAM(s) Oral at bedtime  tamsulosin 0.4 milliGRAM(s) Oral at bedtime    MEDICATIONS  (PRN):  acetaminophen     Tablet .. 650 milliGRAM(s) Oral every 6 hours PRN Temp greater or equal to 38C (100.4F), Mild Pain (1 - 3)  aluminum hydroxide/magnesium hydroxide/simethicone Suspension 30 milliLiter(s) Oral every 4 hours PRN Dyspepsia  bisacodyl 5 milliGRAM(s) Oral daily PRN Constipation  melatonin 3 milliGRAM(s) Oral at bedtime PRN Insomnia  ondansetron Injectable 4 milliGRAM(s) IV Push every 8 hours PRN Nausea and/or Vomiting  oxyCODONE    IR 5 milliGRAM(s) Oral every 4 hours PRN Severe Pain (7 - 10)  oxyCODONE    IR 2.5 milliGRAM(s) Oral every 4 hours PRN Moderate Pain (4 - 6)      Objective:    Vitals: Vital Signs Last 24 Hrs  T(C): 36.4 (23 @ 04:15), Max: 36.8 (23 @ 22:25)  T(F): 97.5 (23 @ 04:15), Max: 98.3 (23 @ 22:25)  HR: 51 (23 @ 04:15) (51 - 76)  BP: 113/63 (23 @ 04:15) (87/55 - 127/57)  BP(mean): 76 (23 @ 15:00) (76 - 76)  RR: 18 (23 @ 04:15) (15 - 22)  SpO2: 99% (23 @ 04:15) (95% - 100%)                I&O's Summary      PHYSICAL EXAM:  GENERAL: NAD  HEAD:  Atraumatic, Normocephalic  EYES: EOMI, conjunctiva and sclera clear  CHEST/LUNG: Clear to auscultation bilaterally; No rales, rhonchi, wheezing, or rubs  HEART: Regular rate and rhythm; No murmurs, rubs, or gallops  ABDOMEN: Soft, Nontender, Nondistended;   SKIN: No rashes or lesions  NERVOUS SYSTEM:  Alert & Oriented X3, no focal deficits    LABS:                        11.6   3.83  )-----------( 257      ( 24 Dec 2023 16:05 )             36.4     12-    138  |  105  |  9   ----------------------------<  116<H>  3.4<L>   |  25  |  1.00    Ca    9.6      24 Dec 2023 16:05    TPro  7.1  /  Alb  3.7  /  TBili  0.4  /  DBili  x   /  AST  29  /  ALT  16  /  AlkPhos  187<H>  12-24    CAPILLARY BLOOD GLUCOSE        PT/INR - ( 24 Dec 2023 16:05 )   PT: 10.9 sec;   INR: 1.04 ratio         PTT - ( 24 Dec 2023 16:05 )  PTT:28.4 sec    Urinalysis Basic - ( 24 Dec 2023 16:05 )    Color: Yellow / Appearance: Cloudy / S.023 / pH: x  Gluc: 116 mg/dL / Ketone: Negative mg/dL  / Bili: Negative / Urobili: 1.0 mg/dL   Blood: x / Protein: Trace mg/dL / Nitrite: Positive   Leuk Esterase: Large / RBC: 1 /HPF / WBC 98 /HPF   Sq Epi: x / Non Sq Epi: 0 /HPF / Bacteria: Moderate /HPF          RADIOLOGY & ADDITIONAL TESTS:    Imaging Personally Reviewed:  [x ] YES  [ ] NO    Consultants involved in case:   Consultant(s) Notes Reviewed:  [ x] YES  [ ] NO:   Care Discussed with Consultants/Other Providers [x ] YES  [ ] NO

## 2023-12-25 NOTE — CONSULT NOTE ADULT - ATTENDING COMMENTS
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   IV abx for now

## 2023-12-25 NOTE — H&P ADULT - NSHPPHYSICALEXAM_GEN_ALL_CORE
T(C): 36.7 (12-25-23 @ 00:37), Max: 36.8 (12-24-23 @ 22:25)  HR: 76 (12-25-23 @ 00:37) (58 - 76)  BP: 104/71 (12-25-23 @ 00:37) (87/55 - 127/57)  RR: 18 (12-25-23 @ 00:37) (15 - 22)  SpO2: 97% (12-25-23 @ 00:37) (95% - 100%)  GENERAL: NAD, lying in bed    EYES: EOMI, PERRLA; conjunctiva and sclera clear  ENMT: Moist oral mucosa, no pharyngeal injection or exudates   NECK: Supple, no palpable masses; no JVD  RESPIRATORY: Normal respiratory effort; lungs are clear to auscultation bilaterally  CARDIOVASCULAR: Regular rate and rhythm, normal S1 and S2, no murmur/rub/gallop; No lower extremity edema; Peripheral pulses are 2+ bilaterally  ABDOMEN: ttp+, normoactive bowel sounds, no rebound/guarding; browne in place  MUSCULOSKELETAL:  no joint swelling or tenderness to palpation  PSYCH: A+O to person, place, and time; affect appropriate  NEUROLOGY: CN 2-12 are intact and symmetric; no gross motor or sensory deficits   SKIN: No rashes; no palpable lesions

## 2023-12-25 NOTE — PROGRESS NOTE ADULT - SUBJECTIVE AND OBJECTIVE BOX
RED TEAM SURGERY DAILY PROGRESS NOTE    SUBJECTIVE: No acute events overnight. Patient seen and examined this am on rounds. Denies any abdominal pain. +/- for flatus and BM. Denies any nausea or vomiting.     OBJECTIVE:  Vital Signs Last 24 Hrs  T(C): 36.4 (25 Dec 2023 04:15), Max: 36.8 (24 Dec 2023 22:25)  T(F): 97.5 (25 Dec 2023 04:15), Max: 98.3 (24 Dec 2023 22:25)  HR: 51 (25 Dec 2023 04:15) (51 - 76)  BP: 113/63 (25 Dec 2023 04:15) (87/55 - 127/57)  BP(mean): 76 (24 Dec 2023 15:00) (76 - 76)  RR: 18 (25 Dec 2023 04:15) (15 - 22)  SpO2: 99% (25 Dec 2023 04:15) (95% - 100%)    Parameters below as of 25 Dec 2023 04:15  Patient On (Oxygen Delivery Method): room air    Daily Height in cm: 170.18 (25 Dec 2023 04:15)      STANDING  chlorhexidine 4% Liquid 1 Application(s) Topical daily  finasteride 5 milliGRAM(s) Oral daily  FLUoxetine 20 milliGRAM(s) Oral daily  influenza  Vaccine (HIGH DOSE) 0.7 milliLiter(s) IntraMuscular once  lactated ringers. 1000 milliLiter(s) (100 mL/Hr) IV Continuous <Continuous>  naloxone Injectable 0.4 milliGRAM(s) IV Push once  pantoprazole    Tablet 40 milliGRAM(s) Oral before breakfast  piperacillin/tazobactam IVPB.. 3.375 Gram(s) IV Intermittent every 8 hours  polyethylene glycol 3350 17 Gram(s) Oral daily  potassium chloride    Tablet ER 40 milliEquivalent(s) Oral once  senna 2 Tablet(s) Oral at bedtime  simvastatin 40 milliGRAM(s) Oral at bedtime  tamsulosin 0.4 milliGRAM(s) Oral at bedtime    PRN  acetaminophen     Tablet .. 650 milliGRAM(s) Oral every 6 hours PRN Temp greater or equal to 38C (100.4F), Mild Pain (1 - 3)  aluminum hydroxide/magnesium hydroxide/simethicone Suspension 30 milliLiter(s) Oral every 4 hours PRN Dyspepsia  bisacodyl 5 milliGRAM(s) Oral daily PRN Constipation  melatonin 3 milliGRAM(s) Oral at bedtime PRN Insomnia  ondansetron Injectable 4 milliGRAM(s) IV Push every 8 hours PRN Nausea and/or Vomiting  oxyCODONE    IR 5 milliGRAM(s) Oral every 4 hours PRN Severe Pain (7 - 10)  oxyCODONE    IR 2.5 milliGRAM(s) Oral every 4 hours PRN Moderate Pain (4 - 6)      Labs:  142  |  108  |  7  ----------------------------<  82    (12-25)  3.3<L>   |  24  |  1.00          Ca    8.9      12-25  Mg    2.2  Phos  3.4          Urinalysis Basic - ( 25 Dec 2023 07:20 )    Color: x / Appearance: x / SG: x / pH: x  Gluc: 82 mg/dL / Ketone: x  / Bili: x / Urobili: x   Blood: x / Protein: x / Nitrite: x   Leuk Esterase: x / RBC: x / WBC x   Sq Epi: x / Non Sq Epi: x / Bacteria: x    Physical Exam:  General: well developed, well nourished, NAD  Cardiovascular: appears well perfused   Respiratory: respirations non labored  Gastrointestinal: soft, nondistended, tender in the LLQ and suprapubic area, no rebound or guarding  Extremities: FROM, warm  Neurological: A+Ox3

## 2023-12-25 NOTE — PROVIDER CONTACT NOTE (OTHER) - ACTION/TREATMENT ORDERED:
MD said to leave the browne in for now as patient has prostate cancer and it needs to be seen by urology.

## 2023-12-25 NOTE — DISCHARGE NOTE PROVIDER - ATTENDING DISCHARGE PHYSICAL EXAMINATION:
PHYSICAL EXAM:  GENERAL: NAD  HEAD:  Atraumatic, Normocephalic  EYES: EOMI, conjunctiva and sclera clear  CHEST/LUNG: Clear to auscultation bilaterally; No rales, rhonchi, wheezing, or rubs  HEART: Regular rate and rhythm; No murmurs, rubs, or gallops  ABDOMEN: Soft, Nontender, Nondistended;   SKIN: No rashes or lesions  NERVOUS SYSTEM:  Alert & Oriented X3, no focal deficits

## 2023-12-25 NOTE — DISCHARGE NOTE PROVIDER - NSDCFUSCHEDAPPT_GEN_ALL_CORE_FT
[facility]  UROLOGY [address] [place] [address] R  Scheduled Appointment: 01/16/2024    Galo Troy  [facility]  UROLOGY [address] [place] [address] R  Scheduled Appointment: 01/16/2024    Galo Troy  [facility]  UROLOGY [address] [place] [address] R  Scheduled Appointment: 01/23/2024     [facility]  UROLOGY [address] [place] [address] R  Scheduled Appointment: 01/16/2024    Galo Troy  [facility]  UROLOGY [address] [place] [address] R  Scheduled Appointment: 01/16/2024    Galo Troy  [facility]  UROLOGY [address] [place] [address] R  Scheduled Appointment: 01/23/2024     [facility]  UROLOGY [address] [place] [address] R  Scheduled Appointment: 01/16/2024    Galo Troy  [facility]  UROLOGY [address] [place] [address] R  Scheduled Appointment: 01/16/2024    Galo Troy  [facility]  UROLOGY [address] [place] [address] R  Scheduled Appointment: 01/23/2024

## 2023-12-25 NOTE — DISCHARGE NOTE PROVIDER - HOSPITAL COURSE
Discharge Summary     Admit Date: 12-24-23  Discharge Date:    Admission diagnoses: Noninfectious gastroenteritis  Discharge diagnoses:     Hospital Course:   For full details, please see H&P, progress notes, consult notes and ancillary notes. Briefly, WHIT MATTHEWS is a 83y Male PMH of  HTN, HLD, CAD s/p PCI, SAH, GERD, depression, and metastatic prostate ca, p/w abdominal pain presenting with abdominal pain found to have rectal perforation and ascending UTI.     On day of discharge, patient is clinically stable with no new exam findings or acute symptoms compared to prior. The patient was seen by the attending physician on the date of discharge and deemed stable and acceptable for discharge. The patient's chronic medical conditions were treated accordingly per the patient's home medication regimen. The patient's medication reconciliation (with changes made to chronic medications), follow up appointments, discharge orders, instructions, and significant lab and diagnostic studies are as noted.     Discharge follow up action items:     1. Follow up with PCP in 1-2 weeks.   2. Follow up labs, path, & imaging ***  3. Medication changes ***  4. On hold medications ***    Patient's ordered code status: ***    Patient disposition: ***     Discharge Summary     Admit Date: 12-24-23  Discharge Date:    Admission diagnoses: Noninfectious gastroenteritis  Discharge diagnoses:     Hospital Course:   For full details, please see H&P, progress notes, consult notes and ancillary notes. Briefly, WHIT MATTHEWS is a 83y Male PMH of  HTN, HLD, CAD s/p PCI, SAH, GERD, depression, and metastatic prostate ca, p/w abdominal pain presenting with abdominal pain found to have rectal perforation and ascending UTI.     On day of discharge, patient is clinically stable with no new exam findings or acute symptoms compared to prior. The patient was seen by the attending physician on the date of discharge and deemed stable and acceptable for discharge. The patient's chronic medical conditions were treated accordingly per the patient's home medication regimen. The patient's medication reconciliation (with changes made to chronic medications), follow up appointments, discharge orders, instructions, and significant lab and diagnostic studies are as noted.     Discharge follow up action items:     1. Follow up with PCP in 1-2 weeks.   2. Follow up labs, path, & imaging   3. Medication changes: Cipro   4. On hold medications: Holding Zytiga, casodex, eligard injection iso active infection.     Patient's ordered code status: Full Code    Patient disposition: Home with Home Care     Discharge Summary     Admit Date: 12-24-23  Discharge Date:    Admission diagnoses: Noninfectious gastroenteritis  Discharge diagnoses:     Hospital Course:   For full details, please see H&P, progress notes, consult notes and ancillary notes. Briefly, WHIT MATTHEWS is a 83y Male PMH of  HTN, HLD, CAD s/p PCI, SAH, GERD, depression, and metastatic prostate ca, p/w abdominal pain presenting with abdominal pain found to have ascending UTI which was treated in the hospital with zosyn and the patient was discharged with Ciprofloxacin. While here the patient had an CT with questionable rectal perforation, this scan was repeated and the patient was found to not have a rectal perforation.     On day of discharge, patient is clinically stable with no new exam findings or acute symptoms compared to prior. The patient was seen by the attending physician on the date of discharge and deemed stable and acceptable for discharge. The patient's chronic medical conditions were treated accordingly per the patient's home medication regimen. The patient's medication reconciliation (with changes made to chronic medications), follow up appointments, discharge orders, instructions, and significant lab and diagnostic studies are as noted.     Discharge follow up action items:     1. Follow up with PCP in 1-2 weeks.   2. Follow up labs, path, & imaging   3. Medication changes: Ciprofloxacin 500 BID for 3 days to complete 3 day course.   4. On hold medications: Bicalutamide, Eligard, Abiraterone iso active infection     Patient's ordered code status: Full Code    Patient disposition: Home with Home Care     Discharge Summary     Admit Date: 12-24-23  Discharge Date:    Admission diagnoses: Noninfectious gastroenteritis  Discharge diagnoses:     Hospital Course:   For full details, please see H&P, progress notes, consult notes and ancillary notes. Briefly, WHIT MATTHEWS is a 83y Male PMH of  HTN, HLD, CAD s/p PCI, SAH, GERD, depression, and metastatic prostate ca, p/w abdominal pain presenting with abdominal pain found to have ascending UTI which was treated in the hospital with zosyn and the patient was discharged with Ciprofloxacin. While here the patient had an CT with questionable rectal perforation, this scan was repeated and the patient was found to not have a rectal perforation.     On day of discharge, patient is clinically stable with no new exam findings or acute symptoms compared to prior. The patient was seen by the attending physician on the date of discharge and deemed stable and acceptable for discharge. The patient's chronic medical conditions were treated accordingly per the patient's home medication regimen. The patient's medication reconciliation (with changes made to chronic medications), follow up appointments, discharge orders, instructions, and significant lab and diagnostic studies are as noted.     Discharge follow up action items:     1. Follow up with PCP in 1-2 weeks.   2. Follow up labs, path, & imaging   3. Medication changes: Ciprofloxacin 500 BID for 3 days to complete 3 day course. Side effects dicussed with patient   4. On hold medications: Bicalutamide, Eligard, Abiraterone iso active infection     Patient's ordered code status: Full Code    Patient disposition: Home with Home Care    Discharge Dx: Question rectal perforation  UTI

## 2023-12-25 NOTE — PROGRESS NOTE ADULT - PROBLEM SELECTOR PLAN 2
ua shows active sediment w large LE, pyuria, nitrites, bacteriuria; suggestive of uti  imaging shows "urinary bladder cystitis and ascending urinary tract infection. No CT evidence of pyelonephritis. No obstructing ureteral calculus or hydronephrosis."  browne in place  s/p zosyn in er  follow up urine cultures  empirical abx w zosyn for now; deescalate based on final urine c+s

## 2023-12-25 NOTE — DISCHARGE NOTE PROVIDER - DETAILS OF MALNUTRITION DIAGNOSIS/DIAGNOSES
This patient has been assessed with a concern for Malnutrition and was treated during this hospitalization for the following Nutrition diagnosis/diagnoses:     -  12/27/2023: Severe protein-calorie malnutrition   -  12/27/2023: Underweight (BMI < 19)

## 2023-12-25 NOTE — PROGRESS NOTE ADULT - SUBJECTIVE AND OBJECTIVE BOX
{\rtf1\titane59131\ansi\fnpcgnn5154\ftnbj\uc1\deff0  {\fonttbl{\f0 \fnil Segoe UI;}{\f1 \fnil \fcharset0 Segoe UI;}{\f2 \fnil Times New Anatoly;}}  {\colortbl ;\znw388\aswzk838\fzuz884 ;\red0\green0\blue0 ;\red0\green0\vywq655 ;\red0\green0\blue0 ;}  {\stylesheet{\f0\fs20 Normal;}{\cs1 Default Paragraph Font;}{\cs2\f0\fs16 Line Number;}{\cs3\f2\fs24\ul\cf3 Hyperlink;}}  {\*\revtbl{Unknown;}}  \egznsr30922\atjarx69163\ktrvk7284\ezjyb4285\psdqk9540\nwxbr9727\ylprxaw533\mkxaarq797\nogrowautofit\hbwqbd065\formshade\nofeaturethrottle1\dntblnsbdb\fet4\aendnotes\aftnnrlc\pgbrdrhead\pgbrdrfoot  \sectd\dralgr61964\wztovv42078\guttersxn0\osjsexom2406\nhhblarf4944\fnmymrze8706\dyvlixcm7059\lrsmzfk516\ukbnijp489\sbkpage\pgncont\pgndec  \plain\plain\f0\fs24\pard\plain\f0\fs24\plain\f0\fs20\rhkl6974\hich\f0\dbch\f0\loch\f0\fs20 DATE OF SERVICE: 12-25-23 @ 13:02\par  \par  Patient is a 83y old  Male who presents with a chief complaint of abdominal pain (25 Dec 2023 10:09)\par  \par  \par  INTERVAL HISTORY: no complaints \par  \par  REVIEW OF SYSTEMS:\par  CONSTITUTIONAL: No weakness\par  EYES/ENT: No visual changes;  No throat pain \par  NECK: No pain or stiffness\par  RESPIRATORY: No cough, wheezing; No shortness of breath\par  CARDIOVASCULAR: No chest pain or palpitations\par  GASTROINTESTINAL: No abdominal  pain. No nausea, vomiting, or hematemesis\par  GENITOURINARY: No dysuria, frequency or hematuria\par  NEUROLOGICAL: No stroke like symptoms\par  SKIN: No rashes\par  \par  \par  MEDICATIONS:\par  \par  \par  \par  PHYSICAL EXAM:\par  T(C): 36.6 (12-25-23 @ 12:30), Max: 36.8 (12-24-23 @ 22:25)\par  HR: 71 (12-25-23 @ 12:30) (51 - 76)\par  BP: 115/60 (12-25-23 @ 12:30) (87/55 - 128/63)\par  RR: 18 (12-25-23 @ 12:30) (15 - 22)\par  SpO2: 98% (12-25-23 @ 12:30) (95% - 100%)\par  Wt(kg): --\par  I&O's Summary\par  \par  25 Dec 2023 07:01  -  25 Dec 2023 13:02\par  --------------------------------------------------------\par  IN: 0 mL / OUT: 300 mL / NET: -300 mL\par  \par  \par  Height (cm): 170.2 (12-25 @ 04:15)\par  Weight (kg): 52.8 (12-25 @ 04:15)\par  BMI (kg/m2): 18.2 (12-25 @ 04:15)\par  BSA (m2): 1.61 (12-25 @ 04:15)\par  \par  Appearance: In no distress\tab\par  HEENT:    PERRL, EOMI\tab\par  Cardiovascular:  S1 S2, No JVD\par  Respiratory: Lungs clear to auscultation\tab\par  Gastrointestinal:  Soft, Non-tender, + BS\tab\par  Vascularature:  No edema of LE\par  Psychiatric: Appropriate affect \par  Neuro: no acute focal deficits \par  \par  \par  \par           \par             11.0 \par  4.09  )-----------( 254      ( 25 Dec 2023 07:20 )\par             34.1 \par  \par  12-25\par  \par  142  |  108  |  7 \par  ----------------------------<  82\par  3.3<L>   |  24  |  1.00\par  \par  Ca    8.9      25 Dec 2023 07:20\par  Phos  3.4     12-25\par  Mg     2.2     12-25\par  \par  TPro  6.0  /  Alb  3.2<L>  /  TBili  0.3  /  DBili  x   /  AST  24  /  ALT  12  /  AlkPhos  160<H>  12-25\par  \par  \par  \par  Labs personally reviewed\par  \par  \par  ASSESSMENT/PLAN: \tab\par  \par  \ql\plain\f0\fs24\plain\f0\fs20\jkcp0118\hich\f0\dbch\f0\loch\f0\fs20 84yo 68kg m w pmh htn, hld, cad s/p pci w stent, sah, gerd, depression, metastatic prostate ca, p/w abdominal pain; found to have outpatient imaging findings suggestive of proctitis   w rectal perforation + ascending uti; sent to er and admitted to medicine for further mgmt.\par  \par  \par  \plain\f1\fs20\tqbd7078\hich\f1\dbch\f1\loch\f1\cf2\fs20\b\ul{\field{\*\fldinst HYPERLINK [id number] }{\fldrslt Problem/Plan - 1:}}\plain\f0\fs20\wzer8754\hich\f0\dbch\f0\loch\f0\fs20\ql\par  \'b7  {\*\bkmkstart xj28780919311}{\*\bkmkend rj17322613465}Problem: {\*\bkmkstart cj25616717222}{\*\bkmkend vk95528460320}Rectal perforation. 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 Marked mid/lower rectal wall thickening, hypervascularity, and extraluminal air extending through the anal sphincter complex down to the anal verge. Given the   absence of recent procedure in this location, findings are concerning for rectal perforation."\par  s/p zosyn in ER\par  Monitor for fever, worsening white count\par  serial abdominal exams, serial abdominal imaging as needed\par  npo/bowel rest for now; ivf + lytes as needed in the mean time\par  supportive care with analgesics, antiemetics, antipyretics as needed\par  empirical abx w zosyn for now \par  -CRS consulted recs appreciated.\plain\f1\fs20\luqr8851\hich\f1\dbch\f1\loch\f1\cf2\fs20\strike\plain\f0\fs20\anlv0509\hich\f0\dbch\f0\loch\f0\fs20\par  \par  \plain\f1\fs20\nwns8393\hich\f1\dbch\f1\loch\f1\cf2\fs20\b\ul{\field{\*\fldinst HYPERLINK [id number] }{\fldrslt Problem/Plan - 2:}}\plain\f0\fs20\zmww8816\hich\f0\dbch\f0\loch\f0\fs20\ql\par  \'b7  {\*\bkmkstart pm47538986449}{\*\bkmkend cs81074569170}Problem: {\*\bkmkstart or95662398507}{\*\bkmkend uv91746618911}Acute UTI. \par  \'b7  {\*\bkmkstart pl92187828877}{\*\bkmkend vz97799305054}Plan: {\*\bkmkstart yk16914558499}{\*\bkmkend mq24614162152}ua shows active sediment w large LE, pyuria, nitrites, bacteriuria; suggestive of uti\par  imaging shows "urinary bladder cystitis and ascending urinary tract infection. No CT evidence of pyelonephritis. No obstructing ureteral calculus or hydronephrosis."\par  browne in place\par  s/p zosyn in er\par   \par  \par  \plain\f1\fs20\ljgm3223\hich\f1\dbch\f1\loch\f1\cf2\fs20\b\ul{\field{\*\fldinst HYPERLINK [id number] }{\fldrslt Problem/Plan - 3:}}\plain\f0\fs20\cotf3377\hich\f0\dbch\f0\loch\f0\fs20\ql\par  \'b7  {\*\bkmkstart lv95649978105}{\*\bkmkend ee48960550756}Problem: {\*\bkmkstart xz99610412374}{\*\bkmkend nd57260714022}CAD (coronary artery disease). \par  \'b7  {\*\bkmkstart fs62519526510}{\*\bkmkend ju54059013941}Plan: {\*\bkmkstart oc00545131053}{\*\bkmkend na15079280092}s/p PCI RCA  2011 \par  c/w asa if ok with CRS\par  \par  \plain\f1\fs20\kksd3422\hich\f1\dbch\f1\loch\f1\cf2\fs20\b\ul{\field{\*\fldinst HYPERLINK [id number] }{\fldrslt Problem/Plan - 4:}}\plain\f0\fs20\qtfh3087\hich\f0\dbch\f0\loch\f0\fs20\ql\par  \'b7  {\*\bkmkstart it47132287846}{\*\bkmkend mo22407502286}Problem: {\*\bkmkstart lu67944706569}{\*\bkmkend xp27254309158}HLD (hyperlipidemia). \par  \'b7  {\*\bkmkstart wv91421361412}{\*\bkmkend lj07295009153}Plan: c/w {\*\bkmkstart ug15227617549}{\*\bkmkend yo65513613487}simvastatin.\par  \pard\plain\f0\fs24\plain\f0\fs20\syyy8137\hich\f0\dbch\f0\loch\f0\fs20\par  \par  \par  Jayla Cuadra PA-C\par  Rashawn Galan DO FACC\par  Cardiovascular Medicine\par  [address] [place] [address]\par  Office: [phone]\par  Available via call/text on [employer] Teams \par  }   {\rtf1\kzecgz17606\ansi\sakykkm2907\ftnbj\uc1\deff0  {\fonttbl{\f0 \fnil Segoe UI;}{\f1 \fnil \fcharset0 Segoe UI;}{\f2 \fnil Times New Anatoly;}}  {\colortbl ;\cgh722\vgdwf716\djcp302 ;\red0\green0\blue0 ;\red0\green0\poby890 ;\red0\green0\blue0 ;}  {\stylesheet{\f0\fs20 Normal;}{\cs1 Default Paragraph Font;}{\cs2\f0\fs16 Line Number;}{\cs3\f2\fs24\ul\cf3 Hyperlink;}}  {\*\revtbl{Unknown;}}  \mqnolu41115\kkcjtf00179\cpguz3194\gsdtm7736\uxzdf6799\dlgzk7365\iodoymz634\oemyrkv483\nogrowautofit\wevmuc155\formshade\nofeaturethrottle1\dntblnsbdb\fet4\aendnotes\aftnnrlc\pgbrdrhead\pgbrdrfoot  \sectd\ftback83792\mylmbc18721\guttersxn0\tqnspvze9822\kpaldyad6116\qrxspefz2655\dgeeaypu6332\bkzeqji089\rgwmhop930\sbkpage\pgncont\pgndec  \plain\plain\f0\fs24\pard\plain\f0\fs24\plain\f0\fs20\rjgc0159\hich\f0\dbch\f0\loch\f0\fs20 DATE OF SERVICE: 12-25-23 @ 13:02\par  \par  Patient is a 83y old  Male who presents with a chief complaint of abdominal pain (25 Dec 2023 10:09)\par  \par  \par  INTERVAL HISTORY: no complaints \par  \par  REVIEW OF SYSTEMS:\par  CONSTITUTIONAL: No weakness\par  EYES/ENT: No visual changes;  No throat pain \par  NECK: No pain or stiffness\par  RESPIRATORY: No cough, wheezing; No shortness of breath\par  CARDIOVASCULAR: No chest pain or palpitations\par  GASTROINTESTINAL: No abdominal  pain. No nausea, vomiting, or hematemesis\par  GENITOURINARY: No dysuria, frequency or hematuria\par  NEUROLOGICAL: No stroke like symptoms\par  SKIN: No rashes\par  \par  \par  MEDICATIONS:\par  \par  \par  \par  PHYSICAL EXAM:\par  T(C): 36.6 (12-25-23 @ 12:30), Max: 36.8 (12-24-23 @ 22:25)\par  HR: 71 (12-25-23 @ 12:30) (51 - 76)\par  BP: 115/60 (12-25-23 @ 12:30) (87/55 - 128/63)\par  RR: 18 (12-25-23 @ 12:30) (15 - 22)\par  SpO2: 98% (12-25-23 @ 12:30) (95% - 100%)\par  Wt(kg): --\par  I&O's Summary\par  \par  25 Dec 2023 07:01  -  25 Dec 2023 13:02\par  --------------------------------------------------------\par  IN: 0 mL / OUT: 300 mL / NET: -300 mL\par  \par  \par  Height (cm): 170.2 (12-25 @ 04:15)\par  Weight (kg): 52.8 (12-25 @ 04:15)\par  BMI (kg/m2): 18.2 (12-25 @ 04:15)\par  BSA (m2): 1.61 (12-25 @ 04:15)\par  \par  Appearance: In no distress\tab\par  HEENT:    PERRL, EOMI\tab\par  Cardiovascular:  S1 S2, No JVD\par  Respiratory: Lungs clear to auscultation\tab\par  Gastrointestinal:  Soft, Non-tender, + BS\tab\par  Vascularature:  No edema of LE\par  Psychiatric: Appropriate affect \par  Neuro: no acute focal deficits \par  \par  \par  \par           \par             11.0 \par  4.09  )-----------( 254      ( 25 Dec 2023 07:20 )\par             34.1 \par  \par  12-25\par  \par  142  |  108  |  7 \par  ----------------------------<  82\par  3.3<L>   |  24  |  1.00\par  \par  Ca    8.9      25 Dec 2023 07:20\par  Phos  3.4     12-25\par  Mg     2.2     12-25\par  \par  TPro  6.0  /  Alb  3.2<L>  /  TBili  0.3  /  DBili  x   /  AST  24  /  ALT  12  /  AlkPhos  160<H>  12-25\par  \par  \par  \par  Labs personally reviewed\par  \par  \par  ASSESSMENT/PLAN: \tab\par  \par  \ql\plain\f0\fs24\plain\f0\fs20\lmqa8709\hich\f0\dbch\f0\loch\f0\fs20 84yo 68kg m w pmh htn, hld, cad s/p pci w stent, sah, gerd, depression, metastatic prostate ca, p/w abdominal pain; found to have outpatient imaging findings suggestive of proctitis   w rectal perforation + ascending uti; sent to er and admitted to medicine for further mgmt.\par  \par  \par  \plain\f1\fs20\mmgk5015\hich\f1\dbch\f1\loch\f1\cf2\fs20\b\ul{\field{\*\fldinst HYPERLINK [id number] }{\fldrslt Problem/Plan - 1:}}\plain\f0\fs20\areu9795\hich\f0\dbch\f0\loch\f0\fs20\ql\par  \'b7  {\*\bkmkstart nc47826896433}{\*\bkmkend ss51415231140}Problem: {\*\bkmkstart bk34466973338}{\*\bkmkend ox42615186295}Rectal perforation. 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 Marked mid/lower rectal wall thickening, hypervascularity, and extraluminal air extending through the anal sphincter complex down to the anal verge. Given the   absence of recent procedure in this location, findings are concerning for rectal perforation."\par  s/p zosyn in ER\par  Monitor for fever, worsening white count\par  serial abdominal exams, serial abdominal imaging as needed\par  npo/bowel rest for now; ivf + lytes as needed in the mean time\par  supportive care with analgesics, antiemetics, antipyretics as needed\par  empirical abx w zosyn for now \par  -CRS consulted recs appreciated.\plain\f1\fs20\rpyb1098\hich\f1\dbch\f1\loch\f1\cf2\fs20\strike\plain\f0\fs20\qezl4475\hich\f0\dbch\f0\loch\f0\fs20\par  \par  \plain\f1\fs20\noul7748\hich\f1\dbch\f1\loch\f1\cf2\fs20\b\ul{\field{\*\fldinst HYPERLINK [id number] }{\fldrslt Problem/Plan - 2:}}\plain\f0\fs20\mfju7641\hich\f0\dbch\f0\loch\f0\fs20\ql\par  \'b7  {\*\bkmkstart rb05873166279}{\*\bkmkend hk86609298413}Problem: {\*\bkmkstart zm75107516072}{\*\bkmkend ia69168235227}Acute UTI. \par  \'b7  {\*\bkmkstart wj76951476934}{\*\bkmkend pg65425618217}Plan: {\*\bkmkstart vs83952835488}{\*\bkmkend yh88754562679}ua shows active sediment w large LE, pyuria, nitrites, bacteriuria; suggestive of uti\par  imaging shows "urinary bladder cystitis and ascending urinary tract infection. No CT evidence of pyelonephritis. No obstructing ureteral calculus or hydronephrosis."\par  browne in place\par  s/p zosyn in er\par   \par  \par  \plain\f1\fs20\jjay9471\hich\f1\dbch\f1\loch\f1\cf2\fs20\b\ul{\field{\*\fldinst HYPERLINK [id number] }{\fldrslt Problem/Plan - 3:}}\plain\f0\fs20\juxh0909\hich\f0\dbch\f0\loch\f0\fs20\ql\par  \'b7  {\*\bkmkstart wt22430928968}{\*\bkmkend gl84695431668}Problem: {\*\bkmkstart mu95235436939}{\*\bkmkend fc86323591140}CAD (coronary artery disease). \par  \'b7  {\*\bkmkstart gd17710018776}{\*\bkmkend pb22495987549}Plan: {\*\bkmkstart ps40648241949}{\*\bkmkend zf30152378778}s/p PCI RCA  2011 \par  c/w asa if ok with CRS\par  \par  \plain\f1\fs20\wyys7982\hich\f1\dbch\f1\loch\f1\cf2\fs20\b\ul{\field{\*\fldinst HYPERLINK [id number] }{\fldrslt Problem/Plan - 4:}}\plain\f0\fs20\ylgs8414\hich\f0\dbch\f0\loch\f0\fs20\ql\par  \'b7  {\*\bkmkstart bh36567216501}{\*\bkmkend fk17528335919}Problem: {\*\bkmkstart yk52930110319}{\*\bkmkend cq81305985650}HLD (hyperlipidemia). \par  \'b7  {\*\bkmkstart vb89529729099}{\*\bkmkend nh61934106288}Plan: c/w {\*\bkmkstart gf63931047379}{\*\bkmkend jq51405831662}simvastatin.\par  \pard\plain\f0\fs24\plain\f0\fs20\cvjb9002\hich\f0\dbch\f0\loch\f0\fs20\par  \par  \par  Jayla Cuadra PA-C\par  Rashawn Galan DO FACC\par  Cardiovascular Medicine\par  [address] [place] [address]\par  Office: [phone]\par  Available via call/text on [employer] Teams \par  }

## 2023-12-25 NOTE — PHYSICAL THERAPY INITIAL EVALUATION ADULT - ADDITIONAL COMMENTS
Pt reports that he lives with his son in a pvt house +6 [address] with a unilateral handrail and another 6 steps once inside. Pt states that he ambulates with a rolling walker and some assist as needed. Pt reports that his family is able to assist with all ADLs as needed. Pt also owns straight cane.

## 2023-12-25 NOTE — DISCHARGE NOTE PROVIDER - CARE PROVIDERS DIRECT ADDRESSES
[email or web address]'s [email or web address] ,[email or web address]

## 2023-12-25 NOTE — H&P ADULT - ASSESSMENT
84yo 68kg m w pmh htn, hld, cad s/p pci w stent, sah, gerd, depression, metastatic prostate ca, p/w abdominal pain; found to have outpatient imaging findings suggestive of proctitis w rectal perforation + ascending uti; sent to er and admitted to medicine for further mgmt. 82yo 68kg m w pmh htn, hld, cad s/p pci w stent, sah, gerd, depression, metastatic prostate ca, p/w abdominal pain; found to have outpatient imaging findings suggestive of proctitis w rectal perforation + ascending uti; sent to er and admitted to medicine for further mgmt.

## 2023-12-25 NOTE — DISCHARGE NOTE PROVIDER - NSDCFUADDAPPT_GEN_ALL_CORE_FT
APPTS ARE READY TO BE MADE: [ ] YES    Best Family or Patient Contact (if needed):    Additional Information about above appointments (if needed):    1: Internal Medicine   2: Hepatology   3:     Other comments or requests:

## 2023-12-25 NOTE — H&P ADULT - PROBLEM SELECTOR PLAN 2
imaging shows "urinary bladder cystitis and ascending urinary tract infection. No CT evidence of pyelonephritis. No obstructing ureteral calculus or hydronephrosis."  ua shows active sediment w large LE, pyuria, nitrites, bacteriuria; suggestive of uti Partial Purse String (Intermediate) Text: Given the location of the defect and the characteristics of the surrounding skin an intermediate purse string closure was deemed most appropriate.  Undermining was performed circumfirentially around the surgical defect.  A purse string suture was then placed and tightened. Wound tension only allowed a partial closure of the circular defect. ua shows active sediment w large LE, pyuria, nitrites, bacteriuria; suggestive of uti  imaging shows "urinary bladder cystitis and ascending urinary tract infection. No CT evidence of pyelonephritis. No obstructing ureteral calculus or hydronephrosis."  browne in place  s/p zosyn in er  follow up urine cultures  empirical abx w zosyn for now; deescalate based on final urine c+s

## 2023-12-25 NOTE — H&P ADULT - NSHPPOADEEPVENOUSTHROMB_GEN_A_CORE
[facility]  LOREN Izquierdo CNP  01/25/22 1921 no

## 2023-12-26 LAB
ALBUMIN SERPL ELPH-MCNC: 3.1 G/DL — LOW (ref 3.3–5)
ALBUMIN SERPL ELPH-MCNC: 3.1 G/DL — LOW (ref 3.3–5)
ALP SERPL-CCNC: 138 U/L — HIGH (ref 40–120)
ALP SERPL-CCNC: 138 U/L — HIGH (ref 40–120)
ALT FLD-CCNC: 11 U/L — SIGNIFICANT CHANGE UP (ref 10–45)
ALT FLD-CCNC: 11 U/L — SIGNIFICANT CHANGE UP (ref 10–45)
ANION GAP SERPL CALC-SCNC: 12 MMOL/L — SIGNIFICANT CHANGE UP (ref 5–17)
ANION GAP SERPL CALC-SCNC: 12 MMOL/L — SIGNIFICANT CHANGE UP (ref 5–17)
AST SERPL-CCNC: 21 U/L — SIGNIFICANT CHANGE UP (ref 10–40)
AST SERPL-CCNC: 21 U/L — SIGNIFICANT CHANGE UP (ref 10–40)
BASOPHILS # BLD AUTO: 0.05 K/UL — SIGNIFICANT CHANGE UP (ref 0–0.2)
BASOPHILS # BLD AUTO: 0.05 K/UL — SIGNIFICANT CHANGE UP (ref 0–0.2)
BASOPHILS NFR BLD AUTO: 1.2 % — SIGNIFICANT CHANGE UP (ref 0–2)
BASOPHILS NFR BLD AUTO: 1.2 % — SIGNIFICANT CHANGE UP (ref 0–2)
BILIRUB SERPL-MCNC: 0.4 MG/DL — SIGNIFICANT CHANGE UP (ref 0.2–1.2)
BILIRUB SERPL-MCNC: 0.4 MG/DL — SIGNIFICANT CHANGE UP (ref 0.2–1.2)
BUN SERPL-MCNC: 8 MG/DL — SIGNIFICANT CHANGE UP (ref 7–23)
BUN SERPL-MCNC: 8 MG/DL — SIGNIFICANT CHANGE UP (ref 7–23)
CALCIUM SERPL-MCNC: 8.7 MG/DL — SIGNIFICANT CHANGE UP (ref 8.4–10.5)
CALCIUM SERPL-MCNC: 8.7 MG/DL — SIGNIFICANT CHANGE UP (ref 8.4–10.5)
CHLORIDE SERPL-SCNC: 105 MMOL/L — SIGNIFICANT CHANGE UP (ref 96–108)
CHLORIDE SERPL-SCNC: 105 MMOL/L — SIGNIFICANT CHANGE UP (ref 96–108)
CO2 SERPL-SCNC: 23 MMOL/L — SIGNIFICANT CHANGE UP (ref 22–31)
CO2 SERPL-SCNC: 23 MMOL/L — SIGNIFICANT CHANGE UP (ref 22–31)
CREAT SERPL-MCNC: 1.05 MG/DL — SIGNIFICANT CHANGE UP (ref 0.5–1.3)
CREAT SERPL-MCNC: 1.05 MG/DL — SIGNIFICANT CHANGE UP (ref 0.5–1.3)
EGFR: 70 ML/MIN/1.73M2 — SIGNIFICANT CHANGE UP
EGFR: 70 ML/MIN/1.73M2 — SIGNIFICANT CHANGE UP
EOSINOPHIL # BLD AUTO: 0.16 K/UL — SIGNIFICANT CHANGE UP (ref 0–0.5)
EOSINOPHIL # BLD AUTO: 0.16 K/UL — SIGNIFICANT CHANGE UP (ref 0–0.5)
EOSINOPHIL NFR BLD AUTO: 3.7 % — SIGNIFICANT CHANGE UP (ref 0–6)
EOSINOPHIL NFR BLD AUTO: 3.7 % — SIGNIFICANT CHANGE UP (ref 0–6)
GLUCOSE SERPL-MCNC: 71 MG/DL — SIGNIFICANT CHANGE UP (ref 70–99)
GLUCOSE SERPL-MCNC: 71 MG/DL — SIGNIFICANT CHANGE UP (ref 70–99)
HCT VFR BLD CALC: 32.4 % — LOW (ref 39–50)
HCT VFR BLD CALC: 32.4 % — LOW (ref 39–50)
HGB BLD-MCNC: 10.4 G/DL — LOW (ref 13–17)
HGB BLD-MCNC: 10.4 G/DL — LOW (ref 13–17)
IMM GRANULOCYTES NFR BLD AUTO: 0.2 % — SIGNIFICANT CHANGE UP (ref 0–0.9)
IMM GRANULOCYTES NFR BLD AUTO: 0.2 % — SIGNIFICANT CHANGE UP (ref 0–0.9)
LYMPHOCYTES # BLD AUTO: 0.95 K/UL — LOW (ref 1–3.3)
LYMPHOCYTES # BLD AUTO: 0.95 K/UL — LOW (ref 1–3.3)
LYMPHOCYTES # BLD AUTO: 22 % — SIGNIFICANT CHANGE UP (ref 13–44)
LYMPHOCYTES # BLD AUTO: 22 % — SIGNIFICANT CHANGE UP (ref 13–44)
MAGNESIUM SERPL-MCNC: 1.8 MG/DL — SIGNIFICANT CHANGE UP (ref 1.6–2.6)
MAGNESIUM SERPL-MCNC: 1.8 MG/DL — SIGNIFICANT CHANGE UP (ref 1.6–2.6)
MCHC RBC-ENTMCNC: 29.7 PG — SIGNIFICANT CHANGE UP (ref 27–34)
MCHC RBC-ENTMCNC: 29.7 PG — SIGNIFICANT CHANGE UP (ref 27–34)
MCHC RBC-ENTMCNC: 32.1 GM/DL — SIGNIFICANT CHANGE UP (ref 32–36)
MCHC RBC-ENTMCNC: 32.1 GM/DL — SIGNIFICANT CHANGE UP (ref 32–36)
MCV RBC AUTO: 92.6 FL — SIGNIFICANT CHANGE UP (ref 80–100)
MCV RBC AUTO: 92.6 FL — SIGNIFICANT CHANGE UP (ref 80–100)
MONOCYTES # BLD AUTO: 0.37 K/UL — SIGNIFICANT CHANGE UP (ref 0–0.9)
MONOCYTES # BLD AUTO: 0.37 K/UL — SIGNIFICANT CHANGE UP (ref 0–0.9)
MONOCYTES NFR BLD AUTO: 8.6 % — SIGNIFICANT CHANGE UP (ref 2–14)
MONOCYTES NFR BLD AUTO: 8.6 % — SIGNIFICANT CHANGE UP (ref 2–14)
NEUTROPHILS # BLD AUTO: 2.77 K/UL — SIGNIFICANT CHANGE UP (ref 1.8–7.4)
NEUTROPHILS # BLD AUTO: 2.77 K/UL — SIGNIFICANT CHANGE UP (ref 1.8–7.4)
NEUTROPHILS NFR BLD AUTO: 64.3 % — SIGNIFICANT CHANGE UP (ref 43–77)
NEUTROPHILS NFR BLD AUTO: 64.3 % — SIGNIFICANT CHANGE UP (ref 43–77)
NRBC # BLD: 0 /100 WBCS — SIGNIFICANT CHANGE UP (ref 0–0)
NRBC # BLD: 0 /100 WBCS — SIGNIFICANT CHANGE UP (ref 0–0)
PHOSPHATE SERPL-MCNC: 3.3 MG/DL — SIGNIFICANT CHANGE UP (ref 2.5–4.5)
PHOSPHATE SERPL-MCNC: 3.3 MG/DL — SIGNIFICANT CHANGE UP (ref 2.5–4.5)
PLATELET # BLD AUTO: 241 K/UL — SIGNIFICANT CHANGE UP (ref 150–400)
PLATELET # BLD AUTO: 241 K/UL — SIGNIFICANT CHANGE UP (ref 150–400)
POTASSIUM SERPL-MCNC: 4 MMOL/L — SIGNIFICANT CHANGE UP (ref 3.5–5.3)
POTASSIUM SERPL-MCNC: 4 MMOL/L — SIGNIFICANT CHANGE UP (ref 3.5–5.3)
POTASSIUM SERPL-SCNC: 4 MMOL/L — SIGNIFICANT CHANGE UP (ref 3.5–5.3)
POTASSIUM SERPL-SCNC: 4 MMOL/L — SIGNIFICANT CHANGE UP (ref 3.5–5.3)
PROT SERPL-MCNC: 5.7 G/DL — LOW (ref 6–8.3)
PROT SERPL-MCNC: 5.7 G/DL — LOW (ref 6–8.3)
RBC # BLD: 3.5 M/UL — LOW (ref 4.2–5.8)
RBC # BLD: 3.5 M/UL — LOW (ref 4.2–5.8)
RBC # FLD: 16.6 % — HIGH (ref 10.3–14.5)
RBC # FLD: 16.6 % — HIGH (ref 10.3–14.5)
SODIUM SERPL-SCNC: 140 MMOL/L — SIGNIFICANT CHANGE UP (ref 135–145)
SODIUM SERPL-SCNC: 140 MMOL/L — SIGNIFICANT CHANGE UP (ref 135–145)
WBC # BLD: 4.31 K/UL — SIGNIFICANT CHANGE UP (ref 3.8–10.5)
WBC # BLD: 4.31 K/UL — SIGNIFICANT CHANGE UP (ref 3.8–10.5)
WBC # FLD AUTO: 4.31 K/UL — SIGNIFICANT CHANGE UP (ref 3.8–10.5)
WBC # FLD AUTO: 4.31 K/UL — SIGNIFICANT CHANGE UP (ref 3.8–10.5)

## 2023-12-26 PROCEDURE — 99232 SBSQ HOSP IP/OBS MODERATE 35: CPT | Mod: GC

## 2023-12-26 PROCEDURE — ZZZZZ: CPT

## 2023-12-26 PROCEDURE — 74177 CT ABD & PELVIS W/CONTRAST: CPT | Mod: 26

## 2023-12-26 RX ORDER — MAGNESIUM SULFATE 500 MG/ML
2 VIAL (ML) INJECTION ONCE
Refills: 0 | Status: COMPLETED | OUTPATIENT
Start: 2023-12-26 | End: 2023-12-26

## 2023-12-26 RX ADMIN — PIPERACILLIN AND TAZOBACTAM 25 GRAM(S): 4; .5 INJECTION, POWDER, LYOPHILIZED, FOR SOLUTION INTRAVENOUS at 05:00

## 2023-12-26 RX ADMIN — TAMSULOSIN HYDROCHLORIDE 0.4 MILLIGRAM(S): 0.4 CAPSULE ORAL at 21:30

## 2023-12-26 RX ADMIN — CHLORHEXIDINE GLUCONATE 1 APPLICATION(S): 213 SOLUTION TOPICAL at 11:26

## 2023-12-26 RX ADMIN — PANTOPRAZOLE SODIUM 40 MILLIGRAM(S): 20 TABLET, DELAYED RELEASE ORAL at 05:00

## 2023-12-26 RX ADMIN — SENNA PLUS 2 TABLET(S): 8.6 TABLET ORAL at 21:30

## 2023-12-26 RX ADMIN — FINASTERIDE 5 MILLIGRAM(S): 5 TABLET, FILM COATED ORAL at 11:25

## 2023-12-26 RX ADMIN — PIPERACILLIN AND TAZOBACTAM 25 GRAM(S): 4; .5 INJECTION, POWDER, LYOPHILIZED, FOR SOLUTION INTRAVENOUS at 13:03

## 2023-12-26 RX ADMIN — PIPERACILLIN AND TAZOBACTAM 25 GRAM(S): 4; .5 INJECTION, POWDER, LYOPHILIZED, FOR SOLUTION INTRAVENOUS at 21:25

## 2023-12-26 RX ADMIN — SIMVASTATIN 40 MILLIGRAM(S): 20 TABLET, FILM COATED ORAL at 21:30

## 2023-12-26 RX ADMIN — Medication 20 MILLIGRAM(S): at 11:25

## 2023-12-26 RX ADMIN — Medication 25 GRAM(S): at 10:02

## 2023-12-26 NOTE — PROGRESS NOTE ADULT - SUBJECTIVE AND OBJECTIVE BOX
DATE OF SERVICE: 12-26-23 @ 16:15    Patient is a 83y old  Male who presents with a chief complaint of abdominal pain (26 Dec 2023 13:55)      INTERVAL HISTORY: Feels ok.     REVIEW OF SYSTEMS:  CONSTITUTIONAL: No weakness  EYES/ENT: No visual changes;  No throat pain   NECK: No pain or stiffness  RESPIRATORY: No cough, wheezing; No shortness of breath  CARDIOVASCULAR: No chest pain or palpitations  GASTROINTESTINAL: No abdominal  pain. No nausea, vomiting, or hematemesis  GENITOURINARY: No dysuria, frequency or hematuria  NEUROLOGICAL: No stroke like symptoms  SKIN: No rashes    	  MEDICATIONS:        PHYSICAL EXAM:  T(C): 36.4 (12-26-23 @ 13:14), Max: 36.7 (12-25-23 @ 20:30)  HR: 58 (12-26-23 @ 13:14) (54 - 66)  BP: 95/53 (12-26-23 @ 13:14) (95/53 - 130/71)  RR: 20 (12-26-23 @ 13:14) (18 - 20)  SpO2: 97% (12-26-23 @ 13:14) (94% - 97%)  Wt(kg): --  I&O's Summary    25 Dec 2023 07:01  -  26 Dec 2023 07:00  --------------------------------------------------------  IN: 800 mL / OUT: 1500 mL / NET: -700 mL    26 Dec 2023 07:01  -  26 Dec 2023 16:15  --------------------------------------------------------  IN: 0 mL / OUT: 300 mL / NET: -300 mL          Appearance: In no distress	  HEENT:    PERRL, EOMI	  Cardiovascular:  S1 S2, No JVD  Respiratory: Lungs clear to auscultation	  Gastrointestinal:  Soft, Non-tender, + BS	  Vascularature:  No edema of LE  Psychiatric: Appropriate affect   Neuro: no acute focal deficits                               10.4   4.31  )-----------( 241      ( 26 Dec 2023 07:03 )             32.4     12-26    140  |  105  |  8   ----------------------------<  71  4.0   |  23  |  1.05    Ca    8.7      26 Dec 2023 07:08  Phos  3.3     12-26  Mg     1.8     12-26    TPro  5.7<L>  /  Alb  3.1<L>  /  TBili  0.4  /  DBili  x   /  AST  21  /  ALT  11  /  AlkPhos  138<H>  12-26        Labs personally reviewed        82yo 68kg m w pmh htn, hld, cad s/p pci w stent, sah, gerd, depression, metastatic prostate ca, p/w abdominal pain; found to have outpatient imaging findings suggestive of proctitis w rectal perforation + ascending uti; sent to er and admitted to medicine for further mgmt.      Problem/Plan - 1:  ·  Problem: Rectal perforation.   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 Marked mid/lower rectal wall thickening, hypervascularity, and extraluminal air extending through the anal sphincter complex down to the anal verge. Given the absence of recent procedure in this location, findings are concerning for rectal perforation."  s/p zosyn in ER  Monitor for fever, worsening white count  serial abdominal exams, serial abdominal imaging as needed  npo/bowel rest for now; ivf + lytes as needed in the mean time  supportive care with analgesics, antiemetics, antipyretics as needed  empirical abx w zosyn for now   -CRS consulted recs appreciated.    Problem/Plan - 2:  ·  Problem: Acute UTI.   ·  Plan: ua shows active sediment w large LE, pyuria, nitrites, bacteriuria; suggestive of uti  imaging shows "urinary bladder cystitis and ascending urinary tract infection. No CT evidence of pyelonephritis. No obstructing ureteral calculus or hydronephrosis."  browne in place- urology recs appreciated  s/p zosyn in er       Problem/Plan - 3:  ·  Problem: CAD (coronary artery disease).   ·  Plan: s/p PCI RCA  2011   c/w asa if ok with CRS    Problem/Plan - 4:  ·  Problem: HLD (hyperlipidemia).   ·  Plan: c/w simvastatin.            RAGHAV Gillette-HERSON Galan, [facility]  Cardiovascular Medicine  [address] [place] [address], [address]  Available through call or text on [employer] TEAM[employer]  Office: [phone]   DATE OF SERVICE: 12-26-23 @ 16:15    Patient is a 83y old  Male who presents with a chief complaint of abdominal pain (26 Dec 2023 13:55)      INTERVAL HISTORY: Feels ok.     REVIEW OF SYSTEMS:  CONSTITUTIONAL: No weakness  EYES/ENT: No visual changes;  No throat pain   NECK: No pain or stiffness  RESPIRATORY: No cough, wheezing; No shortness of breath  CARDIOVASCULAR: No chest pain or palpitations  GASTROINTESTINAL: No abdominal  pain. No nausea, vomiting, or hematemesis  GENITOURINARY: No dysuria, frequency or hematuria  NEUROLOGICAL: No stroke like symptoms  SKIN: No rashes    	  MEDICATIONS:        PHYSICAL EXAM:  T(C): 36.4 (12-26-23 @ 13:14), Max: 36.7 (12-25-23 @ 20:30)  HR: 58 (12-26-23 @ 13:14) (54 - 66)  BP: 95/53 (12-26-23 @ 13:14) (95/53 - 130/71)  RR: 20 (12-26-23 @ 13:14) (18 - 20)  SpO2: 97% (12-26-23 @ 13:14) (94% - 97%)  Wt(kg): --  I&O's Summary    25 Dec 2023 07:01  -  26 Dec 2023 07:00  --------------------------------------------------------  IN: 800 mL / OUT: 1500 mL / NET: -700 mL    26 Dec 2023 07:01  -  26 Dec 2023 16:15  --------------------------------------------------------  IN: 0 mL / OUT: 300 mL / NET: -300 mL          Appearance: In no distress	  HEENT:    PERRL, EOMI	  Cardiovascular:  S1 S2, No JVD  Respiratory: Lungs clear to auscultation	  Gastrointestinal:  Soft, Non-tender, + BS	  Vascularature:  No edema of LE  Psychiatric: Appropriate affect   Neuro: no acute focal deficits                               10.4   4.31  )-----------( 241      ( 26 Dec 2023 07:03 )             32.4     12-26    140  |  105  |  8   ----------------------------<  71  4.0   |  23  |  1.05    Ca    8.7      26 Dec 2023 07:08  Phos  3.3     12-26  Mg     1.8     12-26    TPro  5.7<L>  /  Alb  3.1<L>  /  TBili  0.4  /  DBili  x   /  AST  21  /  ALT  11  /  AlkPhos  138<H>  12-26        Labs personally reviewed        82yo 68kg m w pmh htn, hld, cad s/p pci w stent, sah, gerd, depression, metastatic prostate ca, p/w abdominal pain; found to have outpatient imaging findings suggestive of proctitis w rectal perforation + ascending uti; sent to er and admitted to medicine for further mgmt.      Problem/Plan - 1:  ·  Problem: Rectal perforation.   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 Marked mid/lower rectal wall thickening, hypervascularity, and extraluminal air extending through the anal sphincter complex down to the anal verge. Given the absence of recent procedure in this location, findings are concerning for rectal perforation."  s/p zosyn in ER  Monitor for fever, worsening white count  serial abdominal exams, serial abdominal imaging as needed  npo/bowel rest for now; ivf + lytes as needed in the mean time  supportive care with analgesics, antiemetics, antipyretics as needed  empirical abx w zosyn for now   -CRS consulted recs appreciated.    Problem/Plan - 2:  ·  Problem: Acute UTI.   ·  Plan: ua shows active sediment w large LE, pyuria, nitrites, bacteriuria; suggestive of uti  imaging shows "urinary bladder cystitis and ascending urinary tract infection. No CT evidence of pyelonephritis. No obstructing ureteral calculus or hydronephrosis."  browne in place- urology recs appreciated  s/p zosyn in er       Problem/Plan - 3:  ·  Problem: CAD (coronary artery disease).   ·  Plan: s/p PCI RCA  2011   c/w asa if ok with CRS    Problem/Plan - 4:  ·  Problem: HLD (hyperlipidemia).   ·  Plan: c/w simvastatin.            RAGHAV Gillette-HERSON Galan, [facility]  Cardiovascular Medicine  [address] [place] [address], [address]  Available through call or text on [employer] TEAM[employer]  Office: [phone]   DATE OF SERVICE: 12-26-23 @ 16:15    Patient is a 83y old  Male who presents with a chief complaint of abdominal pain (26 Dec 2023 13:55)      INTERVAL HISTORY: Feels ok.     REVIEW OF SYSTEMS:  CONSTITUTIONAL: No weakness  EYES/ENT: No visual changes;  No throat pain   NECK: No pain or stiffness  RESPIRATORY: No cough, wheezing; No shortness of breath  CARDIOVASCULAR: No chest pain or palpitations  GASTROINTESTINAL: No abdominal  pain. No nausea, vomiting, or hematemesis  GENITOURINARY: No dysuria, frequency or hematuria  NEUROLOGICAL: No stroke like symptoms  SKIN: No rashes    	  MEDICATIONS:        PHYSICAL EXAM:  T(C): 36.4 (12-26-23 @ 13:14), Max: 36.7 (12-25-23 @ 20:30)  HR: 58 (12-26-23 @ 13:14) (54 - 66)  BP: 95/53 (12-26-23 @ 13:14) (95/53 - 130/71)  RR: 20 (12-26-23 @ 13:14) (18 - 20)  SpO2: 97% (12-26-23 @ 13:14) (94% - 97%)  Wt(kg): --  I&O's Summary    25 Dec 2023 07:01  -  26 Dec 2023 07:00  --------------------------------------------------------  IN: 800 mL / OUT: 1500 mL / NET: -700 mL    26 Dec 2023 07:01  -  26 Dec 2023 16:15  --------------------------------------------------------  IN: 0 mL / OUT: 300 mL / NET: -300 mL          Appearance: In no distress	  HEENT:    PERRL, EOMI	  Cardiovascular:  S1 S2, No JVD  Respiratory: Lungs clear to auscultation	  Gastrointestinal:  Soft, Non-tender, + BS	  Vascularature:  No edema of LE  Psychiatric: Appropriate affect   Neuro: no acute focal deficits                               10.4   4.31  )-----------( 241      ( 26 Dec 2023 07:03 )             32.4     12-26    140  |  105  |  8   ----------------------------<  71  4.0   |  23  |  1.05    Ca    8.7      26 Dec 2023 07:08  Phos  3.3     12-26  Mg     1.8     12-26    TPro  5.7<L>  /  Alb  3.1<L>  /  TBili  0.4  /  DBili  x   /  AST  21  /  ALT  11  /  AlkPhos  138<H>  12-26        Labs personally reviewed      Assessment/Plan  82yo 68kg m w pmh htn, hld, cad s/p pci w stent, sah, gerd, depression, metastatic prostate ca, p/w abdominal pain; found to have outpatient imaging findings suggestive of proctitis w rectal perforation + ascending uti; sent to er and admitted to medicine for further mgmt.      Problem/Plan - 1:  ·  Problem: Rectal perforation.   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 Marked mid/lower rectal wall thickening, hypervascularity, and extraluminal air extending through the anal sphincter complex down to the anal verge. Given the absence of recent procedure in this location, findings are concerning for rectal perforation."  s/p zosyn in ER  Monitor for fever, worsening white count  serial abdominal exams, serial abdominal imaging as needed  npo/bowel rest for now; ivf + lytes as needed in the mean time  supportive care with analgesics, antiemetics, antipyretics as needed  empirical abx w zosyn for now   -CRS consulted recs appreciated.    Problem/Plan - 2:  ·  Problem: Acute UTI.   ·  Plan: ua shows active sediment w large LE, pyuria, nitrites, bacteriuria; suggestive of uti  imaging shows "urinary bladder cystitis and ascending urinary tract infection. No CT evidence of pyelonephritis. No obstructing ureteral calculus or hydronephrosis."  browne in place- urology recs appreciated  s/p zosyn in er       Problem/Plan - 3:  ·  Problem: CAD (coronary artery disease).   ·  Plan: s/p PCI RCA  2011   c/w asa if ok with CRS    Problem/Plan - 4:  ·  Problem: HLD (hyperlipidemia).   ·  Plan: c/w simvastatin.            RAGHAV Gilltete-HERSON Galan,  [facility]  Cardiovascular Medicine  [address] [place] [address], [address]  Available through call or text on [employer] TEAM[employer]  Office: [phone]   DATE OF SERVICE: 12-26-23 @ 16:15    Patient is a 83y old  Male who presents with a chief complaint of abdominal pain (26 Dec 2023 13:55)      INTERVAL HISTORY: Feels ok.     REVIEW OF SYSTEMS:  CONSTITUTIONAL: No weakness  EYES/ENT: No visual changes;  No throat pain   NECK: No pain or stiffness  RESPIRATORY: No cough, wheezing; No shortness of breath  CARDIOVASCULAR: No chest pain or palpitations  GASTROINTESTINAL: No abdominal  pain. No nausea, vomiting, or hematemesis  GENITOURINARY: No dysuria, frequency or hematuria  NEUROLOGICAL: No stroke like symptoms  SKIN: No rashes    	  MEDICATIONS:        PHYSICAL EXAM:  T(C): 36.4 (12-26-23 @ 13:14), Max: 36.7 (12-25-23 @ 20:30)  HR: 58 (12-26-23 @ 13:14) (54 - 66)  BP: 95/53 (12-26-23 @ 13:14) (95/53 - 130/71)  RR: 20 (12-26-23 @ 13:14) (18 - 20)  SpO2: 97% (12-26-23 @ 13:14) (94% - 97%)  Wt(kg): --  I&O's Summary    25 Dec 2023 07:01  -  26 Dec 2023 07:00  --------------------------------------------------------  IN: 800 mL / OUT: 1500 mL / NET: -700 mL    26 Dec 2023 07:01  -  26 Dec 2023 16:15  --------------------------------------------------------  IN: 0 mL / OUT: 300 mL / NET: -300 mL          Appearance: In no distress	  HEENT:    PERRL, EOMI	  Cardiovascular:  S1 S2, No JVD  Respiratory: Lungs clear to auscultation	  Gastrointestinal:  Soft, Non-tender, + BS	  Vascularature:  No edema of LE  Psychiatric: Appropriate affect   Neuro: no acute focal deficits                               10.4   4.31  )-----------( 241      ( 26 Dec 2023 07:03 )             32.4     12-26    140  |  105  |  8   ----------------------------<  71  4.0   |  23  |  1.05    Ca    8.7      26 Dec 2023 07:08  Phos  3.3     12-26  Mg     1.8     12-26    TPro  5.7<L>  /  Alb  3.1<L>  /  TBili  0.4  /  DBili  x   /  AST  21  /  ALT  11  /  AlkPhos  138<H>  12-26        Labs personally reviewed      Assessment/Plan  82yo 68kg m w pmh htn, hld, cad s/p pci w stent, sah, gerd, depression, metastatic prostate ca, p/w abdominal pain; found to have outpatient imaging findings suggestive of proctitis w rectal perforation + ascending uti; sent to er and admitted to medicine for further mgmt.      Problem/Plan - 1:  ·  Problem: Rectal perforation.   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 Marked mid/lower rectal wall thickening, hypervascularity, and extraluminal air extending through the anal sphincter complex down to the anal verge. Given the absence of recent procedure in this location, findings are concerning for rectal perforation."  s/p zosyn in ER  Monitor for fever, worsening white count  serial abdominal exams, serial abdominal imaging as needed  npo/bowel rest for now; ivf + lytes as needed in the mean time  supportive care with analgesics, antiemetics, antipyretics as needed  empirical abx w zosyn for now   -CRS consulted recs appreciated.    Problem/Plan - 2:  ·  Problem: Acute UTI.   ·  Plan: ua shows active sediment w large LE, pyuria, nitrites, bacteriuria; suggestive of uti  imaging shows "urinary bladder cystitis and ascending urinary tract infection. No CT evidence of pyelonephritis. No obstructing ureteral calculus or hydronephrosis."  browne in place- urology recs appreciated  s/p zosyn in er       Problem/Plan - 3:  ·  Problem: CAD (coronary artery disease).   ·  Plan: s/p PCI RCA  2011   c/w asa if ok with CRS    Problem/Plan - 4:  ·  Problem: HLD (hyperlipidemia).   ·  Plan: c/w simvastatin.            RAGHAV Gillette-HERSON Galan,  [facility]  Cardiovascular Medicine  [address] [place] [address], [address]  Available through call or text on [employer] TE[employer]  Office: [phone]

## 2023-12-26 NOTE — PROGRESS NOTE ADULT - PROBLEM SELECTOR PLAN 1
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 Marked mid/lower rectal wall thickening, hypervascularity, and extraluminal air extending through the anal sphincter complex down to the anal verge. 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 Marked mid/lower rectal wall thickening, hypervascularity, and extraluminal air extending through the anal sphincter complex down to the anal verge. Given the absence of recent procedure in this location, findings are concerning for rectal perforation."  s/p zosyn in ER  Monitor for fever, worsening white count  serial abdominal exams, serial abdominal imaging as needed  npo/bowel rest for now; ivf + lytes as needed in the mean time  supportive care with analgesics, antiemetics, antipyretics as needed  empirical abx w zosyn for now   -CRS consulted recs appreciated-> ordered follow up imaging to evaluate for rectal perf

## 2023-12-26 NOTE — CONSULT NOTE ADULT - ASSESSMENT
84 yo M with PMHx of HTN, HLD, CAD s/p PCI w/ stent, sah, gerd, depression, metastatic prostate cancer to bones/lung and local invasion to rectum p/w rectal perforation. 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  consulted given history of metastatic prostate cancer. Follows with Dr. Troy. Currently on Eligard and Casodex for management.    Afebrile, VSS. No leukocytosis. Cr 1.05. UCx 12/24 with >100K GNR. CT shows heterogenous prostate, cystitis, browne in place, no hydro, stones, pyelo. severe proctitis with associated known tumor extension and extraluminal air extending through the anal sphincter complex down to the anal verge. Colorectal following.     Recs:  - appreciate colorectal and medicine teams  - continue browne catheter  - follow up urine and blood cultures  - continue antibiotics for UTI  - outpatient follow up with Dr. Troy for TOV and further management of metastatic prostate CA    [facility]  [address] [place] [address], [address]  [place] [address] [phone]  82 yo M with PMHx of HTN, HLD, CAD s/p PCI w/ stent, sah, gerd, depression, metastatic prostate cancer to bones/lung and local invasion to rectum p/w rectal perforation. 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  consulted given history of metastatic prostate cancer. Follows with Dr. Troy. Currently on Eligard and Casodex for management.    Afebrile, VSS. No leukocytosis. Cr 1.05. UCx 12/24 with >100K GNR. CT shows heterogenous prostate, cystitis, browne in place, no hydro, stones, pyelo. severe proctitis with associated known tumor extension and extraluminal air extending through the anal sphincter complex down to the anal verge. Colorectal following.     Recs:  - appreciate colorectal and medicine teams  - continue browne catheter  - follow up urine and blood cultures  - continue antibiotics for UTI  - outpatient follow up with Dr. Troy for TOV and further management of metastatic prostate CA    [facility]  [address] [place] [address], [address]  [place] [address] [phone]  84 yo M with PMHx of HTN, HLD, CAD s/p PCI w/ stent, sah, gerd, depression, metastatic prostate cancer to bones/lung and local invasion to rectum p/w rectal perforation. 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  consulted given history of metastatic prostate cancer. Follows with Dr. Troy. Currently on Eligard and Casodex for management.    Afebrile, VSS. No leukocytosis. Cr 1.05. UCx 12/24 with >100K GNR. CT shows heterogenous prostate, cystitis, browne in place, no hydro, stones, pyelo. severe proctitis with associated known tumor extension and extraluminal air extending through the anal sphincter complex down to the anal verge. Colorectal following.     Recs:  - appreciate colorectal and medicine teams  - continue browne catheter  - follow up urine and blood cultures  - continue antibiotics for UTI  - outpatient follow up with Dr. Troy for TOV and further management of metastatic prostate CA  - Discussed with Dr. 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  consulted given history of metastatic prostate cancer. Follows with Dr. Troy. Currently on Eligard and Casodex for management.    Afebrile, VSS. No leukocytosis. Cr 1.05. UCx 12/24 with >100K GNR. CT shows heterogenous prostate, cystitis, browne in place, no hydro, stones, pyelo. severe proctitis with associated known tumor extension and extraluminal air extending through the anal sphincter complex down to the anal verge. Colorectal following.     Recs:  - appreciate colorectal and medicine teams  - continue browne catheter  - follow up urine and blood cultures  - continue antibiotics for UTI  - outpatient follow up with Dr. Troy for TOV and further management of metastatic prostate CA  - Discussed with Dr. Rivera    [facility]  [address] [place] [address], [address]  [place] [address] [phone]

## 2023-12-26 NOTE — PROGRESS NOTE ADULT - ATTENDING COMMENTS
84yo 68kg m w pmh htn, hld, cad s/p pci w stent, sah, gerd, depression, metastatic prostate ca, p/w abdominal pain; found to have outpatient imaging findings suggestive of proctitis w rectal perforation + ascending uti; sent to er and admitted to medicine for further mgmt.    1. Rectal perforation. 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 Marked mid/lower rectal wall thickening, hypervascularity, and extraluminal air extending through the anal sphincter complex down to the anal verge. Given the absence of recent procedure in this location, findings are concerning for rectal perforation." C/w Zosyn. Plan for repeat CT with rectal contrast. Surgery to place rectal tube. CRS consulted.   2. Acute UTI- C.w Zosyn as above   3. Prostate cancer metastatic to bone: Hold Op tx. C/w browne. Appreciate urology recs

## 2023-12-26 NOTE — CONSULT NOTE ADULT - SUBJECTIVE AND OBJECTIVE BOX
HPI: 84 yo M with PMHx of HTN, HLD, CAD s/p PCI w/ stent, sah, gerd, depression, metastatic prostate cancer to bones/lung and local invasion to rectum p/w rectal perforation. 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  consulted given history of metastatic prostate cancer. Follows with Dr. Troy. Currently on Eligard and Casodex for management. Also with urinary retention, currently being managed with indwelling browne catheter, last exchanged about 1 week ago per pt. Pt reports having severe abdominal pain at home. Also with dysuria and suprapubic pain. Was taking oral abx outpatient, which he completed but still with dysuria. Denies fever/chills, nausea/vomiting, hematuria, flank pain, catheter not draining or other acute complaints. Currently NPO. Still passing stool.    PAST MEDICAL & SURGICAL HISTORY:  CAD (Coronary Artery Disease)    Coronary Stent  in 2005    Hypercholesterolemia    BPH (Benign Prostatic Hypertrophy)    Depression    S/P Manipulation of Deviated Nasal Septum    History of Spinal Surgery  l/s spine        MEDICATIONS  (STANDING):  aspirin  chewable 81 milliGRAM(s) Oral daily  chlorhexidine 4% Liquid 1 Application(s) Topical daily  finasteride 5 milliGRAM(s) Oral daily  FLUoxetine 20 milliGRAM(s) Oral daily  influenza  Vaccine (HIGH DOSE) 0.7 milliLiter(s) IntraMuscular once  lactated ringers. 1000 milliLiter(s) (100 mL/Hr) IV Continuous <Continuous>  naloxone Injectable 0.4 milliGRAM(s) IV Push once  pantoprazole    Tablet 40 milliGRAM(s) Oral before breakfast  piperacillin/tazobactam IVPB.. 3.375 Gram(s) IV Intermittent every 8 hours  polyethylene glycol 3350 17 Gram(s) Oral daily  senna 2 Tablet(s) Oral at bedtime  simvastatin 40 milliGRAM(s) Oral at bedtime  tamsulosin 0.4 milliGRAM(s) Oral at bedtime    MEDICATIONS  (PRN):  acetaminophen     Tablet .. 650 milliGRAM(s) Oral every 6 hours PRN Temp greater or equal to 38C (100.4F), Mild Pain (1 - 3)  aluminum hydroxide/magnesium hydroxide/simethicone Suspension 30 milliLiter(s) Oral every 4 hours PRN Dyspepsia  bisacodyl 5 milliGRAM(s) Oral daily PRN Constipation  melatonin 3 milliGRAM(s) Oral at bedtime PRN Insomnia  ondansetron Injectable 4 milliGRAM(s) IV Push every 8 hours PRN Nausea and/or Vomiting  oxyCODONE    IR 2.5 milliGRAM(s) Oral every 4 hours PRN Moderate Pain (4 - 6)  oxyCODONE    IR 5 milliGRAM(s) Oral every 4 hours PRN Severe Pain (7 - 10)      FAMILY HISTORY:    Allergies  No Known Allergies    Intolerances    SOCIAL HISTORY:    REVIEW OF SYSTEMS: Otherwise negative as stated in HPI    Physical Exam  Vital signs  T(C): 36.4 (12-26-23 @ 13:14), Max: 36.7 (12-25-23 @ 20:30)  HR: 58 (12-26-23 @ 13:14)  BP: 95/53 (12-26-23 @ 13:14)  SpO2: 97% (12-26-23 @ 13:14)    Output  OUT:    Ureteral Catheter (mL): 850 mL    Voided (mL): 650 mL  Total OUT: 1500 mL      Gen: NAD  Pulm: No respiratory distress  Abd: S/ND/mild TTP of lower abdomen. no rebound or guarding  : browne with clear urine      LABS:                       10.4   4.31  )-----------( 241      ( 26 Dec 2023 07:03 )             32.4     12-26    140  |  105  |  8   ----------------------------<  71  4.0   |  23  |  1.05    Ca    8.7      26 Dec 2023 07:08  Phos  3.3     12-26  Mg     1.8     12-26    TPro  5.7<L>  /  Alb  3.1<L>  /  TBili  0.4  /  DBili  x   /  AST  21  /  ALT  11  /  AlkPhos  138<H>  12-26    PT/INR - ( 25 Dec 2023 07:20 )   PT: 11.3 sec;   INR: 1.08 ratio    PTT - ( 25 Dec 2023 07:20 )  PTT:28.7 sec    Urinalysis + Microscopic Examination (12.24.23 @ 16:05)    pH Urine: 6.0   Urine Appearance: Cloudy   Color: Yellow   Specific Gravity: 1.023   Protein, Urine: Trace mg/dL   Glucose Qualitative, Urine: Negative mg/dL   Ketone - Urine: Negative mg/dL   Blood, Urine: Trace   Bilirubin: Negative   Urobilinogen: 1.0 mg/dL   Leukocyte Esterase Concentration: Large   Nitrite: Positive   White Blood Cell - Urine: 98 /HPF   Red Blood Cell - Urine: 1 /HPF   Bacteria: Moderate /HPF   Cast: 1 /LPF   Epithelial Cells: 0 /HPF      Urine Cx: Culture - Urine (12.24.23 @ 16:05)    Specimen Source: Clean Catch Clean Catch (Midstream)   Culture Results:   >100,000 CFU/ml Gram Negative Rods      Blood Cx:  Culture - Blood (12.24.23 @ 15:05)    Specimen Source: .Blood Blood-Peripheral   Culture Results:   No growth at 24 hours    Culture - Blood (12.24.23 @ 14:50)    Specimen Source: .Blood Blood-Peripheral   Culture Results:   No growth at 24 hours    RADIOLOGY:  < from: CT Abdomen and Pelvis w/ IV Cont (12.22.23 @ 16:46) >  EXAM: [id number] - CT ABDOMEN AND PELVIS IC  - ORDERED BY: RENATO NAYAK      PROCEDURE DATE:  12/22/2023           INTERPRETATION:  CLINICAL INFORMATION: Weight loss and abdominal pain.   Prostate cancer history.    COMPARISON: Same day chest CT.Prostate MRI 9/28/2023.    CONTRAST/COMPLICATIONS:  IV Contrast: Omnipaque 350  90 cc administered   10 cc discarded  Oral Contrast: Omnipaque 300  Complications: None reported at time of study completion    PROCEDURE:  CT of the Abdomen and Pelvis was performed.  Sagittal and coronal reformats were performed.    FINDINGS:    LOWER CHEST: Multivessel coronary artery calcifications versus stents.   Correlate with history. Small aortic valve calcification. Mitral annular   calcification. Small hiatal hernia. No visualized pleural effusion.    LIVER: Liver size within normal limits. Main portal vein is patent. 4.4   cm hypodense liver lesion with peripheral nodular hypervascularity most   consistent with a hemangioma. Additional indeterminate inferior right   hepatic lesion measures 16 mm, image 49 series 3.  BILE DUCTS: No distention  GALLBLADDER: Contracted gallbladder  SPLEEN: Spleen size within normal limits  PANCREAS: No acute peripancreatic inflammation  ADRENALS: Unremarkable  KIDNEYS/URETERS: No hydronephrosis. Bilateral renal cysts, with calcified   septations on the right. Bilateral ureteral enhancement, right greater   than left. No obstructing ureteral calculus.    BLADDER: Underdistended with in situ Browne catheter. Bladder wall   thickening despite underdistention and surrounding inflammatory changes   concerning for cystitis.  REPRODUCTIVE ORGANS: Enlarged heterogeneous prostate, 6 cm in transverse   dimension.    BOWEL: Small hiatal hernia. Stomach is underdistended. Small   periampullary duodenal diverticulum. Appendix is unremarkable. Mild stool   burden of the colon. Findings of proctitis with known tumor extension   from prior imaging. Marked mid/lower rectal wall thickening,   hypervascularity, and extraluminal air extending through the anal   sphincter complex down to the anal verge. Given the absence of recent   procedure in this location, findings are concerning for rectal   perforation.  PERITONEUM: Trace fluid  VESSELS: No abdominal aortic aneurysm. Atheromatous changes.  RETROPERITONEUM/LYMPH NODES: Small volume nodes not enlarged by CT size   criteria. Nonspecific retroperitoneal stranding extending along the iliac   chains.  ABDOMINAL WALL: Tiny fat-containing right inguinal hernia.  BONES: Innumerable osseous osseous sclerotic lesions again reflective of   osseous metastatic disease. Mild L1 and L3 vertebral body compression   deformities, age indeterminate. Degenerative changes. Paralumbar   laminectomy.    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 Marked mid/lower rectal wall thickening, hypervascularity, and   extraluminal air extending through the anal sphincter complex down to the   anal verge. Given the absence of recent procedure in this location,   findings are concerning for rectal perforation.    Additional findings concerning for urinary bladder cystitis and ascending   urinary tract infection. No CT evidence of pyelonephritis. No obstructing   ureteral calculus or hydronephrosis.    These above findings were discussed with Dr. Nayak on 12/22/2023 at 8PM.    Enlarged heterogeneous prostate. Innumerable osseous metastases   consistent with known metastatic prostate malignancy. Mild L1 and L3   vertebral body compression deformities, age indeterminate.    Indeterminate inferior right hepatic lesion measures 1.6 cm, image 49   series 3. Additional 4.4 cm left hepatic lesion is most consistent with a   hemangioma. Abdominal MRI is recommended for characterizationwhen   feasible.    --- End of Report ---      VICTOR HUGO OLIVO M.D., ATTENDING RADIOLOGIST   This document has been electronically signed. Dec 22 2023  8:06PM    < end of copied text >     HPI: 84 yo M with PMHx of HTN, HLD, CAD s/p PCI w/ stent, sah, gerd, depression, metastatic prostate cancer to bones/lung and local invasion to rectum p/w rectal perforation. 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  consulted given history of metastatic prostate cancer. Follows with Dr. Troy. Currently on Eligard and Casodex for management. Also with urinary retention, currently being managed with indwelling browne catheter, last exchanged about 1 week ago per pt. Pt reports having severe abdominal pain at home. Also with dysuria and suprapubic pain. Was taking oral abx outpatient, which he completed but still with dysuria. Denies fever/chills, nausea/vomiting, hematuria, flank pain, catheter not draining or other acute complaints. Currently NPO. Still passing stool.    PAST MEDICAL & SURGICAL HISTORY:  CAD (Coronary Artery Disease)    Coronary Stent  in 2005    Hypercholesterolemia    BPH (Benign Prostatic Hypertrophy)    Depression    S/P Manipulation of Deviated Nasal Septum    History of Spinal Surgery  l/s spine        MEDICATIONS  (STANDING):  aspirin  chewable 81 milliGRAM(s) Oral daily  chlorhexidine 4% Liquid 1 Application(s) Topical daily  finasteride 5 milliGRAM(s) Oral daily  FLUoxetine 20 milliGRAM(s) Oral daily  influenza  Vaccine (HIGH DOSE) 0.7 milliLiter(s) IntraMuscular once  lactated ringers. 1000 milliLiter(s) (100 mL/Hr) IV Continuous <Continuous>  naloxone Injectable 0.4 milliGRAM(s) IV Push once  pantoprazole    Tablet 40 milliGRAM(s) Oral before breakfast  piperacillin/tazobactam IVPB.. 3.375 Gram(s) IV Intermittent every 8 hours  polyethylene glycol 3350 17 Gram(s) Oral daily  senna 2 Tablet(s) Oral at bedtime  simvastatin 40 milliGRAM(s) Oral at bedtime  tamsulosin 0.4 milliGRAM(s) Oral at bedtime    MEDICATIONS  (PRN):  acetaminophen     Tablet .. 650 milliGRAM(s) Oral every 6 hours PRN Temp greater or equal to 38C (100.4F), Mild Pain (1 - 3)  aluminum hydroxide/magnesium hydroxide/simethicone Suspension 30 milliLiter(s) Oral every 4 hours PRN Dyspepsia  bisacodyl 5 milliGRAM(s) Oral daily PRN Constipation  melatonin 3 milliGRAM(s) Oral at bedtime PRN Insomnia  ondansetron Injectable 4 milliGRAM(s) IV Push every 8 hours PRN Nausea and/or Vomiting  oxyCODONE    IR 2.5 milliGRAM(s) Oral every 4 hours PRN Moderate Pain (4 - 6)  oxyCODONE    IR 5 milliGRAM(s) Oral every 4 hours PRN Severe Pain (7 - 10)      FAMILY HISTORY:    Allergies  No Known Allergies    Intolerances    SOCIAL HISTORY:    REVIEW OF SYSTEMS: Otherwise negative as stated in HPI    Physical Exam  Vital signs  T(C): 36.4 (12-26-23 @ 13:14), Max: 36.7 (12-25-23 @ 20:30)  HR: 58 (12-26-23 @ 13:14)  BP: 95/53 (12-26-23 @ 13:14)  SpO2: 97% (12-26-23 @ 13:14)    Output  OUT:    Ureteral Catheter (mL): 850 mL    Voided (mL): 650 mL  Total OUT: 1500 mL      Gen: NAD  Pulm: No respiratory distress  Abd: S/ND/mild TTP of lower abdomen. no rebound or guarding  : browne with clear urine      LABS:                       10.4   4.31  )-----------( 241      ( 26 Dec 2023 07:03 )             32.4     12-26    140  |  105  |  8   ----------------------------<  71  4.0   |  23  |  1.05    Ca    8.7      26 Dec 2023 07:08  Phos  3.3     12-26  Mg     1.8     12-26    TPro  5.7<L>  /  Alb  3.1<L>  /  TBili  0.4  /  DBili  x   /  AST  21  /  ALT  11  /  AlkPhos  138<H>  12-26    PT/INR - ( 25 Dec 2023 07:20 )   PT: 11.3 sec;   INR: 1.08 ratio    PTT - ( 25 Dec 2023 07:20 )  PTT:28.7 sec    Urinalysis + Microscopic Examination (12.24.23 @ 16:05)    pH Urine: 6.0   Urine Appearance: Cloudy   Color: Yellow   Specific Gravity: 1.023   Protein, Urine: Trace mg/dL   Glucose Qualitative, Urine: Negative mg/dL   Ketone - Urine: Negative mg/dL   Blood, Urine: Trace   Bilirubin: Negative   Urobilinogen: 1.0 mg/dL   Leukocyte Esterase Concentration: Large   Nitrite: Positive   White Blood Cell - Urine: 98 /HPF   Red Blood Cell - Urine: 1 /HPF   Bacteria: Moderate /HPF   Cast: 1 /LPF   Epithelial Cells: 0 /HPF      Urine Cx: Culture - Urine (12.24.23 @ 16:05)    Specimen Source: Clean Catch Clean Catch (Midstream)   Culture Results:   >100,000 CFU/ml Gram Negative Rods      Blood Cx:  Culture - Blood (12.24.23 @ 15:05)    Specimen Source: .Blood Blood-Peripheral   Culture Results:   No growth at 24 hours    Culture - Blood (12.24.23 @ 14:50)    Specimen Source: .Blood Blood-Peripheral   Culture Results:   No growth at 24 hours    RADIOLOGY:  < from: CT Abdomen and Pelvis w/ IV Cont (12.22.23 @ 16:46) >  EXAM: [id number] - CT ABDOMEN AND PELVIS IC  - ORDERED BY: RENATO NAYAK      PROCEDURE DATE:  12/22/2023           INTERPRETATION:  CLINICAL INFORMATION: Weight loss and abdominal pain.   Prostate cancer history.    COMPARISON: Same day chest CT.Prostate MRI 9/28/2023.    CONTRAST/COMPLICATIONS:  IV Contrast: Omnipaque 350  90 cc administered   10 cc discarded  Oral Contrast: Omnipaque 300  Complications: None reported at time of study completion    PROCEDURE:  CT of the Abdomen and Pelvis was performed.  Sagittal and coronal reformats were performed.    FINDINGS:    LOWER CHEST: Multivessel coronary artery calcifications versus stents.   Correlate with history. Small aortic valve calcification. Mitral annular   calcification. Small hiatal hernia. No visualized pleural effusion.    LIVER: Liver size within normal limits. Main portal vein is patent. 4.4   cm hypodense liver lesion with peripheral nodular hypervascularity most   consistent with a hemangioma. Additional indeterminate inferior right   hepatic lesion measures 16 mm, image 49 series 3.  BILE DUCTS: No distention  GALLBLADDER: Contracted gallbladder  SPLEEN: Spleen size within normal limits  PANCREAS: No acute peripancreatic inflammation  ADRENALS: Unremarkable  KIDNEYS/URETERS: No hydronephrosis. Bilateral renal cysts, with calcified   septations on the right. Bilateral ureteral enhancement, right greater   than left. No obstructing ureteral calculus.    BLADDER: Underdistended with in situ Browne catheter. Bladder wall   thickening despite underdistention and surrounding inflammatory changes   concerning for cystitis.  REPRODUCTIVE ORGANS: Enlarged heterogeneous prostate, 6 cm in transverse   dimension.    BOWEL: Small hiatal hernia. Stomach is underdistended. Small   periampullary duodenal diverticulum. Appendix is unremarkable. Mild stool   burden of the colon. Findings of proctitis with known tumor extension   from prior imaging. Marked mid/lower rectal wall thickening,   hypervascularity, and extraluminal air extending through the anal   sphincter complex down to the anal verge. Given the absence of recent   procedure in this location, findings are concerning for rectal   perforation.  PERITONEUM: Trace fluid  VESSELS: No abdominal aortic aneurysm. Atheromatous changes.  RETROPERITONEUM/LYMPH NODES: Small volume nodes not enlarged by CT size   criteria. Nonspecific retroperitoneal stranding extending along the iliac   chains.  ABDOMINAL WALL: Tiny fat-containing right inguinal hernia.  BONES: Innumerable osseous osseous sclerotic lesions again reflective of   osseous metastatic disease. Mild L1 and L3 vertebral body compression   deformities, age indeterminate. Degenerative changes. Paralumbar   laminectomy.    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 Marked mid/lower rectal wall thickening, hypervascularity, and   extraluminal air extending through the anal sphincter complex down to the   anal verge. Given the absence of recent procedure in this location,   findings are concerning for rectal perforation.    Additional findings concerning for urinary bladder cystitis and ascending   urinary tract infection. No CT evidence of pyelonephritis. No obstructing   ureteral calculus or hydronephrosis.    These above findings were discussed with Dr. Nayak on 12/22/2023 at 8PM.    Enlarged heterogeneous prostate. Innumerable osseous metastases   consistent with known metastatic prostate malignancy. Mild L1 and L3   vertebral body compression deformities, age indeterminate.    Indeterminate inferior right hepatic lesion measures 1.6 cm, image 49   series 3. Additional 4.4 cm left hepatic lesion is most consistent with a   hemangioma. Abdominal MRI is recommended for characterizationwhen   feasible.    --- End of Report ---      VICTOR HUGO OLVIO M.D., ATTENDING RADIOLOGIST   This document has been electronically signed. Dec 22 2023  8:06PM    < end of copied text >

## 2023-12-26 NOTE — PROGRESS NOTE ADULT - SUBJECTIVE AND OBJECTIVE BOX
SURGERY DAILY PROGRESS NOTE    --------------- OVERNIGHT/INTERVAL---------------  - No acute events overnight    --------------- SUBJECTIVE ---------------  - Patient seen and examined at bedside with surgical team    --------------- OBJECTIVE ---------------  -----VITALS-----  T(C): 36.3, Max: 36.7 (12-25)  T(F): 97.4, Max: 98 (12-25)  HR: 54 (54 - 71)  BP: 114/64 (114/64 - 130/71)  BP(mean): --  ABP: --  ABP(mean): --  RR: 18 (18 - 18)  SpO2: 97% (94% - 98%)  CVP(mm Hg): --  CVP(cm H2O): --  room air            -----PHYSICAL EXAM-----  GENERAL: NAD, lying in bed   NEURO: AOx3, awake alert appropriate  HEENT: NCAT, trachea midline  PULM: Respirations non-labored  ABD: Soft, non-tender, non-distended, no peritonitis/rebound tenderness  EXT: Warm, well perfused    -----DRAINS-----  ANGELA:      Chest Tube:      NG Tube:     -----INs & OUTs-----      -----MEDICATIONS-----  STANDING  aspirin  chewable 81 milliGRAM(s) Oral daily  chlorhexidine 4% Liquid 1 Application(s) Topical daily  finasteride 5 milliGRAM(s) Oral daily  FLUoxetine 20 milliGRAM(s) Oral daily  influenza  Vaccine (HIGH DOSE) 0.7 milliLiter(s) IntraMuscular once  lactated ringers. 1000 milliLiter(s) (100 mL/Hr) IV Continuous <Continuous>  naloxone Injectable 0.4 milliGRAM(s) IV Push once  pantoprazole    Tablet 40 milliGRAM(s) Oral before breakfast  piperacillin/tazobactam IVPB.. 3.375 Gram(s) IV Intermittent every 8 hours  polyethylene glycol 3350 17 Gram(s) Oral daily  senna 2 Tablet(s) Oral at bedtime  simvastatin 40 milliGRAM(s) Oral at bedtime  tamsulosin 0.4 milliGRAM(s) Oral at bedtime    PRN  acetaminophen     Tablet .. 650 milliGRAM(s) Oral every 6 hours PRN Temp greater or equal to 38C (100.4F), Mild Pain (1 - 3)  aluminum hydroxide/magnesium hydroxide/simethicone Suspension 30 milliLiter(s) Oral every 4 hours PRN Dyspepsia  bisacodyl 5 milliGRAM(s) Oral daily PRN Constipation  melatonin 3 milliGRAM(s) Oral at bedtime PRN Insomnia  ondansetron Injectable 4 milliGRAM(s) IV Push every 8 hours PRN Nausea and/or Vomiting  oxyCODONE    IR 5 milliGRAM(s) Oral every 4 hours PRN Severe Pain (7 - 10)  oxyCODONE    IR 2.5 milliGRAM(s) Oral every 4 hours PRN Moderate Pain (4 - 6)    -----LABS-----  CBC (12-26 @ 07:03)                              10.4<L>                         4.31    )----------------(  241        64.3  % Neutrophils, 22.0  % Lymphocytes, ANC: 2.77                                32.4<L>  CBC (12-25 @ 07:20)                              11.0<L>                         4.09    )----------------(  254        58.3  % Neutrophils, 24.9  % Lymphocytes, ANC: 2.38                                34.1<L>    BMP (12-26 @ 07:08)             140     |  105     |  8     		Ca++ --      Ca 8.7                ---------------------------------( 71    		Mg 1.8                4.0     |  23      |  1.05  			Ph 3.3     BMP (12-25 @ 07:20)             142     |  108     |  7     		Ca++ --      Ca 8.9                ---------------------------------( 82    		Mg 2.2                3.3<L>  |  24      |  1.00  			Ph 3.4       LFTs (12-26 @ 07:08)      TPro 5.7<L> / Alb 3.1<L> / TBili 0.4 / DBili -- / AST 21 / ALT 11 / AlkPhos 138<H>  LFTs (12-25 @ 07:20)      TPro 6.0 / Alb 3.2<L> / TBili 0.3 / DBili -- / AST 24 / ALT 12 / AlkPhos 160<H>    Coags (12-25 @ 07:20)  aPTT 28.7 / INR 1.08 / PT 11.3        VBG (12-24 @ 23:22)     7.38 / 44 / 47<H> / 26 / 0.6 / 80.7%     Lactate: 0.9    Urinalysis (12-26 @ 07:08):     Color:  / Appearance:  / SG:  / pH:  / Gluc: 71 / Ketones:  / Bili:  / Urobili:  / Protein : / Nitrites:  / Leuk.Est:  / RBC:  / WBC:  / Sq Epi:  / Non Sq Epi:  / Bacteria        -> Clean Catch Clean Catch (Midstream) Culture (12-24 @ 16:05)     NG    NG    >100,000 CFU/ml Gram Negative Rods<!>    -> .Blood Blood-Peripheral Culture (12-24 @ 15:05)     NG    NG    No growth at 24 hours    -> .Blood Blood-Peripheral Culture (12-24 @ 14:50)     NG    NG    No growth at 24 hours

## 2023-12-26 NOTE — PROGRESS NOTE ADULT - SUBJECTIVE AND OBJECTIVE BOX
***************************************************************  Jn Lira, PGY1  Internal Medicine   Preferred contact via [employer] Teams   ***************************************************************    WHIT MATTHEWS  83y  MRN: [id number]    Patient is a 83y old  Male who presents with a chief complaint of abdominal pain (25 Dec 2023 13:02)      Interval/Overnight Events: no events ON.     Subjective: Pt seen and examined at bedside. Denies fever, CP, SOB, abn pain, N/V, dysuria. Tolerating diet.      MEDICATIONS  (STANDING):  aspirin  chewable 81 milliGRAM(s) Oral daily  chlorhexidine 4% Liquid 1 Application(s) Topical daily  finasteride 5 milliGRAM(s) Oral daily  FLUoxetine 20 milliGRAM(s) Oral daily  influenza  Vaccine (HIGH DOSE) 0.7 milliLiter(s) IntraMuscular once  lactated ringers. 1000 milliLiter(s) (100 mL/Hr) IV Continuous <Continuous>  naloxone Injectable 0.4 milliGRAM(s) IV Push once  pantoprazole    Tablet 40 milliGRAM(s) Oral before breakfast  piperacillin/tazobactam IVPB.. 3.375 Gram(s) IV Intermittent every 8 hours  polyethylene glycol 3350 17 Gram(s) Oral daily  senna 2 Tablet(s) Oral at bedtime  simvastatin 40 milliGRAM(s) Oral at bedtime  tamsulosin 0.4 milliGRAM(s) Oral at bedtime    MEDICATIONS  (PRN):  acetaminophen     Tablet .. 650 milliGRAM(s) Oral every 6 hours PRN Temp greater or equal to 38C (100.4F), Mild Pain (1 - 3)  aluminum hydroxide/magnesium hydroxide/simethicone Suspension 30 milliLiter(s) Oral every 4 hours PRN Dyspepsia  bisacodyl 5 milliGRAM(s) Oral daily PRN Constipation  melatonin 3 milliGRAM(s) Oral at bedtime PRN Insomnia  ondansetron Injectable 4 milliGRAM(s) IV Push every 8 hours PRN Nausea and/or Vomiting  oxyCODONE    IR 5 milliGRAM(s) Oral every 4 hours PRN Severe Pain (7 - 10)  oxyCODONE    IR 2.5 milliGRAM(s) Oral every 4 hours PRN Moderate Pain (4 - 6)      Objective:    Vitals: Vital Signs Last 24 Hrs  T(C): 36.3 (12-26-23 @ 04:18), Max: 36.7 (12-25-23 @ 20:30)  T(F): 97.4 (12-26-23 @ 04:18), Max: 98 (12-25-23 @ 20:30)  HR: 54 (12-26-23 @ 04:18) (54 - 71)  BP: 114/64 (12-26-23 @ 04:18) (111/58 - 130/71)  BP(mean): --  RR: 18 (12-26-23 @ 04:18) (18 - 18)  SpO2: 97% (12-26-23 @ 04:18) (94% - 98%)                I&O's Summary    25 Dec 2023 07:01  -  26 Dec 2023 07:00  --------------------------------------------------------  IN: 800 mL / OUT: 1500 mL / NET: -700 mL        PHYSICAL EXAM:  GENERAL: NAD  HEAD:  Atraumatic, Normocephalic  EYES: EOMI, conjunctiva and sclera clear  CHEST/LUNG: Clear to auscultation bilaterally; No rales, rhonchi, wheezing, or rubs  HEART: Regular rate and rhythm; No murmurs, rubs, or gallops  ABDOMEN: Soft, Nontender, Nondistended;   SKIN: No rashes or lesions  NERVOUS SYSTEM:  Alert & Oriented X3, no focal deficits    LABS:                        11.0   4.09  )-----------( 254      ( 25 Dec 2023 07:20 )             34.1                         11.6   3.83  )-----------( 257      ( 24 Dec 2023 16:05 )             36.4     12-25    142  |  108  |  7   ----------------------------<  82  3.3<L>   |  24  |  1.00  12-24    138  |  105  |  9   ----------------------------<  116<H>  3.4<L>   |  25  |  1.00    Ca    8.9      25 Dec 2023 07:20  Ca    9.6      24 Dec 2023 16:05  Phos  3.4     12-25  Mg     2.2     12-25    TPro  6.0  /  Alb  3.2<L>  /  TBili  0.3  /  DBili  x   /  AST  24  /  ALT  12  /  AlkPhos  160<H>  12-25  TPro  7.1  /  Alb  3.7  /  TBili  0.4  /  DBili  x   /  AST  29  /  ALT  16  /  AlkPhos  187<H>  12-24    CAPILLARY BLOOD GLUCOSE        PT/INR - ( 25 Dec 2023 07:20 )   PT: 11.3 sec;   INR: 1.08 ratio         PTT - ( 25 Dec 2023 07:20 )  PTT:28.7 sec    Urinalysis Basic - ( 25 Dec 2023 07:20 )    Color: x / Appearance: x / SG: x / pH: x  Gluc: 82 mg/dL / Ketone: x  / Bili: x / Urobili: x   Blood: x / Protein: x / Nitrite: x   Leuk Esterase: x / RBC: x / WBC x   Sq Epi: x / Non Sq Epi: x / Bacteria: x          RADIOLOGY & ADDITIONAL TESTS:    Imaging Personally Reviewed:  [x ] YES  [ ] NO    Consultants involved in case:   Consultant(s) Notes Reviewed:  [ x] YES  [ ] NO:   Care Discussed with Consultants/Other Providers [x ] YES  [ ] NO         ***************************************************************  Jn Lira, PGY1  Internal Medicine   Preferred contact via [employer] Teams   ***************************************************************    WHIT MATTHEWS  83y  MRN: [id number]    Patient is a 83y old  Male who presents with a chief complaint of abdominal pain (25 Dec 2023 13:02)      Interval/Overnight Events: no events ON.     Subjective: Pt seen and examined at bedside. Denies fever, CP, SOB, abn pain, N/V, dysuria. Tolerating diet.      MEDICATIONS  (STANDING):  aspirin  chewable 81 milliGRAM(s) Oral daily  chlorhexidine 4% Liquid 1 Application(s) Topical daily  finasteride 5 milliGRAM(s) Oral daily  FLUoxetine 20 milliGRAM(s) Oral daily  influenza  Vaccine (HIGH DOSE) 0.7 milliLiter(s) IntraMuscular once  lactated ringers. 1000 milliLiter(s) (100 mL/Hr) IV Continuous <Continuous>  naloxone Injectable 0.4 milliGRAM(s) IV Push once  pantoprazole    Tablet 40 milliGRAM(s) Oral before breakfast  piperacillin/tazobactam IVPB.. 3.375 Gram(s) IV Intermittent every 8 hours  polyethylene glycol 3350 17 Gram(s) Oral daily  senna 2 Tablet(s) Oral at bedtime  simvastatin 40 milliGRAM(s) Oral at bedtime  tamsulosin 0.4 milliGRAM(s) Oral at bedtime    MEDICATIONS  (PRN):  acetaminophen     Tablet .. 650 milliGRAM(s) Oral every 6 hours PRN Temp greater or equal to 38C (100.4F), Mild Pain (1 - 3)  aluminum hydroxide/magnesium hydroxide/simethicone Suspension 30 milliLiter(s) Oral every 4 hours PRN Dyspepsia  bisacodyl 5 milliGRAM(s) Oral daily PRN Constipation  melatonin 3 milliGRAM(s) Oral at bedtime PRN Insomnia  ondansetron Injectable 4 milliGRAM(s) IV Push every 8 hours PRN Nausea and/or Vomiting  oxyCODONE    IR 5 milliGRAM(s) Oral every 4 hours PRN Severe Pain (7 - 10)  oxyCODONE    IR 2.5 milliGRAM(s) Oral every 4 hours PRN Moderate Pain (4 - 6)      Objective:    Vitals: Vital Signs Last 24 Hrs  T(C): 36.3 (12-26-23 @ 04:18), Max: 36.7 (12-25-23 @ 20:30)  T(F): 97.4 (12-26-23 @ 04:18), Max: 98 (12-25-23 @ 20:30)  HR: 54 (12-26-23 @ 04:18) (54 - 71)  BP: 114/64 (12-26-23 @ 04:18) (111/58 - 130/71)  BP(mean): --  RR: 18 (12-26-23 @ 04:18) (18 - 18)  SpO2: 97% (12-26-23 @ 04:18) (94% - 98%)                I&O's Summary    25 Dec 2023 07:01  -  26 Dec 2023 07:00  --------------------------------------------------------  IN: 800 mL / OUT: 1500 mL / NET: -700 mL        PHYSICAL EXAM:  GENERAL: NAD  HEAD:  Atraumatic, Normocephalic  EYES: EOMI, conjunctiva and sclera clear  CHEST/LUNG: Clear to auscultation bilaterally; No rales, rhonchi, wheezing, or rubs  HEART: Regular rate and rhythm; No murmurs, rubs, or gallops  ABDOMEN: Soft, Nontender, Nondistended;   SKIN: No rashes or lesions  NERVOUS SYSTEM:  Alert & Oriented X3, no focal deficits    LABS:                        11.0   4.09  )-----------( 254      ( 25 Dec 2023 07:20 )             34.1                         11.6   3.83  )-----------( 257      ( 24 Dec 2023 16:05 )             36.4     12-25    142  |  108  |  7   ----------------------------<  82  3.3<L>   |  24  |  1.00  12-24    138  |  105  |  9   ----------------------------<  116<H>  3.4<L>   |  25  |  1.00    Ca    8.9      25 Dec 2023 07:20  Ca    9.6      24 Dec 2023 16:05  Phos  3.4     12-25  Mg     2.2     12-25    TPro  6.0  /  Alb  3.2<L>  /  TBili  0.3  /  DBili  x   /  AST  24  /  ALT  12  /  AlkPhos  160<H>  12-25  TPro  7.1  /  Alb  3.7  /  TBili  0.4  /  DBili  x   /  AST  29  /  ALT  16  /  AlkPhos  187<H>  12-24    CAPILLARY BLOOD GLUCOSE        PT/INR - ( 25 Dec 2023 07:20 )   PT: 11.3 sec;   INR: 1.08 ratio         PTT - ( 25 Dec 2023 07:20 )  PTT:28.7 sec    Urinalysis Basic - ( 25 Dec 2023 07:20 )    Color: x / Appearance: x / SG: x / pH: x  Gluc: 82 mg/dL / Ketone: x  / Bili: x / Urobili: x   Blood: x / Protein: x / Nitrite: x   Leuk Esterase: x / RBC: x / WBC x   Sq Epi: x / Non Sq Epi: x / Bacteria: x          RADIOLOGY & ADDITIONAL TESTS:    Imaging Personally Reviewed:  [x ] YES  [ ] NO    Consultants involved in case:   Consultant(s) Notes Reviewed:  [ x] YES  [ ] NO:   Care Discussed with Consultants/Other Providers [x ] YES  [ ] NO         ***************************************************************  Jn Lira, PGY1  Internal Medicine   Preferred contact via [employer] Teams   ***************************************************************    WHIT MATTHEWS  83y  MRN: [id number]    Patient is a 83y old  Male who presents with a chief complaint of abdominal pain (25 Dec 2023 13:02)      Interval/Overnight Events: no events ON.     Subjective: Pt seen and examined at bedside. Pt denied any abdominal pain.     MEDICATIONS  (STANDING):  aspirin  chewable 81 milliGRAM(s) Oral daily  chlorhexidine 4% Liquid 1 Application(s) Topical daily  finasteride 5 milliGRAM(s) Oral daily  FLUoxetine 20 milliGRAM(s) Oral daily  influenza  Vaccine (HIGH DOSE) 0.7 milliLiter(s) IntraMuscular once  lactated ringers. 1000 milliLiter(s) (100 mL/Hr) IV Continuous <Continuous>  naloxone Injectable 0.4 milliGRAM(s) IV Push once  pantoprazole    Tablet 40 milliGRAM(s) Oral before breakfast  piperacillin/tazobactam IVPB.. 3.375 Gram(s) IV Intermittent every 8 hours  polyethylene glycol 3350 17 Gram(s) Oral daily  senna 2 Tablet(s) Oral at bedtime  simvastatin 40 milliGRAM(s) Oral at bedtime  tamsulosin 0.4 milliGRAM(s) Oral at bedtime    MEDICATIONS  (PRN):  acetaminophen     Tablet .. 650 milliGRAM(s) Oral every 6 hours PRN Temp greater or equal to 38C (100.4F), Mild Pain (1 - 3)  aluminum hydroxide/magnesium hydroxide/simethicone Suspension 30 milliLiter(s) Oral every 4 hours PRN Dyspepsia  bisacodyl 5 milliGRAM(s) Oral daily PRN Constipation  melatonin 3 milliGRAM(s) Oral at bedtime PRN Insomnia  ondansetron Injectable 4 milliGRAM(s) IV Push every 8 hours PRN Nausea and/or Vomiting  oxyCODONE    IR 5 milliGRAM(s) Oral every 4 hours PRN Severe Pain (7 - 10)  oxyCODONE    IR 2.5 milliGRAM(s) Oral every 4 hours PRN Moderate Pain (4 - 6)      Objective:    Vitals: Vital Signs Last 24 Hrs  T(C): 36.3 (12-26-23 @ 04:18), Max: 36.7 (12-25-23 @ 20:30)  T(F): 97.4 (12-26-23 @ 04:18), Max: 98 (12-25-23 @ 20:30)  HR: 54 (12-26-23 @ 04:18) (54 - 71)  BP: 114/64 (12-26-23 @ 04:18) (111/58 - 130/71)  BP(mean): --  RR: 18 (12-26-23 @ 04:18) (18 - 18)  SpO2: 97% (12-26-23 @ 04:18) (94% - 98%)                I&O's Summary    25 Dec 2023 07:01  -  26 Dec 2023 07:00  --------------------------------------------------------  IN: 800 mL / OUT: 1500 mL / NET: -700 mL        PHYSICAL EXAM:  GENERAL: NAD  HEAD:  Atraumatic, Normocephalic  EYES: EOMI, conjunctiva and sclera clear  CHEST/LUNG: Clear to auscultation bilaterally; No rales, rhonchi, wheezing, or rubs  HEART: Regular rate and rhythm; No murmurs, rubs, or gallops  ABDOMEN: Soft, Nontender, Nondistended;   SKIN: No rashes or lesions  NERVOUS SYSTEM:  Alert & Oriented X3, no focal deficits    LABS:                        11.0   4.09  )-----------( 254      ( 25 Dec 2023 07:20 )             34.1                         11.6   3.83  )-----------( 257      ( 24 Dec 2023 16:05 )             36.4     12-25    142  |  108  |  7   ----------------------------<  82  3.3<L>   |  24  |  1.00  12-24    138  |  105  |  9   ----------------------------<  116<H>  3.4<L>   |  25  |  1.00    Ca    8.9      25 Dec 2023 07:20  Ca    9.6      24 Dec 2023 16:05  Phos  3.4     12-25  Mg     2.2     12-25    TPro  6.0  /  Alb  3.2<L>  /  TBili  0.3  /  DBili  x   /  AST  24  /  ALT  12  /  AlkPhos  160<H>  12-25  TPro  7.1  /  Alb  3.7  /  TBili  0.4  /  DBili  x   /  AST  29  /  ALT  16  /  AlkPhos  187<H>  12-24    CAPILLARY BLOOD GLUCOSE        PT/INR - ( 25 Dec 2023 07:20 )   PT: 11.3 sec;   INR: 1.08 ratio         PTT - ( 25 Dec 2023 07:20 )  PTT:28.7 sec    Urinalysis Basic - ( 25 Dec 2023 07:20 )    Color: x / Appearance: x / SG: x / pH: x  Gluc: 82 mg/dL / Ketone: x  / Bili: x / Urobili: x   Blood: x / Protein: x / Nitrite: x   Leuk Esterase: x / RBC: x / WBC x   Sq Epi: x / Non Sq Epi: x / Bacteria: x          RADIOLOGY & ADDITIONAL TESTS:    Imaging Personally Reviewed:  [x ] YES  [ ] NO    Consultants involved in case:   Consultant(s) Notes Reviewed:  [ x] YES  [ ] NO:   Care Discussed with Consultants/Other Providers [x ] YES  [ ] NO         ***************************************************************  Jn Lira, PGY1  Internal Medicine   Preferred contact via [employer] Teams   ***************************************************************    WHIT MATTHEWS  83y  MRN: [id number]    Patient is a 83y old  Male who presents with a chief complaint of abdominal pain (25 Dec 2023 13:02)      Interval/Overnight Events: no events ON.     Subjective: Pt seen and examined at bedside. Pt denied any abdominal pain.     MEDICATIONS  (STANDING):  aspirin  chewable 81 milliGRAM(s) Oral daily  chlorhexidine 4% Liquid 1 Application(s) Topical daily  finasteride 5 milliGRAM(s) Oral daily  FLUoxetine 20 milliGRAM(s) Oral daily  influenza  Vaccine (HIGH DOSE) 0.7 milliLiter(s) IntraMuscular once  lactated ringers. 1000 milliLiter(s) (100 mL/Hr) IV Continuous <Continuous>  naloxone Injectable 0.4 milliGRAM(s) IV Push once  pantoprazole    Tablet 40 milliGRAM(s) Oral before breakfast  piperacillin/tazobactam IVPB.. 3.375 Gram(s) IV Intermittent every 8 hours  polyethylene glycol 3350 17 Gram(s) Oral daily  senna 2 Tablet(s) Oral at bedtime  simvastatin 40 milliGRAM(s) Oral at bedtime  tamsulosin 0.4 milliGRAM(s) Oral at bedtime    MEDICATIONS  (PRN):  acetaminophen     Tablet .. 650 milliGRAM(s) Oral every 6 hours PRN Temp greater or equal to 38C (100.4F), Mild Pain (1 - 3)  aluminum hydroxide/magnesium hydroxide/simethicone Suspension 30 milliLiter(s) Oral every 4 hours PRN Dyspepsia  bisacodyl 5 milliGRAM(s) Oral daily PRN Constipation  melatonin 3 milliGRAM(s) Oral at bedtime PRN Insomnia  ondansetron Injectable 4 milliGRAM(s) IV Push every 8 hours PRN Nausea and/or Vomiting  oxyCODONE    IR 5 milliGRAM(s) Oral every 4 hours PRN Severe Pain (7 - 10)  oxyCODONE    IR 2.5 milliGRAM(s) Oral every 4 hours PRN Moderate Pain (4 - 6)      Objective:    Vitals: Vital Signs Last 24 Hrs  T(C): 36.3 (12-26-23 @ 04:18), Max: 36.7 (12-25-23 @ 20:30)  T(F): 97.4 (12-26-23 @ 04:18), Max: 98 (12-25-23 @ 20:30)  HR: 54 (12-26-23 @ 04:18) (54 - 71)  BP: 114/64 (12-26-23 @ 04:18) (111/58 - 130/71)  BP(mean): --  RR: 18 (12-26-23 @ 04:18) (18 - 18)  SpO2: 97% (12-26-23 @ 04:18) (94% - 98%)                I&O's Summary    25 Dec 2023 07:01  -  26 Dec 2023 07:00  --------------------------------------------------------  IN: 800 mL / OUT: 1500 mL / NET: -700 mL        PHYSICAL EXAM:  GENERAL: NAD  HEAD:  Atraumatic, Normocephalic  EYES: EOMI, conjunctiva and sclera clear  CHEST/LUNG: Clear to auscultation bilaterally; No rales, rhonchi, wheezing, or rubs  HEART: Regular rate and rhythm; No murmurs, rubs, or gallops  ABDOMEN: Soft, Nontender, Nondistended;   SKIN: No rashes or lesions  NERVOUS SYSTEM:  Alert & Oriented X3, no focal deficits    LABS:                        11.0   4.09  )-----------( 254      ( 25 Dec 2023 07:20 )             34.1                         11.6   3.83  )-----------( 257      ( 24 Dec 2023 16:05 )             36.4     12-25    142  |  108  |  7   ----------------------------<  82  3.3<L>   |  24  |  1.00  12-24    138  |  105  |  9   ----------------------------<  116<H>  3.4<L>   |  25  |  1.00    Ca    8.9      25 Dec 2023 07:20  Ca    9.6      24 Dec 2023 16:05  Phos  3.4     12-25  Mg     2.2     12-25    TPro  6.0  /  Alb  3.2<L>  /  TBili  0.3  /  DBili  x   /  AST  24  /  ALT  12  /  AlkPhos  160<H>  12-25  TPro  7.1  /  Alb  3.7  /  TBili  0.4  /  DBili  x   /  AST  29  /  ALT  16  /  AlkPhos  187<H>  12-24    CAPILLARY BLOOD GLUCOSE        PT/INR - ( 25 Dec 2023 07:20 )   PT: 11.3 sec;   INR: 1.08 ratio         PTT - ( 25 Dec 2023 07:20 )  PTT:28.7 sec    Urinalysis Basic - ( 25 Dec 2023 07:20 )    Color: x / Appearance: x / SG: x / pH: x  Gluc: 82 mg/dL / Ketone: x  / Bili: x / Urobili: x   Blood: x / Protein: x / Nitrite: x   Leuk Esterase: x / RBC: x / WBC x   Sq Epi: x / Non Sq Epi: x / Bacteria: x          RADIOLOGY & ADDITIONAL TESTS:    Imaging Personally Reviewed:  [x ] YES  [ ] NO    Consultants involved in case:   Consultant(s) Notes Reviewed:  [ x] YES  [ ] NO:   Care Discussed with Consultants/Other Providers [x ] YES  [ ] NO

## 2023-12-26 NOTE — PROGRESS NOTE ADULT - ASSESSMENT
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 Currently low concern for peritonitis via rectal perforation; benign clinical exam and w/o signs of hemodynamic instability.    PLAN:  - Recommend Urology consultation   - Recommend Heme/Onc consultation  - NPO/IVF  - Bowel rest w/ IV abx to increase opportunity for perforation healing  - Monitor clinical/abdominal exam  - Discuss re-scan (w/ rectal contrast) once LLQ pain resolves  - GOALS of CARE need to be discussed in this elderly patient w/ metastatic cancer (DNR/DNI? Diversion surgery would only be palliative)  - Surgery to follow    [facility]  [id number]

## 2023-12-26 NOTE — CONSULT NOTE ADULT - NS ATTEND AMEND GEN_ALL_CORE FT
Patient care and plan discussed and reviewed with Advanced Care Provider. Plan as outlined above edited by me to reflect our discussion.
I have examined the patient,  chart reviewed, lab studies reviewed, relevant imaging studies evaluated. I agree with the plan as outlined above.

## 2023-12-26 NOTE — PROGRESS NOTE ADULT - PROBLEM SELECTOR PLAN 3
h/o s/p pci w stent  no clinft of acs  monitor for chest pain and ekg changes  cont home statin  hold home asa for now  cards consulted by er h/o s/p pci w stent  no clinft of acs  monitor for chest pain and ekg changes  cont home statin  hold home asa for now  cards following would appreciate recs

## 2023-12-27 ENCOUNTER — TRANSCRIPTION ENCOUNTER (OUTPATIENT)
Age: 83
End: 2023-12-27

## 2023-12-27 LAB
-  AMIKACIN: SIGNIFICANT CHANGE UP
-  AMIKACIN: SIGNIFICANT CHANGE UP
-  AZTREONAM: SIGNIFICANT CHANGE UP
-  AZTREONAM: SIGNIFICANT CHANGE UP
-  CEFEPIME: SIGNIFICANT CHANGE UP
-  CEFEPIME: SIGNIFICANT CHANGE UP
-  CEFTAZIDIME: SIGNIFICANT CHANGE UP
-  CEFTAZIDIME: SIGNIFICANT CHANGE UP
-  CIPROFLOXACIN: SIGNIFICANT CHANGE UP
-  CIPROFLOXACIN: SIGNIFICANT CHANGE UP
-  IMIPENEM: SIGNIFICANT CHANGE UP
-  IMIPENEM: SIGNIFICANT CHANGE UP
-  LEVOFLOXACIN: SIGNIFICANT CHANGE UP
-  LEVOFLOXACIN: SIGNIFICANT CHANGE UP
-  MEROPENEM: SIGNIFICANT CHANGE UP
-  MEROPENEM: SIGNIFICANT CHANGE UP
-  PIPERACILLIN/TAZOBACTAM: SIGNIFICANT CHANGE UP
-  PIPERACILLIN/TAZOBACTAM: SIGNIFICANT CHANGE UP
ALBUMIN SERPL ELPH-MCNC: 3.1 G/DL — LOW (ref 3.3–5)
ALBUMIN SERPL ELPH-MCNC: 3.1 G/DL — LOW (ref 3.3–5)
ALP SERPL-CCNC: 128 U/L — HIGH (ref 40–120)
ALP SERPL-CCNC: 128 U/L — HIGH (ref 40–120)
ALT FLD-CCNC: 7 U/L — LOW (ref 10–45)
ALT FLD-CCNC: 7 U/L — LOW (ref 10–45)
ANION GAP SERPL CALC-SCNC: 11 MMOL/L — SIGNIFICANT CHANGE UP (ref 5–17)
ANION GAP SERPL CALC-SCNC: 11 MMOL/L — SIGNIFICANT CHANGE UP (ref 5–17)
AST SERPL-CCNC: 19 U/L — SIGNIFICANT CHANGE UP (ref 10–40)
AST SERPL-CCNC: 19 U/L — SIGNIFICANT CHANGE UP (ref 10–40)
BASOPHILS # BLD AUTO: 0.05 K/UL — SIGNIFICANT CHANGE UP (ref 0–0.2)
BASOPHILS # BLD AUTO: 0.05 K/UL — SIGNIFICANT CHANGE UP (ref 0–0.2)
BASOPHILS NFR BLD AUTO: 1.2 % — SIGNIFICANT CHANGE UP (ref 0–2)
BASOPHILS NFR BLD AUTO: 1.2 % — SIGNIFICANT CHANGE UP (ref 0–2)
BILIRUB SERPL-MCNC: 0.4 MG/DL — SIGNIFICANT CHANGE UP (ref 0.2–1.2)
BILIRUB SERPL-MCNC: 0.4 MG/DL — SIGNIFICANT CHANGE UP (ref 0.2–1.2)
BUN SERPL-MCNC: 9 MG/DL — SIGNIFICANT CHANGE UP (ref 7–23)
BUN SERPL-MCNC: 9 MG/DL — SIGNIFICANT CHANGE UP (ref 7–23)
CALCIUM SERPL-MCNC: 8.3 MG/DL — LOW (ref 8.4–10.5)
CALCIUM SERPL-MCNC: 8.3 MG/DL — LOW (ref 8.4–10.5)
CHLORIDE SERPL-SCNC: 104 MMOL/L — SIGNIFICANT CHANGE UP (ref 96–108)
CHLORIDE SERPL-SCNC: 104 MMOL/L — SIGNIFICANT CHANGE UP (ref 96–108)
CO2 SERPL-SCNC: 22 MMOL/L — SIGNIFICANT CHANGE UP (ref 22–31)
CO2 SERPL-SCNC: 22 MMOL/L — SIGNIFICANT CHANGE UP (ref 22–31)
CREAT SERPL-MCNC: 1.13 MG/DL — SIGNIFICANT CHANGE UP (ref 0.5–1.3)
CREAT SERPL-MCNC: 1.13 MG/DL — SIGNIFICANT CHANGE UP (ref 0.5–1.3)
CULTURE RESULTS: ABNORMAL
CULTURE RESULTS: ABNORMAL
EGFR: 64 ML/MIN/1.73M2 — SIGNIFICANT CHANGE UP
EGFR: 64 ML/MIN/1.73M2 — SIGNIFICANT CHANGE UP
EOSINOPHIL # BLD AUTO: 0.15 K/UL — SIGNIFICANT CHANGE UP (ref 0–0.5)
EOSINOPHIL # BLD AUTO: 0.15 K/UL — SIGNIFICANT CHANGE UP (ref 0–0.5)
EOSINOPHIL NFR BLD AUTO: 3.7 % — SIGNIFICANT CHANGE UP (ref 0–6)
EOSINOPHIL NFR BLD AUTO: 3.7 % — SIGNIFICANT CHANGE UP (ref 0–6)
GLUCOSE SERPL-MCNC: 96 MG/DL — SIGNIFICANT CHANGE UP (ref 70–99)
GLUCOSE SERPL-MCNC: 96 MG/DL — SIGNIFICANT CHANGE UP (ref 70–99)
HCT VFR BLD CALC: 30.1 % — LOW (ref 39–50)
HCT VFR BLD CALC: 30.1 % — LOW (ref 39–50)
HGB BLD-MCNC: 9.7 G/DL — LOW (ref 13–17)
HGB BLD-MCNC: 9.7 G/DL — LOW (ref 13–17)
IMM GRANULOCYTES NFR BLD AUTO: 0.2 % — SIGNIFICANT CHANGE UP (ref 0–0.9)
IMM GRANULOCYTES NFR BLD AUTO: 0.2 % — SIGNIFICANT CHANGE UP (ref 0–0.9)
LYMPHOCYTES # BLD AUTO: 0.99 K/UL — LOW (ref 1–3.3)
LYMPHOCYTES # BLD AUTO: 0.99 K/UL — LOW (ref 1–3.3)
LYMPHOCYTES # BLD AUTO: 24.4 % — SIGNIFICANT CHANGE UP (ref 13–44)
LYMPHOCYTES # BLD AUTO: 24.4 % — SIGNIFICANT CHANGE UP (ref 13–44)
MAGNESIUM SERPL-MCNC: 2.2 MG/DL — SIGNIFICANT CHANGE UP (ref 1.6–2.6)
MAGNESIUM SERPL-MCNC: 2.2 MG/DL — SIGNIFICANT CHANGE UP (ref 1.6–2.6)
MCHC RBC-ENTMCNC: 30.1 PG — SIGNIFICANT CHANGE UP (ref 27–34)
MCHC RBC-ENTMCNC: 30.1 PG — SIGNIFICANT CHANGE UP (ref 27–34)
MCHC RBC-ENTMCNC: 32.2 GM/DL — SIGNIFICANT CHANGE UP (ref 32–36)
MCHC RBC-ENTMCNC: 32.2 GM/DL — SIGNIFICANT CHANGE UP (ref 32–36)
MCV RBC AUTO: 93.5 FL — SIGNIFICANT CHANGE UP (ref 80–100)
MCV RBC AUTO: 93.5 FL — SIGNIFICANT CHANGE UP (ref 80–100)
METHOD TYPE: SIGNIFICANT CHANGE UP
METHOD TYPE: SIGNIFICANT CHANGE UP
MONOCYTES # BLD AUTO: 0.48 K/UL — SIGNIFICANT CHANGE UP (ref 0–0.9)
MONOCYTES # BLD AUTO: 0.48 K/UL — SIGNIFICANT CHANGE UP (ref 0–0.9)
MONOCYTES NFR BLD AUTO: 11.8 % — SIGNIFICANT CHANGE UP (ref 2–14)
MONOCYTES NFR BLD AUTO: 11.8 % — SIGNIFICANT CHANGE UP (ref 2–14)
NEUTROPHILS # BLD AUTO: 2.38 K/UL — SIGNIFICANT CHANGE UP (ref 1.8–7.4)
NEUTROPHILS # BLD AUTO: 2.38 K/UL — SIGNIFICANT CHANGE UP (ref 1.8–7.4)
NEUTROPHILS NFR BLD AUTO: 58.7 % — SIGNIFICANT CHANGE UP (ref 43–77)
NEUTROPHILS NFR BLD AUTO: 58.7 % — SIGNIFICANT CHANGE UP (ref 43–77)
NRBC # BLD: 0 /100 WBCS — SIGNIFICANT CHANGE UP (ref 0–0)
NRBC # BLD: 0 /100 WBCS — SIGNIFICANT CHANGE UP (ref 0–0)
ORGANISM # SPEC MICROSCOPIC CNT: ABNORMAL
PHOSPHATE SERPL-MCNC: 3.5 MG/DL — SIGNIFICANT CHANGE UP (ref 2.5–4.5)
PHOSPHATE SERPL-MCNC: 3.5 MG/DL — SIGNIFICANT CHANGE UP (ref 2.5–4.5)
PLATELET # BLD AUTO: 230 K/UL — SIGNIFICANT CHANGE UP (ref 150–400)
PLATELET # BLD AUTO: 230 K/UL — SIGNIFICANT CHANGE UP (ref 150–400)
POTASSIUM SERPL-MCNC: 3.7 MMOL/L — SIGNIFICANT CHANGE UP (ref 3.5–5.3)
POTASSIUM SERPL-MCNC: 3.7 MMOL/L — SIGNIFICANT CHANGE UP (ref 3.5–5.3)
POTASSIUM SERPL-SCNC: 3.7 MMOL/L — SIGNIFICANT CHANGE UP (ref 3.5–5.3)
POTASSIUM SERPL-SCNC: 3.7 MMOL/L — SIGNIFICANT CHANGE UP (ref 3.5–5.3)
PROT SERPL-MCNC: 5.6 G/DL — LOW (ref 6–8.3)
PROT SERPL-MCNC: 5.6 G/DL — LOW (ref 6–8.3)
RBC # BLD: 3.22 M/UL — LOW (ref 4.2–5.8)
RBC # BLD: 3.22 M/UL — LOW (ref 4.2–5.8)
RBC # FLD: 16.4 % — HIGH (ref 10.3–14.5)
RBC # FLD: 16.4 % — HIGH (ref 10.3–14.5)
SODIUM SERPL-SCNC: 137 MMOL/L — SIGNIFICANT CHANGE UP (ref 135–145)
SODIUM SERPL-SCNC: 137 MMOL/L — SIGNIFICANT CHANGE UP (ref 135–145)
SPECIMEN SOURCE: SIGNIFICANT CHANGE UP
SPECIMEN SOURCE: SIGNIFICANT CHANGE UP
WBC # BLD: 4.06 K/UL — SIGNIFICANT CHANGE UP (ref 3.8–10.5)
WBC # BLD: 4.06 K/UL — SIGNIFICANT CHANGE UP (ref 3.8–10.5)
WBC # FLD AUTO: 4.06 K/UL — SIGNIFICANT CHANGE UP (ref 3.8–10.5)
WBC # FLD AUTO: 4.06 K/UL — SIGNIFICANT CHANGE UP (ref 3.8–10.5)

## 2023-12-27 PROCEDURE — 93010 ELECTROCARDIOGRAM REPORT: CPT

## 2023-12-27 PROCEDURE — 99239 HOSP IP/OBS DSCHRG MGMT >30: CPT | Mod: GC

## 2023-12-27 RX ORDER — HEPARIN SODIUM 5000 [USP'U]/ML
5000 INJECTION INTRAVENOUS; SUBCUTANEOUS EVERY 12 HOURS
Refills: 0 | Status: DISCONTINUED | OUTPATIENT
Start: 2023-12-27 | End: 2023-12-28

## 2023-12-27 RX ORDER — CIPROFLOXACIN LACTATE 400MG/40ML
2 VIAL (ML) INTRAVENOUS
Qty: 6 | Refills: 0
Start: 2023-12-27 | End: 2023-12-29

## 2023-12-27 RX ORDER — BICALUTAMIDE 50 MG/1
1 TABLET, FILM COATED ORAL
Refills: 0 | DISCHARGE

## 2023-12-27 RX ORDER — ABIRATERONE ACETATE 250 MG/1
4 TABLET ORAL
Refills: 0 | DISCHARGE

## 2023-12-27 RX ORDER — ZOSTER VACCINE RECOMBINANT, ADJUVANTED 50 MCG/0.5
0.5 KIT INTRAMUSCULAR ONCE
Refills: 0 | Status: DISCONTINUED | OUTPATIENT
Start: 2023-12-27 | End: 2023-12-28

## 2023-12-27 RX ADMIN — PANTOPRAZOLE SODIUM 40 MILLIGRAM(S): 20 TABLET, DELAYED RELEASE ORAL at 06:02

## 2023-12-27 RX ADMIN — PIPERACILLIN AND TAZOBACTAM 25 GRAM(S): 4; .5 INJECTION, POWDER, LYOPHILIZED, FOR SOLUTION INTRAVENOUS at 13:18

## 2023-12-27 RX ADMIN — TAMSULOSIN HYDROCHLORIDE 0.4 MILLIGRAM(S): 0.4 CAPSULE ORAL at 21:42

## 2023-12-27 RX ADMIN — FINASTERIDE 5 MILLIGRAM(S): 5 TABLET, FILM COATED ORAL at 12:12

## 2023-12-27 RX ADMIN — SENNA PLUS 2 TABLET(S): 8.6 TABLET ORAL at 21:39

## 2023-12-27 RX ADMIN — PIPERACILLIN AND TAZOBACTAM 25 GRAM(S): 4; .5 INJECTION, POWDER, LYOPHILIZED, FOR SOLUTION INTRAVENOUS at 21:38

## 2023-12-27 RX ADMIN — POLYETHYLENE GLYCOL 3350 17 GRAM(S): 17 POWDER, FOR SOLUTION ORAL at 12:12

## 2023-12-27 RX ADMIN — SODIUM CHLORIDE 100 MILLILITER(S): 9 INJECTION, SOLUTION INTRAVENOUS at 01:49

## 2023-12-27 RX ADMIN — Medication 20 MILLIGRAM(S): at 12:12

## 2023-12-27 RX ADMIN — PIPERACILLIN AND TAZOBACTAM 25 GRAM(S): 4; .5 INJECTION, POWDER, LYOPHILIZED, FOR SOLUTION INTRAVENOUS at 06:02

## 2023-12-27 RX ADMIN — HEPARIN SODIUM 5000 UNIT(S): 5000 INJECTION INTRAVENOUS; SUBCUTANEOUS at 17:54

## 2023-12-27 RX ADMIN — CHLORHEXIDINE GLUCONATE 1 APPLICATION(S): 213 SOLUTION TOPICAL at 12:13

## 2023-12-27 RX ADMIN — SIMVASTATIN 40 MILLIGRAM(S): 20 TABLET, FILM COATED ORAL at 21:42

## 2023-12-27 NOTE — DIETITIAN INITIAL EVALUATION ADULT - SIGNS/SYMPTOMS
pt w/ >20% wt loss x1yr, <75% PO >/= 3 months, muscle/fat depletion, noted underweight w/ BMI 17.8 pt w/ metastatic prostate cancer

## 2023-12-27 NOTE — CHART NOTE - NSCHARTNOTEFT_GEN_A_CORE
Case discussed with primary team attending, no indication for palliative care consultation at this time. Further insight from onc can be beneficial regarding GOC discussion. As plan is for d/c likely tomorrow can follow up outpatient. Can be reached by TEAMS M-F 9-5 Maame Whitaker Any other time please page [phone] if needed Case discussed with primary team attending, no indication for palliative care consultation at this time. Further insight from onc can be beneficial regarding GOC discussion. As plan is for d/c likely tomorrow can follow up outpatient. Can be reached by TEAMS M-F 9-5 Maame Whitaker Any other time please page [phone] if needed

## 2023-12-27 NOTE — DIETITIAN INITIAL EVALUATION ADULT - NUTRITIONGOAL OUTCOME2
- patient will consume >75% of meals during hospital admission to help adequately meet nutritional needs

## 2023-12-27 NOTE — PROGRESS NOTE ADULT - ASSESSMENT
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 Currently low concern for peritonitis via rectal perforation; benign clinical exam and w/o signs of hemodynamic instability. CT shows resolution of air in rectum.     PLAN:  - No acute surgical intervention at this time based on repeat CT scan  - Urology: no immediate intervention, f/u cultures, c/w browne, f/u outpatient  - Recommend Heme/Onc consultation  - Surgery to follow    [facility]  [id number]

## 2023-12-27 NOTE — DIETITIAN INITIAL EVALUATION ADULT - REASON
Patient meets criteria for malnutrition through PO intake and weight loss parameters, significant muscle/fat depletion observed by clinical physical observation

## 2023-12-27 NOTE — PROGRESS NOTE ADULT - PROBLEM SELECTOR PLAN 3
h/o s/p pci w stent  no clinft of acs  monitor for chest pain and ekg changes  cont home statin  hold home asa for now  cards following would appreciate recs ctm

## 2023-12-27 NOTE — DISCHARGE NOTE NURSING/CASE MANAGEMENT/SOCIAL WORK - NSDCPEFALRISK_GEN_ALL_CORE
For information on Fall & Injury Prevention, visit: [email or web address] OR  [email or web address] OR  https://www.cdc.gov/steadi/patient.html For information on Fall & Injury Prevention, visit: [email or web address] OR  [email or web address] OR  https://www.cdc.gov/steadi/patient.html

## 2023-12-27 NOTE — PROGRESS NOTE ADULT - PROBLEM SELECTOR PLAN 1
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 Marked mid/lower rectal wall thickening, hypervascularity, and extraluminal air extending through the anal sphincter complex down to the anal verge. Given the absence of recent procedure in this location, findings are concerning for rectal perforation."  s/p zosyn in ER  Monitor for fever, worsening white count  serial abdominal exams, serial abdominal imaging as needed  npo/bowel rest for now; ivf + lytes as needed in the mean time  supportive care with analgesics, antiemetics, antipyretics as needed  empirical abx w zosyn for now   -CRS consulted recs appreciated-> ordered follow up imaging to evaluate for rectal perf ua shows active sediment w large LE, pyuria, nitrites, bacteriuria; suggestive of uti  imaging shows "urinary bladder cystitis and ascending urinary tract infection. No CT evidence of pyelonephritis. No obstructing ureteral calculus or hydronephrosis."  browne in place  s/p zosyn in er  follow up urine cultures  empirical abx w zosyn for now; deescalate based on final urine c+s

## 2023-12-27 NOTE — PROGRESS NOTE ADULT - SUBJECTIVE AND OBJECTIVE BOX
SURGERY DAILY PROGRESS NOTE    --------------- OVERNIGHT/INTERVAL---------------  - No acute events overnight    --------------- SUBJECTIVE ---------------  - Patient seen and examined at bedside with surgical team    --------------- OBJECTIVE ---------------  -----VITALS-----  T(C): 36.8, Max: 36.8 (12-27)  T(F): 98.3, Max: 98.3 (12-27)  HR: 85 (56 - 85)  BP: 113/59 (95/53 - 113/59)  BP(mean): --  ABP: --  ABP(mean): --  RR: 18 (18 - 20)  SpO2: 95% (95% - 97%)  CVP(mm Hg): --  CVP(cm H2O): --  room air            -----PHYSICAL EXAM-----  GENERAL: NAD, lying in bed   NEURO: AOx3, awake alert appropriate  HEENT: NCAT, trachea midline  PULM: Respirations non-labored  ABD: Soft, non-tender, non-distended, no peritonitis/rebound tenderness  EXT: Warm, well perfused    -----DRAINS-----  ANGELA:      Chest Tube:      NG Tube:     -----INs & OUTs-----      -----MEDICATIONS-----  STANDING  aspirin  chewable 81 milliGRAM(s) Oral daily  chlorhexidine 4% Liquid 1 Application(s) Topical daily  finasteride 5 milliGRAM(s) Oral daily  FLUoxetine 20 milliGRAM(s) Oral daily  influenza  Vaccine (HIGH DOSE) 0.7 milliLiter(s) IntraMuscular once  lactated ringers. 1000 milliLiter(s) (100 mL/Hr) IV Continuous <Continuous>  naloxone Injectable 0.4 milliGRAM(s) IV Push once  pantoprazole    Tablet 40 milliGRAM(s) Oral before breakfast  piperacillin/tazobactam IVPB.. 3.375 Gram(s) IV Intermittent every 8 hours  polyethylene glycol 3350 17 Gram(s) Oral daily  senna 2 Tablet(s) Oral at bedtime  simvastatin 40 milliGRAM(s) Oral at bedtime  tamsulosin 0.4 milliGRAM(s) Oral at bedtime    PRN  acetaminophen     Tablet .. 650 milliGRAM(s) Oral every 6 hours PRN Temp greater or equal to 38C (100.4F), Mild Pain (1 - 3)  aluminum hydroxide/magnesium hydroxide/simethicone Suspension 30 milliLiter(s) Oral every 4 hours PRN Dyspepsia  bisacodyl 5 milliGRAM(s) Oral daily PRN Constipation  melatonin 3 milliGRAM(s) Oral at bedtime PRN Insomnia  ondansetron Injectable 4 milliGRAM(s) IV Push every 8 hours PRN Nausea and/or Vomiting  oxyCODONE    IR 2.5 milliGRAM(s) Oral every 4 hours PRN Moderate Pain (4 - 6)  oxyCODONE    IR 5 milliGRAM(s) Oral every 4 hours PRN Severe Pain (7 - 10)    -----LABS-----  CBC (12-26 @ 07:03)                              10.4<L>                         4.31    )----------------(  241        64.3  % Neutrophils, 22.0  % Lymphocytes, ANC: 2.77                                32.4<L>    BMP (12-26 @ 07:08)             140     |  105     |  8     		Ca++ --      Ca 8.7                ---------------------------------( 71    		Mg 1.8                4.0     |  23      |  1.05  			Ph 3.3       LFTs (12-26 @ 07:08)      TPro 5.7<L> / Alb 3.1<L> / TBili 0.4 / DBili -- / AST 21 / ALT 11 / AlkPhos 138<H>            Urinalysis (12-26 @ 07:08):     Color:  / Appearance:  / SG:  / pH:  / Gluc: 71 / Ketones:  / Bili:  / Urobili:  / Protein : / Nitrites:  / Leuk.Est:  / RBC:  / WBC:  / Sq Epi:  / Non Sq Epi:  / Bacteria        -> Clean Catch Clean Catch (Midstream) Culture (12-24 @ 16:05)     NG    NG    >100,000 CFU/ml Pseudomonas aeruginosa<!>    -> .Blood Blood-Peripheral Culture (12-24 @ 15:05)     NG    NG    No growth at 48 Hours    -> .Blood Blood-Peripheral Culture (12-24 @ 14:50)     NG    NG    No growth at 48 Hours

## 2023-12-27 NOTE — DIETITIAN INITIAL EVALUATION ADULT - ETIOLOGY
hypermetabolic demand for nutrients 2/2 chronic illness chronic illness (metastatic prostate cancer)

## 2023-12-27 NOTE — DIETITIAN INITIAL EVALUATION ADULT - ORAL INTAKE PTA/DIET HISTORY
Patient endorses decreased PO intake/appetite PTA accompanied w/ unintentional weight loss. Denied chewing/swallowing impairment (shows being edentulous on bottom half of oral cavity, has bridge on upper half, is set to have lower bridge put in soon per patient), no nausea/vomiting. Reports having support from his son at home for grocery shopping and cooking.

## 2023-12-27 NOTE — PROGRESS NOTE ADULT - SUBJECTIVE AND OBJECTIVE BOX
***************************************************************  Jn Lira, PGY1  Internal Medicine   Preferred contact via [employer] Teams   ***************************************************************    WHIT MATTHEWS  83y  MRN: [id number]    Patient is a 83y old  Male who presents with a chief complaint of abdominal pain (26 Dec 2023 16:15)      Interval/Overnight Events: no events ON.     Subjective: Pt seen and examined at bedside. Denies fever, CP, SOB, abn pain, N/V, dysuria. Tolerating diet.      MEDICATIONS  (STANDING):  aspirin  chewable 81 milliGRAM(s) Oral daily  chlorhexidine 4% Liquid 1 Application(s) Topical daily  finasteride 5 milliGRAM(s) Oral daily  FLUoxetine 20 milliGRAM(s) Oral daily  influenza  Vaccine (HIGH DOSE) 0.7 milliLiter(s) IntraMuscular once  lactated ringers. 1000 milliLiter(s) (100 mL/Hr) IV Continuous <Continuous>  naloxone Injectable 0.4 milliGRAM(s) IV Push once  pantoprazole    Tablet 40 milliGRAM(s) Oral before breakfast  piperacillin/tazobactam IVPB.. 3.375 Gram(s) IV Intermittent every 8 hours  polyethylene glycol 3350 17 Gram(s) Oral daily  senna 2 Tablet(s) Oral at bedtime  simvastatin 40 milliGRAM(s) Oral at bedtime  tamsulosin 0.4 milliGRAM(s) Oral at bedtime    MEDICATIONS  (PRN):  acetaminophen     Tablet .. 650 milliGRAM(s) Oral every 6 hours PRN Temp greater or equal to 38C (100.4F), Mild Pain (1 - 3)  aluminum hydroxide/magnesium hydroxide/simethicone Suspension 30 milliLiter(s) Oral every 4 hours PRN Dyspepsia  bisacodyl 5 milliGRAM(s) Oral daily PRN Constipation  melatonin 3 milliGRAM(s) Oral at bedtime PRN Insomnia  ondansetron Injectable 4 milliGRAM(s) IV Push every 8 hours PRN Nausea and/or Vomiting  oxyCODONE    IR 5 milliGRAM(s) Oral every 4 hours PRN Severe Pain (7 - 10)  oxyCODONE    IR 2.5 milliGRAM(s) Oral every 4 hours PRN Moderate Pain (4 - 6)      Objective:    Vitals: Vital Signs Last 24 Hrs  T(C): 36.8 (12-27-23 @ 06:45), Max: 36.8 (12-27-23 @ 06:45)  T(F): 98.3 (12-27-23 @ 06:45), Max: 98.3 (12-27-23 @ 06:45)  HR: 85 (12-27-23 @ 06:45) (56 - 85)  BP: 113/59 (12-27-23 @ 06:45) (95/53 - 113/59)  BP(mean): --  RR: 18 (12-27-23 @ 06:45) (18 - 20)  SpO2: 95% (12-27-23 @ 06:45) (95% - 97%)                I&O's Summary    26 Dec 2023 07:01  -  27 Dec 2023 07:00  --------------------------------------------------------  IN: 0 mL / OUT: 700 mL / NET: -700 mL        PHYSICAL EXAM:  GENERAL: NAD  HEAD:  Atraumatic, Normocephalic  EYES: EOMI, conjunctiva and sclera clear  CHEST/LUNG: Clear to auscultation bilaterally; No rales, rhonchi, wheezing, or rubs  HEART: Regular rate and rhythm; No murmurs, rubs, or gallops  ABDOMEN: Soft, Nontender, Nondistended;   SKIN: No rashes or lesions  NERVOUS SYSTEM:  Alert & Oriented X3, no focal deficits    LABS:                        10.4   4.31  )-----------( 241      ( 26 Dec 2023 07:03 )             32.4                         11.0   4.09  )-----------( 254      ( 25 Dec 2023 07:20 )             34.1                         11.6   3.83  )-----------( 257      ( 24 Dec 2023 16:05 )             36.4     12-26    140  |  105  |  8   ----------------------------<  71  4.0   |  23  |  1.05  12-25    142  |  108  |  7   ----------------------------<  82  3.3<L>   |  24  |  1.00  12-24    138  |  105  |  9   ----------------------------<  116<H>  3.4<L>   |  25  |  1.00    Ca    8.7      26 Dec 2023 07:08  Ca    8.9      25 Dec 2023 07:20  Ca    9.6      24 Dec 2023 16:05  Phos  3.3     12-26  Mg     1.8     12-26    TPro  5.7<L>  /  Alb  3.1<L>  /  TBili  0.4  /  DBili  x   /  AST  21  /  ALT  11  /  AlkPhos  138<H>  12-26  TPro  6.0  /  Alb  3.2<L>  /  TBili  0.3  /  DBili  x   /  AST  24  /  ALT  12  /  AlkPhos  160<H>  12-25  TPro  7.1  /  Alb  3.7  /  TBili  0.4  /  DBili  x   /  AST  29  /  ALT  16  /  AlkPhos  187<H>  12-24    CAPILLARY BLOOD GLUCOSE        PT/INR - ( 25 Dec 2023 07:20 )   PT: 11.3 sec;   INR: 1.08 ratio         PTT - ( 25 Dec 2023 07:20 )  PTT:28.7 sec    Urinalysis Basic - ( 26 Dec 2023 07:08 )    Color: x / Appearance: x / SG: x / pH: x  Gluc: 71 mg/dL / Ketone: x  / Bili: x / Urobili: x   Blood: x / Protein: x / Nitrite: x   Leuk Esterase: x / RBC: x / WBC x   Sq Epi: x / Non Sq Epi: x / Bacteria: x          RADIOLOGY & ADDITIONAL TESTS:    Imaging Personally Reviewed:  [x ] YES  [ ] NO    Consultants involved in case:   Consultant(s) Notes Reviewed:  [ x] YES  [ ] NO:   Care Discussed with Consultants/Other Providers [x ] YES  [ ] NO         ***************************************************************  Jn Lira, PGY1  Internal Medicine   Preferred contact via [employer] Teams   ***************************************************************    WHIT MATTHEWS  83y  MRN: [id number]    Patient is a 83y old  Male who presents with a chief complaint of abdominal pain (26 Dec 2023 16:15)      Interval/Overnight Events: no events ON.     Subjective: Pt seen and examined at bedside. Denies fever, CP, SOB, abn pain, N/V, dysuria. Tolerating diet.      MEDICATIONS  (STANDING):  aspirin  chewable 81 milliGRAM(s) Oral daily  chlorhexidine 4% Liquid 1 Application(s) Topical daily  finasteride 5 milliGRAM(s) Oral daily  FLUoxetine 20 milliGRAM(s) Oral daily  influenza  Vaccine (HIGH DOSE) 0.7 milliLiter(s) IntraMuscular once  lactated ringers. 1000 milliLiter(s) (100 mL/Hr) IV Continuous <Continuous>  naloxone Injectable 0.4 milliGRAM(s) IV Push once  pantoprazole    Tablet 40 milliGRAM(s) Oral before breakfast  piperacillin/tazobactam IVPB.. 3.375 Gram(s) IV Intermittent every 8 hours  polyethylene glycol 3350 17 Gram(s) Oral daily  senna 2 Tablet(s) Oral at bedtime  simvastatin 40 milliGRAM(s) Oral at bedtime  tamsulosin 0.4 milliGRAM(s) Oral at bedtime    MEDICATIONS  (PRN):  acetaminophen     Tablet .. 650 milliGRAM(s) Oral every 6 hours PRN Temp greater or equal to 38C (100.4F), Mild Pain (1 - 3)  aluminum hydroxide/magnesium hydroxide/simethicone Suspension 30 milliLiter(s) Oral every 4 hours PRN Dyspepsia  bisacodyl 5 milliGRAM(s) Oral daily PRN Constipation  melatonin 3 milliGRAM(s) Oral at bedtime PRN Insomnia  ondansetron Injectable 4 milliGRAM(s) IV Push every 8 hours PRN Nausea and/or Vomiting  oxyCODONE    IR 5 milliGRAM(s) Oral every 4 hours PRN Severe Pain (7 - 10)  oxyCODONE    IR 2.5 milliGRAM(s) Oral every 4 hours PRN Moderate Pain (4 - 6)      Objective:    Vitals: Vital Signs Last 24 Hrs  T(C): 36.8 (12-27-23 @ 06:45), Max: 36.8 (12-27-23 @ 06:45)  T(F): 98.3 (12-27-23 @ 06:45), Max: 98.3 (12-27-23 @ 06:45)  HR: 85 (12-27-23 @ 06:45) (56 - 85)  BP: 113/59 (12-27-23 @ 06:45) (95/53 - 113/59)  BP(mean): --  RR: 18 (12-27-23 @ 06:45) (18 - 20)  SpO2: 95% (12-27-23 @ 06:45) (95% - 97%)                I&O's Summary    26 Dec 2023 07:01  -  27 Dec 2023 07:00  --------------------------------------------------------  IN: 0 mL / OUT: 700 mL / NET: -700 mL        PHYSICAL EXAM:  GENERAL: NAD  HEAD:  Atraumatic, Normocephalic  EYES: EOMI, conjunctiva and sclera clear  CHEST/LUNG: Clear to auscultation bilaterally; No rales, rhonchi, wheezing, or rubs  HEART: Regular rate and rhythm; No murmurs, rubs, or gallops  ABDOMEN: Soft, Nontender, Nondistended;   SKIN: No rashes or lesions  NERVOUS SYSTEM:  Alert & Oriented X3, no focal deficits    LABS:                        10.4   4.31  )-----------( 241      ( 26 Dec 2023 07:03 )             32.4                         11.0   4.09  )-----------( 254      ( 25 Dec 2023 07:20 )             34.1                         11.6   3.83  )-----------( 257      ( 24 Dec 2023 16:05 )             36.4     12-26    140  |  105  |  8   ----------------------------<  71  4.0   |  23  |  1.05  12-25    142  |  108  |  7   ----------------------------<  82  3.3<L>   |  24  |  1.00  12-24    138  |  105  |  9   ----------------------------<  116<H>  3.4<L>   |  25  |  1.00    Ca    8.7      26 Dec 2023 07:08  Ca    8.9      25 Dec 2023 07:20  Ca    9.6      24 Dec 2023 16:05  Phos  3.3     12-26  Mg     1.8     12-26    TPro  5.7<L>  /  Alb  3.1<L>  /  TBili  0.4  /  DBili  x   /  AST  21  /  ALT  11  /  AlkPhos  138<H>  12-26  TPro  6.0  /  Alb  3.2<L>  /  TBili  0.3  /  DBili  x   /  AST  24  /  ALT  12  /  AlkPhos  160<H>  12-25  TPro  7.1  /  Alb  3.7  /  TBili  0.4  /  DBili  x   /  AST  29  /  ALT  16  /  AlkPhos  187<H>  12-24    CAPILLARY BLOOD GLUCOSE        PT/INR - ( 25 Dec 2023 07:20 )   PT: 11.3 sec;   INR: 1.08 ratio         PTT - ( 25 Dec 2023 07:20 )  PTT:28.7 sec    Urinalysis Basic - ( 26 Dec 2023 07:08 )    Color: x / Appearance: x / SG: x / pH: x  Gluc: 71 mg/dL / Ketone: x  / Bili: x / Urobili: x   Blood: x / Protein: x / Nitrite: x   Leuk Esterase: x / RBC: x / WBC x   Sq Epi: x / Non Sq Epi: x / Bacteria: x          RADIOLOGY & ADDITIONAL TESTS:    Imaging Personally Reviewed:  [x ] YES  [ ] NO    Consultants involved in case:   Consultant(s) Notes Reviewed:  [ x] YES  [ ] NO:   Care Discussed with Consultants/Other Providers [x ] YES  [ ] NO         ***************************************************************  Jn Lira, PGY1  Internal Medicine   Preferred contact via [employer] Teams   ***************************************************************    WHIT MATTHEWS  83y  MRN: [id number]    Patient is a 83y old  Male who presents with a chief complaint of abdominal pain (26 Dec 2023 16:15)      Interval/Overnight Events: no events ON.     Subjective: Pt seen and examined at bedside. Pt pleasant resting calmly.     MEDICATIONS  (STANDING):  aspirin  chewable 81 milliGRAM(s) Oral daily  chlorhexidine 4% Liquid 1 Application(s) Topical daily  finasteride 5 milliGRAM(s) Oral daily  FLUoxetine 20 milliGRAM(s) Oral daily  influenza  Vaccine (HIGH DOSE) 0.7 milliLiter(s) IntraMuscular once  lactated ringers. 1000 milliLiter(s) (100 mL/Hr) IV Continuous <Continuous>  naloxone Injectable 0.4 milliGRAM(s) IV Push once  pantoprazole    Tablet 40 milliGRAM(s) Oral before breakfast  piperacillin/tazobactam IVPB.. 3.375 Gram(s) IV Intermittent every 8 hours  polyethylene glycol 3350 17 Gram(s) Oral daily  senna 2 Tablet(s) Oral at bedtime  simvastatin 40 milliGRAM(s) Oral at bedtime  tamsulosin 0.4 milliGRAM(s) Oral at bedtime    MEDICATIONS  (PRN):  acetaminophen     Tablet .. 650 milliGRAM(s) Oral every 6 hours PRN Temp greater or equal to 38C (100.4F), Mild Pain (1 - 3)  aluminum hydroxide/magnesium hydroxide/simethicone Suspension 30 milliLiter(s) Oral every 4 hours PRN Dyspepsia  bisacodyl 5 milliGRAM(s) Oral daily PRN Constipation  melatonin 3 milliGRAM(s) Oral at bedtime PRN Insomnia  ondansetron Injectable 4 milliGRAM(s) IV Push every 8 hours PRN Nausea and/or Vomiting  oxyCODONE    IR 5 milliGRAM(s) Oral every 4 hours PRN Severe Pain (7 - 10)  oxyCODONE    IR 2.5 milliGRAM(s) Oral every 4 hours PRN Moderate Pain (4 - 6)      Objective:    Vitals: Vital Signs Last 24 Hrs  T(C): 36.8 (12-27-23 @ 06:45), Max: 36.8 (12-27-23 @ 06:45)  T(F): 98.3 (12-27-23 @ 06:45), Max: 98.3 (12-27-23 @ 06:45)  HR: 85 (12-27-23 @ 06:45) (56 - 85)  BP: 113/59 (12-27-23 @ 06:45) (95/53 - 113/59)  BP(mean): --  RR: 18 (12-27-23 @ 06:45) (18 - 20)  SpO2: 95% (12-27-23 @ 06:45) (95% - 97%)                I&O's Summary    26 Dec 2023 07:01  -  27 Dec 2023 07:00  --------------------------------------------------------  IN: 0 mL / OUT: 700 mL / NET: -700 mL        PHYSICAL EXAM:  GENERAL: NAD  HEAD:  Atraumatic, Normocephalic  EYES: EOMI, conjunctiva and sclera clear  CHEST/LUNG: Clear to auscultation bilaterally; No rales, rhonchi, wheezing, or rubs  HEART: Regular rate and rhythm; No murmurs, rubs, or gallops  ABDOMEN: Soft, Nontender, Nondistended;   SKIN: No rashes or lesions  NERVOUS SYSTEM:  Alert & Oriented X3, no focal deficits    LABS:                        10.4   4.31  )-----------( 241      ( 26 Dec 2023 07:03 )             32.4                         11.0   4.09  )-----------( 254      ( 25 Dec 2023 07:20 )             34.1                         11.6   3.83  )-----------( 257      ( 24 Dec 2023 16:05 )             36.4     12-26    140  |  105  |  8   ----------------------------<  71  4.0   |  23  |  1.05  12-25    142  |  108  |  7   ----------------------------<  82  3.3<L>   |  24  |  1.00  12-24    138  |  105  |  9   ----------------------------<  116<H>  3.4<L>   |  25  |  1.00    Ca    8.7      26 Dec 2023 07:08  Ca    8.9      25 Dec 2023 07:20  Ca    9.6      24 Dec 2023 16:05  Phos  3.3     12-26  Mg     1.8     12-26    TPro  5.7<L>  /  Alb  3.1<L>  /  TBili  0.4  /  DBili  x   /  AST  21  /  ALT  11  /  AlkPhos  138<H>  12-26  TPro  6.0  /  Alb  3.2<L>  /  TBili  0.3  /  DBili  x   /  AST  24  /  ALT  12  /  AlkPhos  160<H>  12-25  TPro  7.1  /  Alb  3.7  /  TBili  0.4  /  DBili  x   /  AST  29  /  ALT  16  /  AlkPhos  187<H>  12-24    CAPILLARY BLOOD GLUCOSE        PT/INR - ( 25 Dec 2023 07:20 )   PT: 11.3 sec;   INR: 1.08 ratio         PTT - ( 25 Dec 2023 07:20 )  PTT:28.7 sec    Urinalysis Basic - ( 26 Dec 2023 07:08 )    Color: x / Appearance: x / SG: x / pH: x  Gluc: 71 mg/dL / Ketone: x  / Bili: x / Urobili: x   Blood: x / Protein: x / Nitrite: x   Leuk Esterase: x / RBC: x / WBC x   Sq Epi: x / Non Sq Epi: x / Bacteria: x          RADIOLOGY & ADDITIONAL TESTS:    Imaging Personally Reviewed:  [x ] YES  [ ] NO    Consultants involved in case:   Consultant(s) Notes Reviewed:  [ x] YES  [ ] NO:   Care Discussed with Consultants/Other Providers [x ] YES  [ ] NO         ***************************************************************  Jn Lira, PGY1  Internal Medicine   Preferred contact via [employer] Teams   ***************************************************************    WHIT MATTHEWS  83y  MRN: [id number]    Patient is a 83y old  Male who presents with a chief complaint of abdominal pain (26 Dec 2023 16:15)      Interval/Overnight Events: no events ON.     Subjective: Pt seen and examined at bedside. Pt pleasant resting calmly.     MEDICATIONS  (STANDING):  aspirin  chewable 81 milliGRAM(s) Oral daily  chlorhexidine 4% Liquid 1 Application(s) Topical daily  finasteride 5 milliGRAM(s) Oral daily  FLUoxetine 20 milliGRAM(s) Oral daily  influenza  Vaccine (HIGH DOSE) 0.7 milliLiter(s) IntraMuscular once  lactated ringers. 1000 milliLiter(s) (100 mL/Hr) IV Continuous <Continuous>  naloxone Injectable 0.4 milliGRAM(s) IV Push once  pantoprazole    Tablet 40 milliGRAM(s) Oral before breakfast  piperacillin/tazobactam IVPB.. 3.375 Gram(s) IV Intermittent every 8 hours  polyethylene glycol 3350 17 Gram(s) Oral daily  senna 2 Tablet(s) Oral at bedtime  simvastatin 40 milliGRAM(s) Oral at bedtime  tamsulosin 0.4 milliGRAM(s) Oral at bedtime    MEDICATIONS  (PRN):  acetaminophen     Tablet .. 650 milliGRAM(s) Oral every 6 hours PRN Temp greater or equal to 38C (100.4F), Mild Pain (1 - 3)  aluminum hydroxide/magnesium hydroxide/simethicone Suspension 30 milliLiter(s) Oral every 4 hours PRN Dyspepsia  bisacodyl 5 milliGRAM(s) Oral daily PRN Constipation  melatonin 3 milliGRAM(s) Oral at bedtime PRN Insomnia  ondansetron Injectable 4 milliGRAM(s) IV Push every 8 hours PRN Nausea and/or Vomiting  oxyCODONE    IR 5 milliGRAM(s) Oral every 4 hours PRN Severe Pain (7 - 10)  oxyCODONE    IR 2.5 milliGRAM(s) Oral every 4 hours PRN Moderate Pain (4 - 6)      Objective:    Vitals: Vital Signs Last 24 Hrs  T(C): 36.8 (12-27-23 @ 06:45), Max: 36.8 (12-27-23 @ 06:45)  T(F): 98.3 (12-27-23 @ 06:45), Max: 98.3 (12-27-23 @ 06:45)  HR: 85 (12-27-23 @ 06:45) (56 - 85)  BP: 113/59 (12-27-23 @ 06:45) (95/53 - 113/59)  BP(mean): --  RR: 18 (12-27-23 @ 06:45) (18 - 20)  SpO2: 95% (12-27-23 @ 06:45) (95% - 97%)                I&O's Summary    26 Dec 2023 07:01  -  27 Dec 2023 07:00  --------------------------------------------------------  IN: 0 mL / OUT: 700 mL / NET: -700 mL        PHYSICAL EXAM:  GENERAL: NAD  HEAD:  Atraumatic, Normocephalic  EYES: EOMI, conjunctiva and sclera clear  CHEST/LUNG: Clear to auscultation bilaterally; No rales, rhonchi, wheezing, or rubs  HEART: Regular rate and rhythm; No murmurs, rubs, or gallops  ABDOMEN: Soft, Nontender, Nondistended;   SKIN: No rashes or lesions  NERVOUS SYSTEM:  Alert & Oriented X3, no focal deficits    LABS:                        10.4   4.31  )-----------( 241      ( 26 Dec 2023 07:03 )             32.4                         11.0   4.09  )-----------( 254      ( 25 Dec 2023 07:20 )             34.1                         11.6   3.83  )-----------( 257      ( 24 Dec 2023 16:05 )             36.4     12-26    140  |  105  |  8   ----------------------------<  71  4.0   |  23  |  1.05  12-25    142  |  108  |  7   ----------------------------<  82  3.3<L>   |  24  |  1.00  12-24    138  |  105  |  9   ----------------------------<  116<H>  3.4<L>   |  25  |  1.00    Ca    8.7      26 Dec 2023 07:08  Ca    8.9      25 Dec 2023 07:20  Ca    9.6      24 Dec 2023 16:05  Phos  3.3     12-26  Mg     1.8     12-26    TPro  5.7<L>  /  Alb  3.1<L>  /  TBili  0.4  /  DBili  x   /  AST  21  /  ALT  11  /  AlkPhos  138<H>  12-26  TPro  6.0  /  Alb  3.2<L>  /  TBili  0.3  /  DBili  x   /  AST  24  /  ALT  12  /  AlkPhos  160<H>  12-25  TPro  7.1  /  Alb  3.7  /  TBili  0.4  /  DBili  x   /  AST  29  /  ALT  16  /  AlkPhos  187<H>  12-24    CAPILLARY BLOOD GLUCOSE        PT/INR - ( 25 Dec 2023 07:20 )   PT: 11.3 sec;   INR: 1.08 ratio         PTT - ( 25 Dec 2023 07:20 )  PTT:28.7 sec    Urinalysis Basic - ( 26 Dec 2023 07:08 )    Color: x / Appearance: x / SG: x / pH: x  Gluc: 71 mg/dL / Ketone: x  / Bili: x / Urobili: x   Blood: x / Protein: x / Nitrite: x   Leuk Esterase: x / RBC: x / WBC x   Sq Epi: x / Non Sq Epi: x / Bacteria: x          RADIOLOGY & ADDITIONAL TESTS:    Imaging Personally Reviewed:  [x ] YES  [ ] NO    Consultants involved in case:   Consultant(s) Notes Reviewed:  [ x] YES  [ ] NO:   Care Discussed with Consultants/Other Providers [x ] YES  [ ] NO

## 2023-12-27 NOTE — DIETITIAN INITIAL EVALUATION ADULT - OTHER CALCULATIONS
Defer fluid needs to team. Calculations accounting for factors of metastatic prostate cancer and malnutrition

## 2023-12-27 NOTE — DIETITIAN INITIAL EVALUATION ADULT - PERTINENT MEDS FT
MEDICATIONS  (STANDING):  aspirin  chewable 81 milliGRAM(s) Oral daily  chlorhexidine 4% Liquid 1 Application(s) Topical daily  finasteride 5 milliGRAM(s) Oral daily  FLUoxetine 20 milliGRAM(s) Oral daily  heparin   Injectable 5000 Unit(s) SubCutaneous every 12 hours  influenza  Vaccine (HIGH DOSE) 0.7 milliLiter(s) IntraMuscular once  lactated ringers. 1000 milliLiter(s) (100 mL/Hr) IV Continuous <Continuous>  naloxone Injectable 0.4 milliGRAM(s) IV Push once  pantoprazole    Tablet 40 milliGRAM(s) Oral before breakfast  piperacillin/tazobactam IVPB.. 3.375 Gram(s) IV Intermittent every 8 hours  polyethylene glycol 3350 17 Gram(s) Oral daily  senna 2 Tablet(s) Oral at bedtime  simvastatin 40 milliGRAM(s) Oral at bedtime  tamsulosin 0.4 milliGRAM(s) Oral at bedtime    MEDICATIONS  (PRN):  acetaminophen     Tablet .. 650 milliGRAM(s) Oral every 6 hours PRN Temp greater or equal to 38C (100.4F), Mild Pain (1 - 3)  aluminum hydroxide/magnesium hydroxide/simethicone Suspension 30 milliLiter(s) Oral every 4 hours PRN Dyspepsia  bisacodyl 5 milliGRAM(s) Oral daily PRN Constipation  melatonin 3 milliGRAM(s) Oral at bedtime PRN Insomnia  ondansetron Injectable 4 milliGRAM(s) IV Push every 8 hours PRN Nausea and/or Vomiting  oxyCODONE    IR 2.5 milliGRAM(s) Oral every 4 hours PRN Moderate Pain (4 - 6)  oxyCODONE    IR 5 milliGRAM(s) Oral every 4 hours PRN Severe Pain (7 - 10)

## 2023-12-27 NOTE — DIETITIAN INITIAL EVALUATION ADULT - ADD RECOMMEND
1. continue current diet as tolerated of: regular diet  2. encourage PO intake, protein source with each meal, supplement intake as ordered as tolerated  3. recommend addition of Ensure Plus HP BID to help provide extra calories and protein  4. if medically feasible, consider addition of MVI in setting of malnutrition  5. monitor PO intake, weight trend, electrolytes, blood glucose levels, labs, BMs

## 2023-12-27 NOTE — DIETITIAN INITIAL EVALUATION ADULT - OTHER INFO
NKFA per patient. Patient reports his most recent UBW is 110lbs, reporting a near 35-40lbs unintentional weight loss within several months. Patient w/ BW 146lbs 2/24/2023 per [facility]EVES, reflective of at least 36lbs of weight loss x10 months. Will monitor weight trend.    Prior hypokalemia noted, now WNL since 12/26. LR IVF noted.

## 2023-12-27 NOTE — PROGRESS NOTE ADULT - ATTENDING COMMENTS
84yo 68kg m w pmh htn, hld, cad s/p pci w stent, sah, gerd, depression, metastatic prostate ca, p/w abdominal pain; found to have outpatient imaging findings suggestive of proctitis w rectal perforation + ascending uti; sent to er and admitted to medicine for further mgmt.    1. Rectal perforation. 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 Marked mid/lower rectal wall thickening, hypervascularity, and extraluminal air extending through the anal sphincter complex down to the anal verge. Given the absence of recent procedure in this location, findings are concerning for rectal perforation." C/w Zosyn. Repeat CT with rectal contrast showing no perfeoration. CRS consulted- no acute intervention appreciate their recommendations   2. Acute UTI- C.w Zosyn as above change to oral pending sensitivities    3. Prostate cancer metastatic to bone: Hold Op tx. C/w browne. Appreciate urology recs . 82yo 68kg m w pmh htn, hld, cad s/p pci w stent, sah, gerd, depression, metastatic prostate ca, p/w abdominal pain; found to have outpatient imaging findings suggestive of proctitis w rectal perforation + ascending uti; sent to er and admitted to medicine for further mgmt.    1. Rectal perforation. 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 Marked mid/lower rectal wall thickening, hypervascularity, and extraluminal air extending through the anal sphincter complex down to the anal verge. Given the absence of recent procedure in this location, findings are concerning for rectal perforation." C/w Zosyn. Repeat CT with rectal contrast showing no perfeoration. CRS consulted- no acute intervention appreciate their recommendations   2. Acute UTI- C.w Zosyn as above change to oral pending sensitivities    3. Prostate cancer metastatic to bone: Hold Op tx. C/w browne. Appreciate urology recs .

## 2023-12-27 NOTE — DIETITIAN INITIAL EVALUATION ADULT - PROBLEM SELECTOR PLAN 8
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 Innumerable osseous metastases consistent with known metastatic prostate malignancy. Mild L1 and L3 vertebral body compression deformities, age indeterminate;" additionally, also mentions, "4.4 cm left hepatic lesion is most consistent with a hemangioma." however, also mentions "Indeterminate inferior right hepatic lesion measures 1.6 cm....Abdominal MRI is recommended for characterization when feasible."  ?liver mets?  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 Innumerable osseous metastases consistent with known metastatic prostate malignancy. Mild L1 and L3 vertebral body compression deformities, age indeterminate;" additionally, also mentions, "4.4 cm left hepatic lesion is most consistent with a hemangioma." however, also mentions "Indeterminate inferior right hepatic lesion measures 1.6 cm....Abdominal MRI is recommended for characterization when feasible."  ?liver mets?  cont home tamsulosin + finasteride  currently on zytiga, casodex, eligard inj + prednisone for treatment; hold for now, resume on discharge  outpatient follow up abdominal mri + urology follow up for further mgmt

## 2023-12-27 NOTE — DIETITIAN INITIAL EVALUATION ADULT - PERSON TAUGHT/METHOD
adequate caloric/protein intake w/ food sources reviewed, weight management, role of oral nutrition supplements, all questions were answered/verbal instruction/teach back - (Patient repeats in own words)/patient instructed

## 2023-12-27 NOTE — DIETITIAN INITIAL EVALUATION ADULT - FLUID ACCUMULATION
[FreeTextEntry1] : 89 yo male with a history of dvt s/p ivc filter (2005) on coumadin, s/p ppm, hld, chronic lower extremity dvt found on sonogram and left lower extremity traumatic wound after fall on july 4th presents today for unna boot change.  pt without any complaints at this time only some occasional stabbing pain this week \par  None per documentation

## 2023-12-27 NOTE — DIETITIAN INITIAL EVALUATION ADULT - PROBLEM SELECTOR PLAN 1
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 Marked mid/lower rectal wall thickening, hypervascularity, and extraluminal air extending through the anal sphincter complex down to the anal verge. Given the absence of recent procedure in this location, findings are concerning for rectal perforation."  s/p zosyn in ER  Monitor for fever, worsening white count  serial abdominal exams, serial abdominal imaging as needed  npo/bowel rest for now; ivf + lytes as needed in the mean time  supportive care with analgesics, antiemetics, antipyretics as needed  empirical abx w zosyn for now   crs consulted by er; pending recs

## 2023-12-27 NOTE — PROGRESS NOTE ADULT - PROBLEM SELECTOR PLAN 2
ua shows active sediment w large LE, pyuria, nitrites, bacteriuria; suggestive of uti  imaging shows "urinary bladder cystitis and ascending urinary tract infection. No CT evidence of pyelonephritis. No obstructing ureteral calculus or hydronephrosis."  browne in place  s/p zosyn in er  follow up urine cultures  empirical abx w zosyn for now; deescalate based on final urine c+s h/o s/p pci w stent  no clinft of acs  monitor for chest pain and ekg changes  cont home statin  hold home asa for now  cards following would appreciate recs

## 2023-12-27 NOTE — DIETITIAN INITIAL EVALUATION ADULT - REASON FOR ADMISSION
Noninfectious gastroenteritis    Per chart, patient is a 82 y/o male with PMH including HTN, HLD, CAD (s/p PCI w/ stent), SAH, GERD, depression, metastatic prostate cancer. Patient presents to [facility] w/ abdominal pain x1 week, constipation, hematochezia, dysuria. Patient reported to have had outpatient imaging w/ findings suggestive of proctitis w/ rectal perforation and ascending UTI. CRS consulted. Noninfectious gastroenteritis    Per chart, patient is a 84 y/o male with PMH including HTN, HLD, CAD (s/p PCI w/ stent), SAH, GERD, depression, metastatic prostate cancer. Patient presents to [facility] w/ abdominal pain x1 week, constipation, hematochezia, dysuria. Patient reported to have had outpatient imaging w/ findings suggestive of proctitis w/ rectal perforation and ascending UTI. CRS consulted.

## 2023-12-27 NOTE — PROGRESS NOTE ADULT - SUBJECTIVE AND OBJECTIVE BOX
DATE OF SERVICE: 12-27-23 @ 16:02    Patient is a 83y old  Male who presents with a chief complaint of Noninfectious gastroenteritis    Per chart, patient is a 82 y/o male with PMH including HTN, HLD, CAD (s/p PCI w/ stent), SAH, GERD, depression, metastatic prostate cancer. Patient presents to [facility] w/ abdominal pain x1 week, constipation, hematochezia, dysuria. Patient reported to have had outpatient imaging w/ findings suggestive of proctitis w/ rectal perforation and ascending UTI. CRS consulted. (27 Dec 2023 11:24)      INTERVAL HISTORY: Feels ok,     REVIEW OF SYSTEMS:  CONSTITUTIONAL: No weakness  EYES/ENT: No visual changes;  No throat pain   NECK: No pain or stiffness  RESPIRATORY: No cough, wheezing; No shortness of breath  CARDIOVASCULAR: No chest pain or palpitations  GASTROINTESTINAL: No abdominal  pain. No nausea, vomiting, or hematemesis  GENITOURINARY: No dysuria, frequency or hematuria  NEUROLOGICAL: No stroke like symptoms  SKIN: No rashes    	  MEDICATIONS:        PHYSICAL EXAM:  T(C): 36.7 (12-27-23 @ 14:41), Max: 36.8 (12-27-23 @ 06:45)  HR: 57 (12-27-23 @ 14:41) (56 - 85)  BP: 109/55 (12-27-23 @ 14:41) (98/53 - 113/59)  RR: 16 (12-27-23 @ 14:41) (16 - 20)  SpO2: 97% (12-27-23 @ 14:41) (95% - 97%)  Wt(kg): --  I&O's Summary    26 Dec 2023 07:01  -  27 Dec 2023 07:00  --------------------------------------------------------  IN: 0 mL / OUT: 700 mL / NET: -700 mL    27 Dec 2023 07:01  -  27 Dec 2023 16:02  --------------------------------------------------------  IN: 480 mL / OUT: 850 mL / NET: -370 mL          Appearance: In no distress	  HEENT:    PERRL, EOMI	  Cardiovascular:  S1 S2, No JVD  Respiratory: Lungs clear to auscultation	  Gastrointestinal:  Soft, Non-tender, + BS	  Vascularature:  No edema of LE  Psychiatric: Appropriate affect   Neuro: no acute focal deficits                               9.7    4.06  )-----------( 230      ( 27 Dec 2023 07:19 )             30.1     12-27    137  |  104  |  9   ----------------------------<  96  3.7   |  22  |  1.13    Ca    8.3<L>      27 Dec 2023 07:21  Phos  3.5     12-27  Mg     2.2     12-27    TPro  5.6<L>  /  Alb  3.1<L>  /  TBili  0.4  /  DBili  x   /  AST  19  /  ALT  7<L>  /  AlkPhos  128<H>  12-27        Labs personally reviewed      ASSESSMENT/PLAN: 	  84yo 68kg m w pmh htn, hld, cad s/p pci w stent, sah, gerd, depression, metastatic prostate ca, p/w abdominal pain; found to have outpatient imaging findings suggestive of proctitis w rectal perforation + ascending uti; sent to er and admitted to medicine for further mgmt.      Problem/Plan - 1:  ·  Problem: Rectal perforation.   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 Marked mid/lower rectal wall thickening, hypervascularity, and extraluminal air extending through the anal sphincter complex down to the anal verge. Given the absence of recent procedure in this location, findings are concerning for rectal perforation."  s/p zosyn in ER  Monitor for fever, worsening white count  serial abdominal exams, serial abdominal imaging as needed  npo/bowel rest for now; ivf + lytes as needed in the mean time  supportive care with analgesics, antiemetics, antipyretics as needed  empirical abx w zosyn for now   -CRS and Urology consulted recs appreciated- No acute intervention needed  - Heme/Onc rec pending    Problem/Plan - 2:  ·  Problem: Acute UTI.   ·  Plan: ua shows active sediment w large LE, pyuria, nitrites, bacteriuria; suggestive of uti  imaging shows "urinary bladder cystitis and ascending urinary tract infection. No CT evidence of pyelonephritis. No obstructing ureteral calculus or hydronephrosis."  browne in place- urology recs appreciated  s/p zosyn in er       Problem/Plan - 3:  ·  Problem: CAD (coronary artery disease).   ·  Plan: s/p PCI RCA  2011   c/w asa     Problem/Plan - 4:  ·  Problem: HLD (hyperlipidemia).   ·  Plan: c/w simvastatin.          Ashli Merida, RAGHAV-NP   Rashawn Galan, DO [facility]  Cardiovascular Medicine  [address], [address]  Available through call or text on [employer] TE[employer]  Office: [phone]   DATE OF SERVICE: 12-27-23 @ 16:02    Patient is a 83y old  Male who presents with a chief complaint of Noninfectious gastroenteritis    Per chart, patient is a 84 y/o male with PMH including HTN, HLD, CAD (s/p PCI w/ stent), SAH, GERD, depression, metastatic prostate cancer. Patient presents to [facility] w/ abdominal pain x1 week, constipation, hematochezia, dysuria. Patient reported to have had outpatient imaging w/ findings suggestive of proctitis w/ rectal perforation and ascending UTI. CRS consulted. (27 Dec 2023 11:24)      INTERVAL HISTORY: Feels ok,     REVIEW OF SYSTEMS:  CONSTITUTIONAL: No weakness  EYES/ENT: No visual changes;  No throat pain   NECK: No pain or stiffness  RESPIRATORY: No cough, wheezing; No shortness of breath  CARDIOVASCULAR: No chest pain or palpitations  GASTROINTESTINAL: No abdominal  pain. No nausea, vomiting, or hematemesis  GENITOURINARY: No dysuria, frequency or hematuria  NEUROLOGICAL: No stroke like symptoms  SKIN: No rashes    	  MEDICATIONS:        PHYSICAL EXAM:  T(C): 36.7 (12-27-23 @ 14:41), Max: 36.8 (12-27-23 @ 06:45)  HR: 57 (12-27-23 @ 14:41) (56 - 85)  BP: 109/55 (12-27-23 @ 14:41) (98/53 - 113/59)  RR: 16 (12-27-23 @ 14:41) (16 - 20)  SpO2: 97% (12-27-23 @ 14:41) (95% - 97%)  Wt(kg): --  I&O's Summary    26 Dec 2023 07:01  -  27 Dec 2023 07:00  --------------------------------------------------------  IN: 0 mL / OUT: 700 mL / NET: -700 mL    27 Dec 2023 07:01  -  27 Dec 2023 16:02  --------------------------------------------------------  IN: 480 mL / OUT: 850 mL / NET: -370 mL          Appearance: In no distress	  HEENT:    PERRL, EOMI	  Cardiovascular:  S1 S2, No JVD  Respiratory: Lungs clear to auscultation	  Gastrointestinal:  Soft, Non-tender, + BS	  Vascularature:  No edema of LE  Psychiatric: Appropriate affect   Neuro: no acute focal deficits                               9.7    4.06  )-----------( 230      ( 27 Dec 2023 07:19 )             30.1     12-27    137  |  104  |  9   ----------------------------<  96  3.7   |  22  |  1.13    Ca    8.3<L>      27 Dec 2023 07:21  Phos  3.5     12-27  Mg     2.2     12-27    TPro  5.6<L>  /  Alb  3.1<L>  /  TBili  0.4  /  DBili  x   /  AST  19  /  ALT  7<L>  /  AlkPhos  128<H>  12-27        Labs personally reviewed      ASSESSMENT/PLAN: 	  84yo 68kg m w pmh htn, hld, cad s/p pci w stent, sah, gerd, depression, metastatic prostate ca, p/w abdominal pain; found to have outpatient imaging findings suggestive of proctitis w rectal perforation + ascending uti; sent to er and admitted to medicine for further mgmt.      Problem/Plan - 1:  ·  Problem: Rectal perforation.   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 Marked mid/lower rectal wall thickening, hypervascularity, and extraluminal air extending through the anal sphincter complex down to the anal verge. Given the absence of recent procedure in this location, findings are concerning for rectal perforation."  s/p zosyn in ER  Monitor for fever, worsening white count  serial abdominal exams, serial abdominal imaging as needed  npo/bowel rest for now; ivf + lytes as needed in the mean time  supportive care with analgesics, antiemetics, antipyretics as needed  empirical abx w zosyn for now   -CRS and Urology consulted recs appreciated- No acute intervention needed  - Heme/Onc rec pending    Problem/Plan - 2:  ·  Problem: Acute UTI.   ·  Plan: ua shows active sediment w large LE, pyuria, nitrites, bacteriuria; suggestive of uti  imaging shows "urinary bladder cystitis and ascending urinary tract infection. No CT evidence of pyelonephritis. No obstructing ureteral calculus or hydronephrosis."  browne in place- urology recs appreciated  s/p zosyn in er       Problem/Plan - 3:  ·  Problem: CAD (coronary artery disease).   ·  Plan: s/p PCI RCA  2011   c/w asa     Problem/Plan - 4:  ·  Problem: HLD (hyperlipidemia).   ·  Plan: c/w simvastatin.          Ashli Merida, RAGHAV-NP   Rashawn Galan, DO [facility]  Cardiovascular Medicine  [address], [address]  Available through call or text on [employer] TE[employer]  Office: [phone]

## 2023-12-27 NOTE — DIETITIAN INITIAL EVALUATION ADULT - ENERGY INTAKE
Fair (50-75%) Patient currently w/ fair-good PO intake after diet advancement and does not report any abdominal pain/discomfort w/ digestion of PO. Patient amenable for oral nutrition supplementation (ok for Ensure, normally prefers Boost at home).

## 2023-12-27 NOTE — PROGRESS NOTE ADULT - ATTENDING COMMENTS
CT with resolution of perirectal air, no extravasation of contrast    d/c home when ok with medical team  f/u with me in 1 month and will do repeat imaging at that time

## 2023-12-27 NOTE — PROGRESS NOTE ADULT - ASSESSMENT
82yo 68kg m w pmh htn, hld, cad s/p pci w stent, sah, gerd, depression, metastatic prostate ca, p/w abdominal pain; found to have outpatient imaging findings suggestive of proctitis w rectal perforation + ascending uti; sent to er and admitted to medicine for further mgmt.

## 2023-12-27 NOTE — DIETITIAN INITIAL EVALUATION ADULT - HEIGHT FOR BMI (INCHES)
Principal Discharge DX:	Hemodialysis AV fistula thrombosis  Secondary Diagnosis:	Hemodialysis patient
7

## 2023-12-27 NOTE — DISCHARGE NOTE NURSING/CASE MANAGEMENT/SOCIAL WORK - PATIENT PORTAL LINK FT
You can access the FollowMyHealth Patient Portal offered by [facility] by registering at the following website: [email or web address]. By joining [facility]’s FollowMyHealth portal, you will also be able to view your health information using other applications (apps) compatible with our system. You can access the FollowMyHealth Patient Portal offered by [facility] by registering at the following website: [email or web address]. By joining [employer]’s FollowMyHealth portal, you will also be able to view your health information using other applications (apps) compatible with our system.

## 2023-12-27 NOTE — PROGRESS NOTE ADULT - PROBLEM SELECTOR PLAN 7
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 Innumerable osseous metastases consistent with known metastatic prostate malignancy. Mild L1 and L3 vertebral body compression deformities, age indeterminate;" additionally, also mentions, "4.4 cm left hepatic lesion is most consistent with a hemangioma." however, also mentions "Indeterminate inferior right hepatic lesion measures 1.6 cm....Abdominal MRI is recommended for characterization when feasible."  ?liver mets?  cont home tamsulosin + finasteride  currently on zytiga, casodex, eligard inj + prednisone for treatment; hold for now, resume on discharge  outpatient follow up abdominal mri + urology follow up for further mgmt

## 2023-12-27 NOTE — DIETITIAN INITIAL EVALUATION ADULT - REASON INDICATOR FOR ASSESSMENT
Consult for "assessment, education, MST score 2 or >"  Source: chart, patient  Chart reviewed, events noted

## 2023-12-27 NOTE — DIETITIAN INITIAL EVALUATION ADULT - PERTINENT LABORATORY DATA
12-27    137  |  104  |  9   ----------------------------<  96  3.7   |  22  |  1.13    Ca    8.3<L>      27 Dec 2023 07:21  Phos  3.5     12-27  Mg     2.2     12-27    TPro  5.6<L>  /  Alb  3.1<L>  /  TBili  0.4  /  DBili  x   /  AST  19  /  ALT  7<L>  /  AlkPhos  128<H>  12-27  A1C with Estimated Average Glucose Result: 5.4 % (12-25-23 @ 07:20)

## 2023-12-28 VITALS
RESPIRATION RATE: 18 BRPM | SYSTOLIC BLOOD PRESSURE: 104 MMHG | TEMPERATURE: 98 F | DIASTOLIC BLOOD PRESSURE: 59 MMHG | OXYGEN SATURATION: 96 % | HEART RATE: 65 BPM

## 2023-12-28 LAB
ALBUMIN SERPL ELPH-MCNC: 2.8 G/DL — LOW (ref 3.3–5)
ALBUMIN SERPL ELPH-MCNC: 2.8 G/DL — LOW (ref 3.3–5)
ALP SERPL-CCNC: 113 U/L — SIGNIFICANT CHANGE UP (ref 40–120)
ALP SERPL-CCNC: 113 U/L — SIGNIFICANT CHANGE UP (ref 40–120)
ALT FLD-CCNC: 9 U/L — LOW (ref 10–45)
ALT FLD-CCNC: 9 U/L — LOW (ref 10–45)
ANION GAP SERPL CALC-SCNC: 7 MMOL/L — SIGNIFICANT CHANGE UP (ref 5–17)
ANION GAP SERPL CALC-SCNC: 7 MMOL/L — SIGNIFICANT CHANGE UP (ref 5–17)
AST SERPL-CCNC: 20 U/L — SIGNIFICANT CHANGE UP (ref 10–40)
AST SERPL-CCNC: 20 U/L — SIGNIFICANT CHANGE UP (ref 10–40)
BASOPHILS # BLD AUTO: 0.05 K/UL — SIGNIFICANT CHANGE UP (ref 0–0.2)
BASOPHILS # BLD AUTO: 0.05 K/UL — SIGNIFICANT CHANGE UP (ref 0–0.2)
BASOPHILS NFR BLD AUTO: 1.1 % — SIGNIFICANT CHANGE UP (ref 0–2)
BASOPHILS NFR BLD AUTO: 1.1 % — SIGNIFICANT CHANGE UP (ref 0–2)
BILIRUB SERPL-MCNC: 0.4 MG/DL — SIGNIFICANT CHANGE UP (ref 0.2–1.2)
BILIRUB SERPL-MCNC: 0.4 MG/DL — SIGNIFICANT CHANGE UP (ref 0.2–1.2)
BLD GP AB SCN SERPL QL: NEGATIVE — SIGNIFICANT CHANGE UP
BLD GP AB SCN SERPL QL: NEGATIVE — SIGNIFICANT CHANGE UP
BUN SERPL-MCNC: 7 MG/DL — SIGNIFICANT CHANGE UP (ref 7–23)
BUN SERPL-MCNC: 7 MG/DL — SIGNIFICANT CHANGE UP (ref 7–23)
CALCIUM SERPL-MCNC: 8.4 MG/DL — SIGNIFICANT CHANGE UP (ref 8.4–10.5)
CALCIUM SERPL-MCNC: 8.4 MG/DL — SIGNIFICANT CHANGE UP (ref 8.4–10.5)
CHLORIDE SERPL-SCNC: 108 MMOL/L — SIGNIFICANT CHANGE UP (ref 96–108)
CHLORIDE SERPL-SCNC: 108 MMOL/L — SIGNIFICANT CHANGE UP (ref 96–108)
CO2 SERPL-SCNC: 26 MMOL/L — SIGNIFICANT CHANGE UP (ref 22–31)
CO2 SERPL-SCNC: 26 MMOL/L — SIGNIFICANT CHANGE UP (ref 22–31)
CREAT SERPL-MCNC: 1.04 MG/DL — SIGNIFICANT CHANGE UP (ref 0.5–1.3)
CREAT SERPL-MCNC: 1.04 MG/DL — SIGNIFICANT CHANGE UP (ref 0.5–1.3)
EGFR: 70 ML/MIN/1.73M2 — SIGNIFICANT CHANGE UP
EGFR: 70 ML/MIN/1.73M2 — SIGNIFICANT CHANGE UP
EOSINOPHIL # BLD AUTO: 0.17 K/UL — SIGNIFICANT CHANGE UP (ref 0–0.5)
EOSINOPHIL # BLD AUTO: 0.17 K/UL — SIGNIFICANT CHANGE UP (ref 0–0.5)
EOSINOPHIL NFR BLD AUTO: 3.7 % — SIGNIFICANT CHANGE UP (ref 0–6)
EOSINOPHIL NFR BLD AUTO: 3.7 % — SIGNIFICANT CHANGE UP (ref 0–6)
GLUCOSE SERPL-MCNC: 93 MG/DL — SIGNIFICANT CHANGE UP (ref 70–99)
GLUCOSE SERPL-MCNC: 93 MG/DL — SIGNIFICANT CHANGE UP (ref 70–99)
HCT VFR BLD CALC: 30.5 % — LOW (ref 39–50)
HCT VFR BLD CALC: 30.5 % — LOW (ref 39–50)
HGB BLD-MCNC: 10 G/DL — LOW (ref 13–17)
HGB BLD-MCNC: 10 G/DL — LOW (ref 13–17)
IMM GRANULOCYTES NFR BLD AUTO: 0.2 % — SIGNIFICANT CHANGE UP (ref 0–0.9)
IMM GRANULOCYTES NFR BLD AUTO: 0.2 % — SIGNIFICANT CHANGE UP (ref 0–0.9)
LYMPHOCYTES # BLD AUTO: 1.27 K/UL — SIGNIFICANT CHANGE UP (ref 1–3.3)
LYMPHOCYTES # BLD AUTO: 1.27 K/UL — SIGNIFICANT CHANGE UP (ref 1–3.3)
LYMPHOCYTES # BLD AUTO: 27.7 % — SIGNIFICANT CHANGE UP (ref 13–44)
LYMPHOCYTES # BLD AUTO: 27.7 % — SIGNIFICANT CHANGE UP (ref 13–44)
MAGNESIUM SERPL-MCNC: 1.9 MG/DL — SIGNIFICANT CHANGE UP (ref 1.6–2.6)
MAGNESIUM SERPL-MCNC: 1.9 MG/DL — SIGNIFICANT CHANGE UP (ref 1.6–2.6)
MCHC RBC-ENTMCNC: 30 PG — SIGNIFICANT CHANGE UP (ref 27–34)
MCHC RBC-ENTMCNC: 30 PG — SIGNIFICANT CHANGE UP (ref 27–34)
MCHC RBC-ENTMCNC: 32.8 GM/DL — SIGNIFICANT CHANGE UP (ref 32–36)
MCHC RBC-ENTMCNC: 32.8 GM/DL — SIGNIFICANT CHANGE UP (ref 32–36)
MCV RBC AUTO: 91.6 FL — SIGNIFICANT CHANGE UP (ref 80–100)
MCV RBC AUTO: 91.6 FL — SIGNIFICANT CHANGE UP (ref 80–100)
MONOCYTES # BLD AUTO: 0.43 K/UL — SIGNIFICANT CHANGE UP (ref 0–0.9)
MONOCYTES # BLD AUTO: 0.43 K/UL — SIGNIFICANT CHANGE UP (ref 0–0.9)
MONOCYTES NFR BLD AUTO: 9.4 % — SIGNIFICANT CHANGE UP (ref 2–14)
MONOCYTES NFR BLD AUTO: 9.4 % — SIGNIFICANT CHANGE UP (ref 2–14)
NEUTROPHILS # BLD AUTO: 2.65 K/UL — SIGNIFICANT CHANGE UP (ref 1.8–7.4)
NEUTROPHILS # BLD AUTO: 2.65 K/UL — SIGNIFICANT CHANGE UP (ref 1.8–7.4)
NEUTROPHILS NFR BLD AUTO: 57.9 % — SIGNIFICANT CHANGE UP (ref 43–77)
NEUTROPHILS NFR BLD AUTO: 57.9 % — SIGNIFICANT CHANGE UP (ref 43–77)
NRBC # BLD: 0 /100 WBCS — SIGNIFICANT CHANGE UP (ref 0–0)
NRBC # BLD: 0 /100 WBCS — SIGNIFICANT CHANGE UP (ref 0–0)
PHOSPHATE SERPL-MCNC: 3.4 MG/DL — SIGNIFICANT CHANGE UP (ref 2.5–4.5)
PHOSPHATE SERPL-MCNC: 3.4 MG/DL — SIGNIFICANT CHANGE UP (ref 2.5–4.5)
PLATELET # BLD AUTO: 218 K/UL — SIGNIFICANT CHANGE UP (ref 150–400)
PLATELET # BLD AUTO: 218 K/UL — SIGNIFICANT CHANGE UP (ref 150–400)
POTASSIUM SERPL-MCNC: 3.5 MMOL/L — SIGNIFICANT CHANGE UP (ref 3.5–5.3)
POTASSIUM SERPL-MCNC: 3.5 MMOL/L — SIGNIFICANT CHANGE UP (ref 3.5–5.3)
POTASSIUM SERPL-SCNC: 3.5 MMOL/L — SIGNIFICANT CHANGE UP (ref 3.5–5.3)
POTASSIUM SERPL-SCNC: 3.5 MMOL/L — SIGNIFICANT CHANGE UP (ref 3.5–5.3)
PROT SERPL-MCNC: 5.4 G/DL — LOW (ref 6–8.3)
PROT SERPL-MCNC: 5.4 G/DL — LOW (ref 6–8.3)
RBC # BLD: 3.33 M/UL — LOW (ref 4.2–5.8)
RBC # BLD: 3.33 M/UL — LOW (ref 4.2–5.8)
RBC # FLD: 16.4 % — HIGH (ref 10.3–14.5)
RBC # FLD: 16.4 % — HIGH (ref 10.3–14.5)
RH IG SCN BLD-IMP: POSITIVE — SIGNIFICANT CHANGE UP
RH IG SCN BLD-IMP: POSITIVE — SIGNIFICANT CHANGE UP
SODIUM SERPL-SCNC: 141 MMOL/L — SIGNIFICANT CHANGE UP (ref 135–145)
SODIUM SERPL-SCNC: 141 MMOL/L — SIGNIFICANT CHANGE UP (ref 135–145)
WBC # BLD: 4.58 K/UL — SIGNIFICANT CHANGE UP (ref 3.8–10.5)
WBC # BLD: 4.58 K/UL — SIGNIFICANT CHANGE UP (ref 3.8–10.5)
WBC # FLD AUTO: 4.58 K/UL — SIGNIFICANT CHANGE UP (ref 3.8–10.5)
WBC # FLD AUTO: 4.58 K/UL — SIGNIFICANT CHANGE UP (ref 3.8–10.5)

## 2023-12-28 PROCEDURE — 82803 BLOOD GASES ANY COMBINATION: CPT

## 2023-12-28 PROCEDURE — 87040 BLOOD CULTURE FOR BACTERIA: CPT

## 2023-12-28 PROCEDURE — 96375 TX/PRO/DX INJ NEW DRUG ADDON: CPT

## 2023-12-28 PROCEDURE — 84439 ASSAY OF FREE THYROXINE: CPT

## 2023-12-28 PROCEDURE — 87077 CULTURE AEROBIC IDENTIFY: CPT

## 2023-12-28 PROCEDURE — 86900 BLOOD TYPING SEROLOGIC ABO: CPT

## 2023-12-28 PROCEDURE — 84443 ASSAY THYROID STIM HORMONE: CPT

## 2023-12-28 PROCEDURE — 97116 GAIT TRAINING THERAPY: CPT

## 2023-12-28 PROCEDURE — 96374 THER/PROPH/DIAG INJ IV PUSH: CPT

## 2023-12-28 PROCEDURE — 82435 ASSAY OF BLOOD CHLORIDE: CPT

## 2023-12-28 PROCEDURE — 99285 EMERGENCY DEPT VISIT HI MDM: CPT

## 2023-12-28 PROCEDURE — 86901 BLOOD TYPING SEROLOGIC RH(D): CPT

## 2023-12-28 PROCEDURE — 85025 COMPLETE CBC W/AUTO DIFF WBC: CPT

## 2023-12-28 PROCEDURE — 83605 ASSAY OF LACTIC ACID: CPT

## 2023-12-28 PROCEDURE — 74177 CT ABD & PELVIS W/CONTRAST: CPT

## 2023-12-28 PROCEDURE — 83036 HEMOGLOBIN GLYCOSYLATED A1C: CPT

## 2023-12-28 PROCEDURE — 97166 OT EVAL MOD COMPLEX 45 MIN: CPT

## 2023-12-28 PROCEDURE — 84295 ASSAY OF SERUM SODIUM: CPT

## 2023-12-28 PROCEDURE — 82330 ASSAY OF CALCIUM: CPT

## 2023-12-28 PROCEDURE — 85730 THROMBOPLASTIN TIME PARTIAL: CPT

## 2023-12-28 PROCEDURE — 80061 LIPID PANEL: CPT

## 2023-12-28 PROCEDURE — 82947 ASSAY GLUCOSE BLOOD QUANT: CPT

## 2023-12-28 PROCEDURE — 99232 SBSQ HOSP IP/OBS MODERATE 35: CPT | Mod: GC

## 2023-12-28 PROCEDURE — 85610 PROTHROMBIN TIME: CPT

## 2023-12-28 PROCEDURE — 87186 SC STD MICRODIL/AGAR DIL: CPT

## 2023-12-28 PROCEDURE — 86850 RBC ANTIBODY SCREEN: CPT

## 2023-12-28 PROCEDURE — 80053 COMPREHEN METABOLIC PANEL: CPT

## 2023-12-28 PROCEDURE — 36415 COLL VENOUS BLD VENIPUNCTURE: CPT

## 2023-12-28 PROCEDURE — 85018 HEMOGLOBIN: CPT

## 2023-12-28 PROCEDURE — 99233 SBSQ HOSP IP/OBS HIGH 50: CPT

## 2023-12-28 PROCEDURE — 87086 URINE CULTURE/COLONY COUNT: CPT

## 2023-12-28 PROCEDURE — 85014 HEMATOCRIT: CPT

## 2023-12-28 PROCEDURE — 84132 ASSAY OF SERUM POTASSIUM: CPT

## 2023-12-28 PROCEDURE — 93005 ELECTROCARDIOGRAM TRACING: CPT

## 2023-12-28 PROCEDURE — 83735 ASSAY OF MAGNESIUM: CPT

## 2023-12-28 PROCEDURE — 84100 ASSAY OF PHOSPHORUS: CPT

## 2023-12-28 PROCEDURE — 97161 PT EVAL LOW COMPLEX 20 MIN: CPT

## 2023-12-28 PROCEDURE — 84480 ASSAY TRIIODOTHYRONINE (T3): CPT

## 2023-12-28 PROCEDURE — 87640 STAPH A DNA AMP PROBE: CPT

## 2023-12-28 PROCEDURE — 87641 MR-STAPH DNA AMP PROBE: CPT

## 2023-12-28 PROCEDURE — 81001 URINALYSIS AUTO W/SCOPE: CPT

## 2023-12-28 RX ORDER — CIPROFLOXACIN LACTATE 400MG/40ML
1 VIAL (ML) INTRAVENOUS
Qty: 6 | Refills: 0
Start: 2023-12-28 | End: 2023-12-30

## 2023-12-28 RX ADMIN — PANTOPRAZOLE SODIUM 40 MILLIGRAM(S): 20 TABLET, DELAYED RELEASE ORAL at 05:19

## 2023-12-28 RX ADMIN — CHLORHEXIDINE GLUCONATE 1 APPLICATION(S): 213 SOLUTION TOPICAL at 12:32

## 2023-12-28 RX ADMIN — HEPARIN SODIUM 5000 UNIT(S): 5000 INJECTION INTRAVENOUS; SUBCUTANEOUS at 05:19

## 2023-12-28 RX ADMIN — Medication 20 MILLIGRAM(S): at 12:32

## 2023-12-28 RX ADMIN — FINASTERIDE 5 MILLIGRAM(S): 5 TABLET, FILM COATED ORAL at 12:32

## 2023-12-28 RX ADMIN — PIPERACILLIN AND TAZOBACTAM 25 GRAM(S): 4; .5 INJECTION, POWDER, LYOPHILIZED, FOR SOLUTION INTRAVENOUS at 05:24

## 2023-12-28 NOTE — PROGRESS NOTE ADULT - SUBJECTIVE AND OBJECTIVE BOX
***************************************************************  Jn Lira, PGY1  Internal Medicine   Preferred contact via [employer] Teams   ***************************************************************    WHIT MATTHEWS  83y  MRN: [id number]    Patient is a 83y old  Male who presents with a chief complaint of abdominal pain (27 Dec 2023 16:01)      Interval/Overnight Events: no events ON.     Subjective: Pt seen and examined at bedside. Denies fever, CP, SOB, abn pain, N/V, dysuria. Tolerating diet.      MEDICATIONS  (STANDING):  aspirin  chewable 81 milliGRAM(s) Oral daily  chlorhexidine 4% Liquid 1 Application(s) Topical daily  finasteride 5 milliGRAM(s) Oral daily  FLUoxetine 20 milliGRAM(s) Oral daily  heparin   Injectable 5000 Unit(s) SubCutaneous every 12 hours  influenza  Vaccine (HIGH DOSE) 0.7 milliLiter(s) IntraMuscular once  naloxone Injectable 0.4 milliGRAM(s) IV Push once  pantoprazole    Tablet 40 milliGRAM(s) Oral before breakfast  piperacillin/tazobactam IVPB.. 3.375 Gram(s) IV Intermittent every 8 hours  polyethylene glycol 3350 17 Gram(s) Oral daily  senna 2 Tablet(s) Oral at bedtime  simvastatin 40 milliGRAM(s) Oral at bedtime  tamsulosin 0.4 milliGRAM(s) Oral at bedtime  zoster Vaccine recombinant (SHINGRIX) 0.5 milliLiter(s) IntraMuscular once    MEDICATIONS  (PRN):  acetaminophen     Tablet .. 650 milliGRAM(s) Oral every 6 hours PRN Temp greater or equal to 38C (100.4F), Mild Pain (1 - 3)  aluminum hydroxide/magnesium hydroxide/simethicone Suspension 30 milliLiter(s) Oral every 4 hours PRN Dyspepsia  bisacodyl 5 milliGRAM(s) Oral daily PRN Constipation  melatonin 3 milliGRAM(s) Oral at bedtime PRN Insomnia  ondansetron Injectable 4 milliGRAM(s) IV Push every 8 hours PRN Nausea and/or Vomiting  oxyCODONE    IR 5 milliGRAM(s) Oral every 4 hours PRN Severe Pain (7 - 10)  oxyCODONE    IR 2.5 milliGRAM(s) Oral every 4 hours PRN Moderate Pain (4 - 6)      Objective:    Vitals: Vital Signs Last 24 Hrs  T(C): 36.5 (12-28-23 @ 04:15), Max: 36.7 (12-27-23 @ 14:41)  T(F): 97.7 (12-28-23 @ 04:15), Max: 98.1 (12-27-23 @ 21:30)  HR: 55 (12-28-23 @ 04:15) (50 - 60)  BP: 125/61 (12-28-23 @ 04:15) (91/52 - 125/61)  BP(mean): --  RR: 18 (12-28-23 @ 04:15) (16 - 18)  SpO2: 96% (12-28-23 @ 04:15) (96% - 97%)                I&O's Summary    27 Dec 2023 07:01  -  28 Dec 2023 07:00  --------------------------------------------------------  IN: 480 mL / OUT: 2350 mL / NET: -1870 mL        PHYSICAL EXAM:  GENERAL: NAD  HEAD:  Atraumatic, Normocephalic  EYES: EOMI, conjunctiva and sclera clear  CHEST/LUNG: Clear to auscultation bilaterally; No rales, rhonchi, wheezing, or rubs  HEART: Regular rate and rhythm; No murmurs, rubs, or gallops  ABDOMEN: Soft, Nontender, Nondistended;   SKIN: No rashes or lesions  NERVOUS SYSTEM:  Alert & Oriented X3, no focal deficits    LABS:                        10.0   4.58  )-----------( 218      ( 28 Dec 2023 04:37 )             30.5                         9.7    4.06  )-----------( 230      ( 27 Dec 2023 07:19 )             30.1                         10.4   4.31  )-----------( 241      ( 26 Dec 2023 07:03 )             32.4     12-28    141  |  108  |  7   ----------------------------<  93  3.5   |  26  |  1.04  12-27    137  |  104  |  9   ----------------------------<  96  3.7   |  22  |  1.13  12-26    140  |  105  |  8   ----------------------------<  71  4.0   |  23  |  1.05    Ca    8.4      28 Dec 2023 04:37  Ca    8.3<L>      27 Dec 2023 07:21  Ca    8.7      26 Dec 2023 07:08  Phos  3.4     12-28  Mg     1.9     12-28    TPro  5.4<L>  /  Alb  2.8<L>  /  TBili  0.4  /  DBili  x   /  AST  20  /  ALT  9<L>  /  AlkPhos  113  12-28  TPro  5.6<L>  /  Alb  3.1<L>  /  TBili  0.4  /  DBili  x   /  AST  19  /  ALT  7<L>  /  AlkPhos  128<H>  12-27  TPro  5.7<L>  /  Alb  3.1<L>  /  TBili  0.4  /  DBili  x   /  AST  21  /  ALT  11  /  AlkPhos  138<H>  12-26    CAPILLARY BLOOD GLUCOSE            Urinalysis Basic - ( 28 Dec 2023 04:37 )    Color: x / Appearance: x / SG: x / pH: x  Gluc: 93 mg/dL / Ketone: x  / Bili: x / Urobili: x   Blood: x / Protein: x / Nitrite: x   Leuk Esterase: x / RBC: x / WBC x   Sq Epi: x / Non Sq Epi: x / Bacteria: x          RADIOLOGY & ADDITIONAL TESTS:    Imaging Personally Reviewed:  [x ] YES  [ ] NO    Consultants involved in case:   Consultant(s) Notes Reviewed:  [ x] YES  [ ] NO:   Care Discussed with Consultants/Other Providers [x ] YES  [ ] NO         ***************************************************************  Jn Lira, PGY1  Internal Medicine   Preferred contact via [employer] Teams   ***************************************************************    WHIT MATTHEWS  83y  MRN: [id number]    Patient is a 83y old  Male who presents with a chief complaint of abdominal pain (27 Dec 2023 16:01)      Interval/Overnight Events: no events ON.     Subjective: Pt seen and examined at bedside. Denies fever, CP, SOB, abn pain, N/V, dysuria. Tolerating diet.      MEDICATIONS  (STANDING):  aspirin  chewable 81 milliGRAM(s) Oral daily  chlorhexidine 4% Liquid 1 Application(s) Topical daily  finasteride 5 milliGRAM(s) Oral daily  FLUoxetine 20 milliGRAM(s) Oral daily  heparin   Injectable 5000 Unit(s) SubCutaneous every 12 hours  influenza  Vaccine (HIGH DOSE) 0.7 milliLiter(s) IntraMuscular once  naloxone Injectable 0.4 milliGRAM(s) IV Push once  pantoprazole    Tablet 40 milliGRAM(s) Oral before breakfast  piperacillin/tazobactam IVPB.. 3.375 Gram(s) IV Intermittent every 8 hours  polyethylene glycol 3350 17 Gram(s) Oral daily  senna 2 Tablet(s) Oral at bedtime  simvastatin 40 milliGRAM(s) Oral at bedtime  tamsulosin 0.4 milliGRAM(s) Oral at bedtime  zoster Vaccine recombinant (SHINGRIX) 0.5 milliLiter(s) IntraMuscular once    MEDICATIONS  (PRN):  acetaminophen     Tablet .. 650 milliGRAM(s) Oral every 6 hours PRN Temp greater or equal to 38C (100.4F), Mild Pain (1 - 3)  aluminum hydroxide/magnesium hydroxide/simethicone Suspension 30 milliLiter(s) Oral every 4 hours PRN Dyspepsia  bisacodyl 5 milliGRAM(s) Oral daily PRN Constipation  melatonin 3 milliGRAM(s) Oral at bedtime PRN Insomnia  ondansetron Injectable 4 milliGRAM(s) IV Push every 8 hours PRN Nausea and/or Vomiting  oxyCODONE    IR 5 milliGRAM(s) Oral every 4 hours PRN Severe Pain (7 - 10)  oxyCODONE    IR 2.5 milliGRAM(s) Oral every 4 hours PRN Moderate Pain (4 - 6)      Objective:    Vitals: Vital Signs Last 24 Hrs  T(C): 36.5 (12-28-23 @ 04:15), Max: 36.7 (12-27-23 @ 14:41)  T(F): 97.7 (12-28-23 @ 04:15), Max: 98.1 (12-27-23 @ 21:30)  HR: 55 (12-28-23 @ 04:15) (50 - 60)  BP: 125/61 (12-28-23 @ 04:15) (91/52 - 125/61)  BP(mean): --  RR: 18 (12-28-23 @ 04:15) (16 - 18)  SpO2: 96% (12-28-23 @ 04:15) (96% - 97%)                I&O's Summary    27 Dec 2023 07:01  -  28 Dec 2023 07:00  --------------------------------------------------------  IN: 480 mL / OUT: 2350 mL / NET: -1870 mL        PHYSICAL EXAM:  GENERAL: NAD  HEAD:  Atraumatic, Normocephalic  EYES: EOMI, conjunctiva and sclera clear  CHEST/LUNG: Clear to auscultation bilaterally; No rales, rhonchi, wheezing, or rubs  HEART: Regular rate and rhythm; No murmurs, rubs, or gallops  ABDOMEN: Soft, Nontender, Nondistended;   SKIN: No rashes or lesions  NERVOUS SYSTEM:  Alert & Oriented X3, no focal deficits    LABS:                        10.0   4.58  )-----------( 218      ( 28 Dec 2023 04:37 )             30.5                         9.7    4.06  )-----------( 230      ( 27 Dec 2023 07:19 )             30.1                         10.4   4.31  )-----------( 241      ( 26 Dec 2023 07:03 )             32.4     12-28    141  |  108  |  7   ----------------------------<  93  3.5   |  26  |  1.04  12-27    137  |  104  |  9   ----------------------------<  96  3.7   |  22  |  1.13  12-26    140  |  105  |  8   ----------------------------<  71  4.0   |  23  |  1.05    Ca    8.4      28 Dec 2023 04:37  Ca    8.3<L>      27 Dec 2023 07:21  Ca    8.7      26 Dec 2023 07:08  Phos  3.4     12-28  Mg     1.9     12-28    TPro  5.4<L>  /  Alb  2.8<L>  /  TBili  0.4  /  DBili  x   /  AST  20  /  ALT  9<L>  /  AlkPhos  113  12-28  TPro  5.6<L>  /  Alb  3.1<L>  /  TBili  0.4  /  DBili  x   /  AST  19  /  ALT  7<L>  /  AlkPhos  128<H>  12-27  TPro  5.7<L>  /  Alb  3.1<L>  /  TBili  0.4  /  DBili  x   /  AST  21  /  ALT  11  /  AlkPhos  138<H>  12-26    CAPILLARY BLOOD GLUCOSE            Urinalysis Basic - ( 28 Dec 2023 04:37 )    Color: x / Appearance: x / SG: x / pH: x  Gluc: 93 mg/dL / Ketone: x  / Bili: x / Urobili: x   Blood: x / Protein: x / Nitrite: x   Leuk Esterase: x / RBC: x / WBC x   Sq Epi: x / Non Sq Epi: x / Bacteria: x          RADIOLOGY & ADDITIONAL TESTS:    Imaging Personally Reviewed:  [x ] YES  [ ] NO    Consultants involved in case:   Consultant(s) Notes Reviewed:  [ x] YES  [ ] NO:   Care Discussed with Consultants/Other Providers [x ] YES  [ ] NO         ***************************************************************  Jn Lira, PGY1  Internal Medicine   Preferred contact via [employer] Teams   ***************************************************************    WHIT MATTHEWS  83y  MRN: [id number]    Patient is a 83y old  Male who presents with a chief complaint of abdominal pain (27 Dec 2023 16:01)      Interval/Overnight Events: no events ON.     Subjective: Pt seen and examined at bedside. Pt pleasant and resting calmly.     MEDICATIONS  (STANDING):  aspirin  chewable 81 milliGRAM(s) Oral daily  chlorhexidine 4% Liquid 1 Application(s) Topical daily  finasteride 5 milliGRAM(s) Oral daily  FLUoxetine 20 milliGRAM(s) Oral daily  heparin   Injectable 5000 Unit(s) SubCutaneous every 12 hours  influenza  Vaccine (HIGH DOSE) 0.7 milliLiter(s) IntraMuscular once  naloxone Injectable 0.4 milliGRAM(s) IV Push once  pantoprazole    Tablet 40 milliGRAM(s) Oral before breakfast  piperacillin/tazobactam IVPB.. 3.375 Gram(s) IV Intermittent every 8 hours  polyethylene glycol 3350 17 Gram(s) Oral daily  senna 2 Tablet(s) Oral at bedtime  simvastatin 40 milliGRAM(s) Oral at bedtime  tamsulosin 0.4 milliGRAM(s) Oral at bedtime  zoster Vaccine recombinant (SHINGRIX) 0.5 milliLiter(s) IntraMuscular once    MEDICATIONS  (PRN):  acetaminophen     Tablet .. 650 milliGRAM(s) Oral every 6 hours PRN Temp greater or equal to 38C (100.4F), Mild Pain (1 - 3)  aluminum hydroxide/magnesium hydroxide/simethicone Suspension 30 milliLiter(s) Oral every 4 hours PRN Dyspepsia  bisacodyl 5 milliGRAM(s) Oral daily PRN Constipation  melatonin 3 milliGRAM(s) Oral at bedtime PRN Insomnia  ondansetron Injectable 4 milliGRAM(s) IV Push every 8 hours PRN Nausea and/or Vomiting  oxyCODONE    IR 5 milliGRAM(s) Oral every 4 hours PRN Severe Pain (7 - 10)  oxyCODONE    IR 2.5 milliGRAM(s) Oral every 4 hours PRN Moderate Pain (4 - 6)      Objective:    Vitals: Vital Signs Last 24 Hrs  T(C): 36.5 (12-28-23 @ 04:15), Max: 36.7 (12-27-23 @ 14:41)  T(F): 97.7 (12-28-23 @ 04:15), Max: 98.1 (12-27-23 @ 21:30)  HR: 55 (12-28-23 @ 04:15) (50 - 60)  BP: 125/61 (12-28-23 @ 04:15) (91/52 - 125/61)  BP(mean): --  RR: 18 (12-28-23 @ 04:15) (16 - 18)  SpO2: 96% (12-28-23 @ 04:15) (96% - 97%)                I&O's Summary    27 Dec 2023 07:01  -  28 Dec 2023 07:00  --------------------------------------------------------  IN: 480 mL / OUT: 2350 mL / NET: -1870 mL        PHYSICAL EXAM:  GENERAL: NAD  HEAD:  Atraumatic, Normocephalic  EYES: EOMI, conjunctiva and sclera clear  CHEST/LUNG: Clear to auscultation bilaterally; No rales, rhonchi, wheezing, or rubs  HEART: Regular rate and rhythm; No murmurs, rubs, or gallops  ABDOMEN: Soft, Nontender, Nondistended;   SKIN: No rashes or lesions  NERVOUS SYSTEM:  Alert & Oriented X3, no focal deficits    LABS:                        10.0   4.58  )-----------( 218      ( 28 Dec 2023 04:37 )             30.5                         9.7    4.06  )-----------( 230      ( 27 Dec 2023 07:19 )             30.1                         10.4   4.31  )-----------( 241      ( 26 Dec 2023 07:03 )             32.4     12-28    141  |  108  |  7   ----------------------------<  93  3.5   |  26  |  1.04  12-27    137  |  104  |  9   ----------------------------<  96  3.7   |  22  |  1.13  12-26    140  |  105  |  8   ----------------------------<  71  4.0   |  23  |  1.05    Ca    8.4      28 Dec 2023 04:37  Ca    8.3<L>      27 Dec 2023 07:21  Ca    8.7      26 Dec 2023 07:08  Phos  3.4     12-28  Mg     1.9     12-28    TPro  5.4<L>  /  Alb  2.8<L>  /  TBili  0.4  /  DBili  x   /  AST  20  /  ALT  9<L>  /  AlkPhos  113  12-28  TPro  5.6<L>  /  Alb  3.1<L>  /  TBili  0.4  /  DBili  x   /  AST  19  /  ALT  7<L>  /  AlkPhos  128<H>  12-27  TPro  5.7<L>  /  Alb  3.1<L>  /  TBili  0.4  /  DBili  x   /  AST  21  /  ALT  11  /  AlkPhos  138<H>  12-26    CAPILLARY BLOOD GLUCOSE            Urinalysis Basic - ( 28 Dec 2023 04:37 )    Color: x / Appearance: x / SG: x / pH: x  Gluc: 93 mg/dL / Ketone: x  / Bili: x / Urobili: x   Blood: x / Protein: x / Nitrite: x   Leuk Esterase: x / RBC: x / WBC x   Sq Epi: x / Non Sq Epi: x / Bacteria: x          RADIOLOGY & ADDITIONAL TESTS:    Imaging Personally Reviewed:  [x ] YES  [ ] NO    Consultants involved in case:   Consultant(s) Notes Reviewed:  [ x] YES  [ ] NO:   Care Discussed with Consultants/Other Providers [x ] YES  [ ] NO         ***************************************************************  Jn Lira, PGY1  Internal Medicine   Preferred contact via [employer] Teams   ***************************************************************    WHIT MATTHEWS  83y  MRN: [id number]    Patient is a 83y old  Male who presents with a chief complaint of abdominal pain (27 Dec 2023 16:01)      Interval/Overnight Events: no events ON.     Subjective: Pt seen and examined at bedside. Pt pleasant and resting calmly.     MEDICATIONS  (STANDING):  aspirin  chewable 81 milliGRAM(s) Oral daily  chlorhexidine 4% Liquid 1 Application(s) Topical daily  finasteride 5 milliGRAM(s) Oral daily  FLUoxetine 20 milliGRAM(s) Oral daily  heparin   Injectable 5000 Unit(s) SubCutaneous every 12 hours  influenza  Vaccine (HIGH DOSE) 0.7 milliLiter(s) IntraMuscular once  naloxone Injectable 0.4 milliGRAM(s) IV Push once  pantoprazole    Tablet 40 milliGRAM(s) Oral before breakfast  piperacillin/tazobactam IVPB.. 3.375 Gram(s) IV Intermittent every 8 hours  polyethylene glycol 3350 17 Gram(s) Oral daily  senna 2 Tablet(s) Oral at bedtime  simvastatin 40 milliGRAM(s) Oral at bedtime  tamsulosin 0.4 milliGRAM(s) Oral at bedtime  zoster Vaccine recombinant (SHINGRIX) 0.5 milliLiter(s) IntraMuscular once    MEDICATIONS  (PRN):  acetaminophen     Tablet .. 650 milliGRAM(s) Oral every 6 hours PRN Temp greater or equal to 38C (100.4F), Mild Pain (1 - 3)  aluminum hydroxide/magnesium hydroxide/simethicone Suspension 30 milliLiter(s) Oral every 4 hours PRN Dyspepsia  bisacodyl 5 milliGRAM(s) Oral daily PRN Constipation  melatonin 3 milliGRAM(s) Oral at bedtime PRN Insomnia  ondansetron Injectable 4 milliGRAM(s) IV Push every 8 hours PRN Nausea and/or Vomiting  oxyCODONE    IR 5 milliGRAM(s) Oral every 4 hours PRN Severe Pain (7 - 10)  oxyCODONE    IR 2.5 milliGRAM(s) Oral every 4 hours PRN Moderate Pain (4 - 6)      Objective:    Vitals: Vital Signs Last 24 Hrs  T(C): 36.5 (12-28-23 @ 04:15), Max: 36.7 (12-27-23 @ 14:41)  T(F): 97.7 (12-28-23 @ 04:15), Max: 98.1 (12-27-23 @ 21:30)  HR: 55 (12-28-23 @ 04:15) (50 - 60)  BP: 125/61 (12-28-23 @ 04:15) (91/52 - 125/61)  BP(mean): --  RR: 18 (12-28-23 @ 04:15) (16 - 18)  SpO2: 96% (12-28-23 @ 04:15) (96% - 97%)                I&O's Summary    27 Dec 2023 07:01  -  28 Dec 2023 07:00  --------------------------------------------------------  IN: 480 mL / OUT: 2350 mL / NET: -1870 mL        PHYSICAL EXAM:  GENERAL: NAD  HEAD:  Atraumatic, Normocephalic  EYES: EOMI, conjunctiva and sclera clear  CHEST/LUNG: Clear to auscultation bilaterally; No rales, rhonchi, wheezing, or rubs  HEART: Regular rate and rhythm; No murmurs, rubs, or gallops  ABDOMEN: Soft, Nontender, Nondistended;   SKIN: No rashes or lesions  NERVOUS SYSTEM:  Alert & Oriented X3, no focal deficits    LABS:                        10.0   4.58  )-----------( 218      ( 28 Dec 2023 04:37 )             30.5                         9.7    4.06  )-----------( 230      ( 27 Dec 2023 07:19 )             30.1                         10.4   4.31  )-----------( 241      ( 26 Dec 2023 07:03 )             32.4     12-28    141  |  108  |  7   ----------------------------<  93  3.5   |  26  |  1.04  12-27    137  |  104  |  9   ----------------------------<  96  3.7   |  22  |  1.13  12-26    140  |  105  |  8   ----------------------------<  71  4.0   |  23  |  1.05    Ca    8.4      28 Dec 2023 04:37  Ca    8.3<L>      27 Dec 2023 07:21  Ca    8.7      26 Dec 2023 07:08  Phos  3.4     12-28  Mg     1.9     12-28    TPro  5.4<L>  /  Alb  2.8<L>  /  TBili  0.4  /  DBili  x   /  AST  20  /  ALT  9<L>  /  AlkPhos  113  12-28  TPro  5.6<L>  /  Alb  3.1<L>  /  TBili  0.4  /  DBili  x   /  AST  19  /  ALT  7<L>  /  AlkPhos  128<H>  12-27  TPro  5.7<L>  /  Alb  3.1<L>  /  TBili  0.4  /  DBili  x   /  AST  21  /  ALT  11  /  AlkPhos  138<H>  12-26    CAPILLARY BLOOD GLUCOSE            Urinalysis Basic - ( 28 Dec 2023 04:37 )    Color: x / Appearance: x / SG: x / pH: x  Gluc: 93 mg/dL / Ketone: x  / Bili: x / Urobili: x   Blood: x / Protein: x / Nitrite: x   Leuk Esterase: x / RBC: x / WBC x   Sq Epi: x / Non Sq Epi: x / Bacteria: x          RADIOLOGY & ADDITIONAL TESTS:    Imaging Personally Reviewed:  [x ] YES  [ ] NO    Consultants involved in case:   Consultant(s) Notes Reviewed:  [ x] YES  [ ] NO:   Care Discussed with Consultants/Other Providers [x ] YES  [ ] NO

## 2023-12-28 NOTE — PROGRESS NOTE ADULT - PROBLEM SELECTOR PLAN 1
ua shows active sediment w large LE, pyuria, nitrites, bacteriuria; suggestive of uti  imaging shows "urinary bladder cystitis and ascending urinary tract infection. No CT evidence of pyelonephritis. No obstructing ureteral calculus or hydronephrosis."  browne in place  s/p zosyn in er  follow up urine cultures  empirical abx w zosyn for now; deescalate based on final urine c+s ua shows active sediment w large LE, pyuria, nitrites, bacteriuria; suggestive of uti  imaging shows "urinary bladder cystitis and ascending urinary tract infection. No CT evidence of pyelonephritis. No obstructing ureteral calculus or hydronephrosis."  browne in place  s/p zosyn in er  follow up urine cultures  empirical abx w zosyn for now; deescalate based on final urine c+s-> Will dc home on ciprofloxacin, risks and benefits of treatment discussed

## 2023-12-28 NOTE — PROGRESS NOTE ADULT - ASSESSMENT
84yo 68kg m w pmh htn, hld, cad s/p pci w stent, sah, gerd, depression, metastatic prostate ca, p/w abdominal pain; found to have outpatient imaging findings suggestive of proctitis w rectal perforation + ascending uti; sent to er and admitted to medicine for further mgmt.

## 2023-12-28 NOTE — PROGRESS NOTE ADULT - PROVIDER SPECIALTY LIST ADULT
Cardiology
Colorectal Surgery
Cardiology
Colorectal Surgery
Surgery
Internal Medicine

## 2023-12-28 NOTE — PROGRESS NOTE ADULT - PROBLEM SELECTOR PLAN 2
h/o s/p pci w stent  no clinft of acs  monitor for chest pain and ekg changes  cont home statin  hold home asa for now  cards following would appreciate recs h/o s/p pci w stent  no clinft of acs  monitor for chest pain and ekg changes  cont home statin  cont asp  cards following would appreciate recs

## 2023-12-28 NOTE — PROGRESS NOTE ADULT - NUTRITIONAL ASSESSMENT
This patient has been assessed with a concern for Malnutrition and has been determined to have a diagnosis/diagnoses of Severe protein-calorie malnutrition and Underweight (BMI < 19).    This patient is being managed with:   Diet Regular-  Entered: Dec 26 2023  1:34PM

## 2023-12-28 NOTE — PROGRESS NOTE ADULT - SUBJECTIVE AND OBJECTIVE BOX
DATE OF SERVICE: 12-28-23 @ 15:17    Patient is a 83y old  Male who presents with a chief complaint of abdominal pain (28 Dec 2023 07:09)      INTERVAL HISTORY: Denies chest pain and SOB, feels well    REVIEW OF SYSTEMS:  CONSTITUTIONAL: No weakness  EYES/ENT: No visual changes;  No throat pain   NECK: No pain or stiffness  RESPIRATORY: No cough, wheezing; No shortness of breath  CARDIOVASCULAR: No chest pain or palpitations  GASTROINTESTINAL: No abdominal  pain. No nausea, vomiting, or hematemesis  GENITOURINARY: No dysuria, frequency or hematuria  NEUROLOGICAL: No stroke like symptoms  SKIN: No rashes      	  MEDICATIONS:        PHYSICAL EXAM:  T(C): 36.7 (12-28-23 @ 12:35), Max: 36.7 (12-27-23 @ 21:30)  HR: 65 (12-28-23 @ 12:35) (50 - 65)  BP: 104/59 (12-28-23 @ 12:35) (91/52 - 125/61)  RR: 18 (12-28-23 @ 12:35) (18 - 18)  SpO2: 96% (12-28-23 @ 12:35) (96% - 96%)  Wt(kg): --  I&O's Summary    27 Dec 2023 07:01  -  28 Dec 2023 07:00  --------------------------------------------------------  IN: 480 mL / OUT: 2350 mL / NET: -1870 mL    28 Dec 2023 07:01  -  28 Dec 2023 15:17  --------------------------------------------------------  IN: 0 mL / OUT: 1350 mL / NET: -1350 mL          Appearance: In no distress	  HEENT:    PERRL, EOMI	  Cardiovascular:  S1 S2, No JVD  Respiratory: Lungs clear to auscultation	  Gastrointestinal:  Soft, Non-tender, + BS	  Vascularature:  No edema of LE  Psychiatric: Appropriate affect   Neuro: no acute focal deficits                               10.0   4.58  )-----------( 218      ( 28 Dec 2023 04:37 )             30.5     12-28    141  |  108  |  7   ----------------------------<  93  3.5   |  26  |  1.04    Ca    8.4      28 Dec 2023 04:37  Phos  3.4     12-28  Mg     1.9     12-28    TPro  5.4<L>  /  Alb  2.8<L>  /  TBili  0.4  /  DBili  x   /  AST  20  /  ALT  9<L>  /  AlkPhos  113  12-28        Labs personally reviewed      ASSESSMENT/PLAN: 	  82yo 68kg m w pmh htn, hld, cad s/p pci w stent, sah, gerd, depression, metastatic prostate ca, p/w abdominal pain; found to have outpatient imaging findings suggestive of proctitis w rectal perforation + ascending uti; sent to er and admitted to medicine for further mgmt.      Problem/Plan - 1:  ·  Problem: Rectal perforation.   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 Marked mid/lower rectal wall thickening, hypervascularity, and extraluminal air extending through the anal sphincter complex down to the anal verge. Given the absence of recent procedure in this location, findings are concerning for rectal perforation."  s/p zosyn in ER  Monitor for fever, worsening white count  serial abdominal exams, serial abdominal imaging as needed  npo/bowel rest for now; ivf + lytes as needed in the mean time  supportive care with analgesics, antiemetics, antipyretics as needed  empirical abx w zosyn for now   -CRS and Urology consulted recs appreciated- No acute intervention needed  - Heme/Onc rec pending    Problem/Plan - 2:  ·  Problem: Acute UTI.   ·  Plan: ua shows active sediment w large LE, pyuria, nitrites, bacteriuria; suggestive of uti  imaging shows "urinary bladder cystitis and ascending urinary tract infection. No CT evidence of pyelonephritis. No obstructing ureteral calculus or hydronephrosis."  browne in place- urology recs appreciated  s/p zosyn in er       Problem/Plan - 3:  ·  Problem: CAD (coronary artery disease).   ·  Plan: s/p PCI RCA  2011   c/w asa     Problem/Plan - 4:  ·  Problem: HLD (hyperlipidemia).   ·  Plan: c/w simvastatin.          JODY Cool [facility]  Cardiovascular Medicine  [address] [place] [address], [address]  Available via call or text on [employer]  Office: [phone]   DATE OF SERVICE: 12-28-23 @ 15:17    Patient is a 83y old  Male who presents with a chief complaint of abdominal pain (28 Dec 2023 07:09)      INTERVAL HISTORY: Denies chest pain and SOB, feels well    REVIEW OF SYSTEMS:  CONSTITUTIONAL: No weakness  EYES/ENT: No visual changes;  No throat pain   NECK: No pain or stiffness  RESPIRATORY: No cough, wheezing; No shortness of breath  CARDIOVASCULAR: No chest pain or palpitations  GASTROINTESTINAL: No abdominal  pain. No nausea, vomiting, or hematemesis  GENITOURINARY: No dysuria, frequency or hematuria  NEUROLOGICAL: No stroke like symptoms  SKIN: No rashes      	  MEDICATIONS:        PHYSICAL EXAM:  T(C): 36.7 (12-28-23 @ 12:35), Max: 36.7 (12-27-23 @ 21:30)  HR: 65 (12-28-23 @ 12:35) (50 - 65)  BP: 104/59 (12-28-23 @ 12:35) (91/52 - 125/61)  RR: 18 (12-28-23 @ 12:35) (18 - 18)  SpO2: 96% (12-28-23 @ 12:35) (96% - 96%)  Wt(kg): --  I&O's Summary    27 Dec 2023 07:01  -  28 Dec 2023 07:00  --------------------------------------------------------  IN: 480 mL / OUT: 2350 mL / NET: -1870 mL    28 Dec 2023 07:01  -  28 Dec 2023 15:17  --------------------------------------------------------  IN: 0 mL / OUT: 1350 mL / NET: -1350 mL          Appearance: In no distress	  HEENT:    PERRL, EOMI	  Cardiovascular:  S1 S2, No JVD  Respiratory: Lungs clear to auscultation	  Gastrointestinal:  Soft, Non-tender, + BS	  Vascularature:  No edema of LE  Psychiatric: Appropriate affect   Neuro: no acute focal deficits                               10.0   4.58  )-----------( 218      ( 28 Dec 2023 04:37 )             30.5     12-28    141  |  108  |  7   ----------------------------<  93  3.5   |  26  |  1.04    Ca    8.4      28 Dec 2023 04:37  Phos  3.4     12-28  Mg     1.9     12-28    TPro  5.4<L>  /  Alb  2.8<L>  /  TBili  0.4  /  DBili  x   /  AST  20  /  ALT  9<L>  /  AlkPhos  113  12-28        Labs personally reviewed      ASSESSMENT/PLAN: 	  84yo 68kg m w pmh htn, hld, cad s/p pci w stent, sah, gerd, depression, metastatic prostate ca, p/w abdominal pain; found to have outpatient imaging findings suggestive of proctitis w rectal perforation + ascending uti; sent to er and admitted to medicine for further mgmt.      Problem/Plan - 1:  ·  Problem: Rectal perforation.   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 Marked mid/lower rectal wall thickening, hypervascularity, and extraluminal air extending through the anal sphincter complex down to the anal verge. Given the absence of recent procedure in this location, findings are concerning for rectal perforation."  s/p zosyn in ER  Monitor for fever, worsening white count  serial abdominal exams, serial abdominal imaging as needed  npo/bowel rest for now; ivf + lytes as needed in the mean time  supportive care with analgesics, antiemetics, antipyretics as needed  empirical abx w zosyn for now   -CRS and Urology consulted recs appreciated- No acute intervention needed  - Heme/Onc rec pending    Problem/Plan - 2:  ·  Problem: Acute UTI.   ·  Plan: ua shows active sediment w large LE, pyuria, nitrites, bacteriuria; suggestive of uti  imaging shows "urinary bladder cystitis and ascending urinary tract infection. No CT evidence of pyelonephritis. No obstructing ureteral calculus or hydronephrosis."  browne in place- urology recs appreciated  s/p zosyn in er       Problem/Plan - 3:  ·  Problem: CAD (coronary artery disease).   ·  Plan: s/p PCI RCA  2011   c/w asa     Problem/Plan - 4:  ·  Problem: HLD (hyperlipidemia).   ·  Plan: c/w simvastatin.          JODY Cool [facility]  Cardiovascular Medicine  [address] [place] [address], [address]  Available via call or text on [employer] TE[employer]  Office: [phone]

## 2023-12-29 ENCOUNTER — NON-APPOINTMENT (OUTPATIENT)
Age: 83
End: 2023-12-29

## 2023-12-29 LAB
CULTURE RESULTS: SIGNIFICANT CHANGE UP
SPECIMEN SOURCE: SIGNIFICANT CHANGE UP

## 2024-01-01 ENCOUNTER — NON-APPOINTMENT (OUTPATIENT)
Age: 84
End: 2024-01-01

## 2024-01-08 ENCOUNTER — APPOINTMENT (OUTPATIENT)
Dept: CARDIOLOGY | Facility: CLINIC | Age: 84
End: 2024-01-08
Payer: MEDICARE

## 2024-01-08 VITALS
DIASTOLIC BLOOD PRESSURE: 72 MMHG | TEMPERATURE: 98.2 F | HEIGHT: 68 IN | HEART RATE: 73 BPM | RESPIRATION RATE: 17 BRPM | BODY MASS INDEX: 16.82 KG/M2 | SYSTOLIC BLOOD PRESSURE: 112 MMHG | WEIGHT: 111 LBS | OXYGEN SATURATION: 97 %

## 2024-01-08 DIAGNOSIS — E78.5 HYPERLIPIDEMIA, UNSPECIFIED: ICD-10-CM

## 2024-01-08 DIAGNOSIS — I48.0 PAROXYSMAL ATRIAL FIBRILLATION: ICD-10-CM

## 2024-01-08 DIAGNOSIS — N39.0 URINARY TRACT INFECTION, SITE NOT SPECIFIED: ICD-10-CM

## 2024-01-08 DIAGNOSIS — I10 ESSENTIAL (PRIMARY) HYPERTENSION: ICD-10-CM

## 2024-01-08 DIAGNOSIS — C61 MALIGNANT NEOPLASM OF PROSTATE: ICD-10-CM

## 2024-01-08 DIAGNOSIS — Z91.81 HISTORY OF FALLING: ICD-10-CM

## 2024-01-08 DIAGNOSIS — R63.4 ABNORMAL WEIGHT LOSS: ICD-10-CM

## 2024-01-08 DIAGNOSIS — R55 SYNCOPE AND COLLAPSE: ICD-10-CM

## 2024-01-08 DIAGNOSIS — I25.10 ATHEROSCLEROTIC HEART DISEASE OF NATIVE CORONARY ARTERY W/OUT ANGINA PECTORIS: ICD-10-CM

## 2024-01-08 PROCEDURE — 93241 XTRNL ECG REC>48HR<7D: CPT

## 2024-01-08 PROCEDURE — 93000 ELECTROCARDIOGRAM COMPLETE: CPT

## 2024-01-08 PROCEDURE — 99214 OFFICE O/P EST MOD 30 MIN: CPT | Mod: 25

## 2024-01-09 ENCOUNTER — LABORATORY RESULT (OUTPATIENT)
Age: 84
End: 2024-01-09

## 2024-01-09 LAB
ALBUMIN SERPL ELPH-MCNC: 3.6 G/DL
ALP BLD-CCNC: 109 U/L
ALT SERPL-CCNC: 16 U/L
ANION GAP SERPL CALC-SCNC: 13 MMOL/L
AST SERPL-CCNC: 28 U/L
BASOPHILS # BLD AUTO: 0.06 K/UL
BASOPHILS NFR BLD AUTO: 1.2 %
BILIRUB SERPL-MCNC: 0.2 MG/DL
BUN SERPL-MCNC: 13 MG/DL
CALCIUM SERPL-MCNC: 9.4 MG/DL
CHLORIDE SERPL-SCNC: 103 MMOL/L
CO2 SERPL-SCNC: 27 MMOL/L
CREAT SERPL-MCNC: 0.94 MG/DL
EGFR: 79 ML/MIN/1.73M2
EOSINOPHIL # BLD AUTO: 0.1 K/UL
EOSINOPHIL NFR BLD AUTO: 2 %
GLUCOSE SERPL-MCNC: 69 MG/DL
HCT VFR BLD CALC: 36.1 %
HGB BLD-MCNC: 11.4 G/DL
IMM GRANULOCYTES NFR BLD AUTO: 0.4 %
LYMPHOCYTES # BLD AUTO: 1.04 K/UL
LYMPHOCYTES NFR BLD AUTO: 20.6 %
MAN DIFF?: NORMAL
MCHC RBC-ENTMCNC: 30.9 PG
MCHC RBC-ENTMCNC: 31.6 GM/DL
MCV RBC AUTO: 97.8 FL
MONOCYTES # BLD AUTO: 0.35 K/UL
MONOCYTES NFR BLD AUTO: 6.9 %
NEUTROPHILS # BLD AUTO: 3.47 K/UL
NEUTROPHILS NFR BLD AUTO: 68.9 %
PLATELET # BLD AUTO: 343 K/UL
POTASSIUM SERPL-SCNC: 4.1 MMOL/L
PROT SERPL-MCNC: 7.2 G/DL
RBC # BLD: 3.69 M/UL
RBC # FLD: 16.9 %
SODIUM SERPL-SCNC: 143 MMOL/L
T4 FREE SERPL-MCNC: 0.9 NG/DL
TSH SERPL-ACNC: 12 UIU/ML
WBC # FLD AUTO: 5.04 K/UL

## 2024-01-09 RX ORDER — LEVOTHYROXINE SODIUM 0.03 MG/1
25 TABLET ORAL DAILY
Qty: 30 | Refills: 2 | Status: ACTIVE | COMMUNITY
Start: 2024-01-09 | End: 1900-01-01

## 2024-01-16 ENCOUNTER — NON-APPOINTMENT (OUTPATIENT)
Age: 84
End: 2024-01-16

## 2024-01-16 ENCOUNTER — RX RENEWAL (OUTPATIENT)
Age: 84
End: 2024-01-16

## 2024-01-16 ENCOUNTER — APPOINTMENT (OUTPATIENT)
Dept: UROLOGY | Facility: CLINIC | Age: 84
End: 2024-01-16

## 2024-01-17 ENCOUNTER — APPOINTMENT (OUTPATIENT)
Dept: UROLOGY | Facility: CLINIC | Age: 84
End: 2024-01-17

## 2024-01-17 ENCOUNTER — APPOINTMENT (OUTPATIENT)
Age: 84
End: 2024-01-17

## 2024-01-17 ENCOUNTER — NON-APPOINTMENT (OUTPATIENT)
Age: 84
End: 2024-01-17

## 2024-01-23 ENCOUNTER — APPOINTMENT (OUTPATIENT)
Dept: HEMATOLOGY ONCOLOGY | Facility: CLINIC | Age: 84
End: 2024-01-23

## 2024-02-05 ENCOUNTER — APPOINTMENT (OUTPATIENT)
Dept: ELECTROPHYSIOLOGY | Facility: CLINIC | Age: 84
End: 2024-02-05

## 2024-03-11 ENCOUNTER — APPOINTMENT (OUTPATIENT)
Dept: CARDIOLOGY | Facility: CLINIC | Age: 84
End: 2024-03-11

## 2024-03-15 ENCOUNTER — APPOINTMENT (OUTPATIENT)
Dept: UROLOGY | Facility: CLINIC | Age: 84
End: 2024-03-15
Payer: MEDICARE

## 2024-03-15 ENCOUNTER — OUTPATIENT (OUTPATIENT)
Dept: OUTPATIENT SERVICES | Facility: HOSPITAL | Age: 84
LOS: 1 days | End: 2024-03-15
Payer: MEDICARE

## 2024-03-15 ENCOUNTER — INPATIENT (INPATIENT)
Facility: HOSPITAL | Age: 84
LOS: 10 days | Discharge: NURSING, REHAB OR LONG-TERM CARE | End: 2024-03-26
Attending: STUDENT IN AN ORGANIZED HEALTH CARE EDUCATION/TRAINING PROGRAM | Admitting: STUDENT IN AN ORGANIZED HEALTH CARE EDUCATION/TRAINING PROGRAM
Payer: MEDICARE

## 2024-03-15 ENCOUNTER — APPOINTMENT (OUTPATIENT)
Dept: UROLOGY | Facility: CLINIC | Age: 84
End: 2024-03-15

## 2024-03-15 VITALS
OXYGEN SATURATION: 88 % | RESPIRATION RATE: 18 BRPM | SYSTOLIC BLOOD PRESSURE: 100 MMHG | TEMPERATURE: 98 F | DIASTOLIC BLOOD PRESSURE: 67 MMHG | HEART RATE: 144 BPM

## 2024-03-15 DIAGNOSIS — C61 MALIGNANT NEOPLASM OF PROSTATE: ICD-10-CM

## 2024-03-15 DIAGNOSIS — R33.9 RETENTION OF URINE, UNSPECIFIED: ICD-10-CM

## 2024-03-15 DIAGNOSIS — Z29.9 ENCOUNTER FOR PROPHYLACTIC MEASURES, UNSPECIFIED: ICD-10-CM

## 2024-03-15 DIAGNOSIS — R35.0 FREQUENCY OF MICTURITION: ICD-10-CM

## 2024-03-15 DIAGNOSIS — K21.9 GASTRO-ESOPHAGEAL REFLUX DISEASE WITHOUT ESOPHAGITIS: ICD-10-CM

## 2024-03-15 DIAGNOSIS — A41.9 SEPSIS, UNSPECIFIED ORGANISM: ICD-10-CM

## 2024-03-15 DIAGNOSIS — R50.9 FEVER, UNSPECIFIED: ICD-10-CM

## 2024-03-15 DIAGNOSIS — I25.10 ATHEROSCLEROTIC HEART DISEASE OF NATIVE CORONARY ARTERY WITHOUT ANGINA PECTORIS: ICD-10-CM

## 2024-03-15 LAB
ALBUMIN SERPL ELPH-MCNC: 3.8 G/DL — SIGNIFICANT CHANGE UP (ref 3.3–5)
ALP SERPL-CCNC: 117 U/L — SIGNIFICANT CHANGE UP (ref 40–120)
ALT FLD-CCNC: 19 U/L — SIGNIFICANT CHANGE UP (ref 4–41)
ANION GAP SERPL CALC-SCNC: 15 MMOL/L — HIGH (ref 7–14)
APPEARANCE UR: ABNORMAL
APTT BLD: 23.8 SEC — LOW (ref 24.5–35.6)
AST SERPL-CCNC: 35 U/L — SIGNIFICANT CHANGE UP (ref 4–40)
BACTERIA # UR AUTO: ABNORMAL /HPF
BASE EXCESS BLDV CALC-SCNC: 1 MMOL/L — SIGNIFICANT CHANGE UP (ref -2–3)
BASOPHILS # BLD AUTO: 0.01 K/UL — SIGNIFICANT CHANGE UP (ref 0–0.2)
BASOPHILS NFR BLD AUTO: 0.6 % — SIGNIFICANT CHANGE UP (ref 0–2)
BILIRUB SERPL-MCNC: 0.4 MG/DL — SIGNIFICANT CHANGE UP (ref 0.2–1.2)
BILIRUB UR-MCNC: NEGATIVE — SIGNIFICANT CHANGE UP
BLD GP AB SCN SERPL QL: NEGATIVE — SIGNIFICANT CHANGE UP
BLOOD GAS VENOUS COMPREHENSIVE RESULT: SIGNIFICANT CHANGE UP
BUN SERPL-MCNC: 29 MG/DL — HIGH (ref 7–23)
CALCIUM SERPL-MCNC: 9.3 MG/DL — SIGNIFICANT CHANGE UP (ref 8.4–10.5)
CHLORIDE BLDV-SCNC: 104 MMOL/L — SIGNIFICANT CHANGE UP (ref 96–108)
CHLORIDE SERPL-SCNC: 101 MMOL/L — SIGNIFICANT CHANGE UP (ref 98–107)
CO2 BLDV-SCNC: 26.4 MMOL/L — HIGH (ref 22–26)
CO2 SERPL-SCNC: 23 MMOL/L — SIGNIFICANT CHANGE UP (ref 22–31)
COLOR SPEC: YELLOW — SIGNIFICANT CHANGE UP
CREAT SERPL-MCNC: 1.28 MG/DL — SIGNIFICANT CHANGE UP (ref 0.5–1.3)
DIFF PNL FLD: ABNORMAL
EGFR: 55 ML/MIN/1.73M2 — LOW
EOSINOPHIL # BLD AUTO: 0.02 K/UL — SIGNIFICANT CHANGE UP (ref 0–0.5)
EOSINOPHIL NFR BLD AUTO: 1.2 % — SIGNIFICANT CHANGE UP (ref 0–6)
EPI CELLS # UR: PRESENT
FLUAV AG NPH QL: SIGNIFICANT CHANGE UP
FLUBV AG NPH QL: SIGNIFICANT CHANGE UP
GAS PNL BLDV: 135 MMOL/L — LOW (ref 136–145)
GLUCOSE BLDV-MCNC: 105 MG/DL — HIGH (ref 70–99)
GLUCOSE SERPL-MCNC: 108 MG/DL — HIGH (ref 70–99)
GLUCOSE UR QL: NEGATIVE MG/DL — SIGNIFICANT CHANGE UP
HCO3 BLDV-SCNC: 25 MMOL/L — SIGNIFICANT CHANGE UP (ref 22–29)
HCT VFR BLD CALC: 35.6 % — LOW (ref 39–50)
HCT VFR BLDA CALC: 36 % — LOW (ref 39–51)
HGB BLD CALC-MCNC: 12 G/DL — LOW (ref 12.6–17.4)
HGB BLD-MCNC: 11.7 G/DL — LOW (ref 13–17)
IANC: 1.34 K/UL — LOW (ref 1.8–7.4)
IMM GRANULOCYTES NFR BLD AUTO: 1.2 % — HIGH (ref 0–0.9)
INR BLD: 1.04 RATIO — SIGNIFICANT CHANGE UP (ref 0.85–1.18)
KETONES UR-MCNC: NEGATIVE MG/DL — SIGNIFICANT CHANGE UP
LACTATE BLDV-MCNC: 3.2 MMOL/L — HIGH (ref 0.5–2)
LEUKOCYTE ESTERASE UR-ACNC: ABNORMAL
LYMPHOCYTES # BLD AUTO: 0.21 K/UL — LOW (ref 1–3.3)
LYMPHOCYTES # BLD AUTO: 13 % — SIGNIFICANT CHANGE UP (ref 13–44)
MCHC RBC-ENTMCNC: 30.2 PG — SIGNIFICANT CHANGE UP (ref 27–34)
MCHC RBC-ENTMCNC: 32.9 GM/DL — SIGNIFICANT CHANGE UP (ref 32–36)
MCV RBC AUTO: 91.8 FL — SIGNIFICANT CHANGE UP (ref 80–100)
MONOCYTES # BLD AUTO: 0.01 K/UL — SIGNIFICANT CHANGE UP (ref 0–0.9)
MONOCYTES NFR BLD AUTO: 0.6 % — LOW (ref 2–14)
NEUTROPHILS # BLD AUTO: 1.34 K/UL — LOW (ref 1.8–7.4)
NEUTROPHILS NFR BLD AUTO: 83.4 % — HIGH (ref 43–77)
NITRITE UR-MCNC: NEGATIVE — SIGNIFICANT CHANGE UP
NRBC # BLD: 0 /100 WBCS — SIGNIFICANT CHANGE UP (ref 0–0)
NRBC # FLD: 0 K/UL — SIGNIFICANT CHANGE UP (ref 0–0)
PCO2 BLDV: 38 MMHG — LOW (ref 42–55)
PH BLDV: 7.43 — SIGNIFICANT CHANGE UP (ref 7.32–7.43)
PH UR: 6 — SIGNIFICANT CHANGE UP (ref 5–8)
PLATELET # BLD AUTO: 160 K/UL — SIGNIFICANT CHANGE UP (ref 150–400)
PO2 BLDV: 26 MMHG — SIGNIFICANT CHANGE UP (ref 25–45)
POTASSIUM BLDV-SCNC: 4.7 MMOL/L — SIGNIFICANT CHANGE UP (ref 3.5–5.1)
POTASSIUM SERPL-MCNC: 3.9 MMOL/L — SIGNIFICANT CHANGE UP (ref 3.5–5.3)
POTASSIUM SERPL-SCNC: 3.9 MMOL/L — SIGNIFICANT CHANGE UP (ref 3.5–5.3)
PROT SERPL-MCNC: 7.5 G/DL — SIGNIFICANT CHANGE UP (ref 6–8.3)
PROT UR-MCNC: 30 MG/DL
PROTHROM AB SERPL-ACNC: 11.7 SEC — SIGNIFICANT CHANGE UP (ref 9.5–13)
RBC # BLD: 3.88 M/UL — LOW (ref 4.2–5.8)
RBC # FLD: 14.3 % — SIGNIFICANT CHANGE UP (ref 10.3–14.5)
RBC CASTS # UR COMP ASSIST: SIGNIFICANT CHANGE UP /HPF (ref 0–4)
RH IG SCN BLD-IMP: POSITIVE — SIGNIFICANT CHANGE UP
RSV RNA NPH QL NAA+NON-PROBE: SIGNIFICANT CHANGE UP
SAO2 % BLDV: 40 % — LOW (ref 67–88)
SARS-COV-2 RNA SPEC QL NAA+PROBE: SIGNIFICANT CHANGE UP
SODIUM SERPL-SCNC: 139 MMOL/L — SIGNIFICANT CHANGE UP (ref 135–145)
SP GR SPEC: 1.01 — SIGNIFICANT CHANGE UP (ref 1–1.03)
UROBILINOGEN FLD QL: 0.2 MG/DL — SIGNIFICANT CHANGE UP (ref 0.2–1)
WBC # BLD: 1.61 K/UL — LOW (ref 3.8–10.5)
WBC # FLD AUTO: 1.61 K/UL — LOW (ref 3.8–10.5)
WBC UR QL: SIGNIFICANT CHANGE UP /HPF (ref 0–5)

## 2024-03-15 PROCEDURE — 74177 CT ABD & PELVIS W/CONTRAST: CPT | Mod: 26,MC

## 2024-03-15 PROCEDURE — 99291 CRITICAL CARE FIRST HOUR: CPT | Mod: GC

## 2024-03-15 PROCEDURE — 51702 INSERT TEMP BLADDER CATH: CPT

## 2024-03-15 PROCEDURE — 99291 CRITICAL CARE FIRST HOUR: CPT

## 2024-03-15 PROCEDURE — 99497 ADVNCD CARE PLAN 30 MIN: CPT | Mod: 25

## 2024-03-15 PROCEDURE — ZZZZZ: CPT

## 2024-03-15 PROCEDURE — 99223 1ST HOSP IP/OBS HIGH 75: CPT | Mod: GC

## 2024-03-15 PROCEDURE — 71045 X-RAY EXAM CHEST 1 VIEW: CPT | Mod: 26

## 2024-03-15 RX ORDER — PIPERACILLIN AND TAZOBACTAM 4; .5 G/20ML; G/20ML
3.38 INJECTION, POWDER, LYOPHILIZED, FOR SOLUTION INTRAVENOUS ONCE
Refills: 0 | Status: COMPLETED | OUTPATIENT
Start: 2024-03-15 | End: 2024-03-15

## 2024-03-15 RX ORDER — ONDANSETRON 8 MG/1
4 TABLET, FILM COATED ORAL EVERY 8 HOURS
Refills: 0 | Status: DISCONTINUED | OUTPATIENT
Start: 2024-03-15 | End: 2024-03-23

## 2024-03-15 RX ORDER — TAMSULOSIN HYDROCHLORIDE 0.4 MG/1
0.4 CAPSULE ORAL AT BEDTIME
Refills: 0 | Status: DISCONTINUED | OUTPATIENT
Start: 2024-03-15 | End: 2024-03-26

## 2024-03-15 RX ORDER — TAMSULOSIN HYDROCHLORIDE 0.4 MG/1
1 CAPSULE ORAL
Refills: 0 | DISCHARGE

## 2024-03-15 RX ORDER — VANCOMYCIN HCL 1 G
1000 VIAL (EA) INTRAVENOUS ONCE
Refills: 0 | Status: COMPLETED | OUTPATIENT
Start: 2024-03-15 | End: 2024-03-15

## 2024-03-15 RX ORDER — FLUOXETINE HCL 10 MG
1 CAPSULE ORAL
Refills: 0 | DISCHARGE

## 2024-03-15 RX ORDER — PIPERACILLIN AND TAZOBACTAM 4; .5 G/20ML; G/20ML
3.38 INJECTION, POWDER, LYOPHILIZED, FOR SOLUTION INTRAVENOUS ONCE
Refills: 0 | Status: COMPLETED | OUTPATIENT
Start: 2024-03-15 | End: 2024-03-16

## 2024-03-15 RX ORDER — SODIUM CHLORIDE 9 MG/ML
1000 INJECTION INTRAMUSCULAR; INTRAVENOUS; SUBCUTANEOUS ONCE
Refills: 0 | Status: COMPLETED | OUTPATIENT
Start: 2024-03-15 | End: 2024-03-15

## 2024-03-15 RX ORDER — FINASTERIDE 5 MG/1
1 TABLET, FILM COATED ORAL
Qty: 0 | Refills: 0 | DISCHARGE

## 2024-03-15 RX ORDER — DIPHENHYDRAMINE HYDROCHLORIDE 50 MG/ML
50 INJECTION, SOLUTION INTRAMUSCULAR; INTRAVENOUS
Qty: 1 | Refills: 0 | Status: COMPLETED | OUTPATIENT
Start: 2024-03-15

## 2024-03-15 RX ORDER — PANTOPRAZOLE SODIUM 20 MG/1
1 TABLET, DELAYED RELEASE ORAL
Refills: 0 | DISCHARGE

## 2024-03-15 RX ORDER — ACETAMINOPHEN 500 MG
1000 TABLET ORAL ONCE
Refills: 0 | Status: COMPLETED | OUTPATIENT
Start: 2024-03-15 | End: 2024-03-15

## 2024-03-15 RX ORDER — ASPIRIN/CALCIUM CARB/MAGNESIUM 324 MG
81 TABLET ORAL DAILY
Refills: 0 | Status: DISCONTINUED | OUTPATIENT
Start: 2024-03-15 | End: 2024-03-26

## 2024-03-15 RX ORDER — FINASTERIDE 5 MG/1
5 TABLET, FILM COATED ORAL DAILY
Refills: 0 | Status: DISCONTINUED | OUTPATIENT
Start: 2024-03-15 | End: 2024-03-26

## 2024-03-15 RX ORDER — SIMVASTATIN 20 MG/1
1 TABLET, FILM COATED ORAL
Refills: 0 | DISCHARGE

## 2024-03-15 RX ORDER — ASPIRIN/CALCIUM CARB/MAGNESIUM 324 MG
1 TABLET ORAL
Refills: 0 | DISCHARGE

## 2024-03-15 RX ORDER — SIMVASTATIN 20 MG/1
40 TABLET, FILM COATED ORAL AT BEDTIME
Refills: 0 | Status: DISCONTINUED | OUTPATIENT
Start: 2024-03-15 | End: 2024-03-16

## 2024-03-15 RX ORDER — LANOLIN ALCOHOL/MO/W.PET/CERES
3 CREAM (GRAM) TOPICAL AT BEDTIME
Refills: 0 | Status: DISCONTINUED | OUTPATIENT
Start: 2024-03-15 | End: 2024-03-26

## 2024-03-15 RX ORDER — HYDROCORTISONE 20 MG
100 TABLET ORAL ONCE
Refills: 0 | Status: COMPLETED | OUTPATIENT
Start: 2024-03-15 | End: 2024-03-15

## 2024-03-15 RX ORDER — LEUPROLIDE ACETATE 22.5 MG/.375ML
22.5 INJECTION, SUSPENSION, EXTENDED RELEASE SUBCUTANEOUS
Qty: 1 | Refills: 0 | Status: COMPLETED | OUTPATIENT
Start: 2023-12-15 | End: 2024-03-15

## 2024-03-15 RX ORDER — PIPERACILLIN AND TAZOBACTAM 4; .5 G/20ML; G/20ML
3.38 INJECTION, POWDER, LYOPHILIZED, FOR SOLUTION INTRAVENOUS EVERY 8 HOURS
Refills: 0 | Status: DISCONTINUED | OUTPATIENT
Start: 2024-03-16 | End: 2024-03-17

## 2024-03-15 RX ORDER — BICALUTAMIDE 50 MG/1
1 TABLET, FILM COATED ORAL
Refills: 0 | DISCHARGE

## 2024-03-15 RX ORDER — SODIUM CHLORIDE 9 MG/ML
500 INJECTION, SOLUTION INTRAVENOUS ONCE
Refills: 0 | Status: COMPLETED | OUTPATIENT
Start: 2024-03-15 | End: 2024-03-16

## 2024-03-15 RX ORDER — LEUPROLIDE ACETATE 22.5 MG/.375ML
22.5 INJECTION, SUSPENSION, EXTENDED RELEASE SUBCUTANEOUS
Refills: 0 | Status: COMPLETED | OUTPATIENT
Start: 2024-03-15

## 2024-03-15 RX ORDER — HEPARIN SODIUM 5000 [USP'U]/ML
5000 INJECTION INTRAVENOUS; SUBCUTANEOUS EVERY 12 HOURS
Refills: 0 | Status: DISCONTINUED | OUTPATIENT
Start: 2024-03-15 | End: 2024-03-16

## 2024-03-15 RX ORDER — CEFEPIME 1 G/1
INJECTION, POWDER, FOR SOLUTION INTRAMUSCULAR; INTRAVENOUS
Refills: 0 | Status: DISCONTINUED | OUTPATIENT
Start: 2024-03-15 | End: 2024-03-15

## 2024-03-15 RX ORDER — PANTOPRAZOLE SODIUM 20 MG/1
40 TABLET, DELAYED RELEASE ORAL
Refills: 0 | Status: DISCONTINUED | OUTPATIENT
Start: 2024-03-15 | End: 2024-03-26

## 2024-03-15 RX ORDER — ACETAMINOPHEN 500 MG
650 TABLET ORAL EVERY 6 HOURS
Refills: 0 | Status: DISCONTINUED | OUTPATIENT
Start: 2024-03-15 | End: 2024-03-26

## 2024-03-15 RX ADMIN — Medication 1000 MILLIGRAM(S): at 17:30

## 2024-03-15 RX ADMIN — DIPHENHYDRAMINE HYDROCHLORIDE 0 MG/ML: 50 INJECTION, SOLUTION INTRAMUSCULAR; INTRAVENOUS at 00:00

## 2024-03-15 RX ADMIN — LEUPROLIDE ACETATE 0 MG: 22.5 INJECTION, SUSPENSION, EXTENDED RELEASE SUBCUTANEOUS at 00:00

## 2024-03-15 RX ADMIN — Medication 250 MILLIGRAM(S): at 17:47

## 2024-03-15 RX ADMIN — Medication 100 MILLIGRAM(S): at 21:33

## 2024-03-15 RX ADMIN — PIPERACILLIN AND TAZOBACTAM 200 GRAM(S): 4; .5 INJECTION, POWDER, LYOPHILIZED, FOR SOLUTION INTRAVENOUS at 17:04

## 2024-03-15 RX ADMIN — Medication 400 MILLIGRAM(S): at 16:46

## 2024-03-15 RX ADMIN — SODIUM CHLORIDE 1000 MILLILITER(S): 9 INJECTION INTRAMUSCULAR; INTRAVENOUS; SUBCUTANEOUS at 19:26

## 2024-03-15 RX ADMIN — SODIUM CHLORIDE 1000 MILLILITER(S): 9 INJECTION INTRAMUSCULAR; INTRAVENOUS; SUBCUTANEOUS at 18:31

## 2024-03-15 RX ADMIN — SODIUM CHLORIDE 1000 MILLILITER(S): 9 INJECTION INTRAMUSCULAR; INTRAVENOUS; SUBCUTANEOUS at 16:46

## 2024-03-15 NOTE — CONSULT NOTE ADULT - CRITICAL CARE ATTENDING COMMENT
Medical management as above, reviewing chart and coordinating care with primary team/staff, as well as reviewing vitals, radiology, medication list, recent labs, and prior records.

## 2024-03-15 NOTE — H&P ADULT - NSHPPHYSICALEXAM_GEN_ALL_CORE
PHYSICAL EXAM    Vital Signs Last 24 Hrs  T(C): 36.9 (15 Mar 2024 22:08), Max: 40.2 (15 Mar 2024 15:50)  T(F): 98.5 (15 Mar 2024 22:08), Max: 104.4 (15 Mar 2024 15:50)  HR: 99 (15 Mar 2024 22:08) (97 - 144)  BP: 95/59 (15 Mar 2024 22:08) (88/52 - 113/76)  BP(mean): 71 (15 Mar 2024 20:00) (65 - 99)  RR: 20 (15 Mar 2024 22:08) (16 - 26)  SpO2: 98% (15 Mar 2024 22:08) (88% - 100%)    Parameters below as of 15 Mar 2024 22:08  Patient On (Oxygen Delivery Method): room air          GENERAL: NAD, lying comfortably in bed   HEAD:  Atraumatic, Normocephalic  EYES: EOMI b/l, PERRLA b/l, conjunctiva and sclera clear  NECK: Supple, No JVD, No LAD   CHEST/LUNG: Clear to auscultation bilaterally; No wheeze or rhonchi  HEART: Regular rate and rhythm; S1 and S2 present, No murmurs, rubs, or gallops  ABDOMEN: Soft, Nontender, Nondistended; Bowel sounds present  EXTREMITIES:  2+ Peripheral Pulses, No clubbing, cyanosis, or edema  NEURO: AAOx3, non-focal   SKIN: No rashes or lesions

## 2024-03-15 NOTE — H&P ADULT - NSHPLABSRESULTS_GEN_ALL_CORE
.  LABS:                         11.7   1.61  )-----------( 160      ( 15 Mar 2024 16:55 )             35.6     0315    139  |  101  |  29<H>  ----------------------------<  108<H>  3.9   |  23  |  1.28    Ca    9.3      15 Mar 2024 16:55    TPro  7.5  /  Alb  3.8  /  TBili  0.4  /  DBili  x   /  AST  35  /  ALT  19  /  AlkPhos  117  -15    PT/INR - ( 15 Mar 2024 16:55 )   PT: 11.7 sec;   INR: 1.04 ratio         PTT - ( 15 Mar 2024 16:55 )  PTT:23.8 sec  Urinalysis Basic - ( 15 Mar 2024 17:10 )    Color: Yellow / Appearance: Turbid / S.013 / pH: x  Gluc: x / Ketone: Negative mg/dL  / Bili: Negative / Urobili: 0.2 mg/dL   Blood: x / Protein: 30 mg/dL / Nitrite: Negative   Leuk Esterase: Moderate / RBC: 5-10 /HPF / WBC 20-25 /HPF   Sq Epi: x / Non Sq Epi: x / Bacteria: Few /HPF        Lactate, Blood: 2.7 mmol/L (03-15 @ 19:35)    < from: CT Abdomen and Pelvis w/ IV Cont (03.15.24 @ 17:34) >    IMPRESSION:    There is an apparent indeterminate right lower pole renal lesion   measuring 1.8 x 1.2 cm, which could represent focal pyelonephritis or a   mass Recommend further evaluation with contrast-enhanced MRI.    Asymmetric enhancement of the left common and superficial femoral vein   relative to the right is nonspecific and may represent differential   venous drainage, the underlying filling defect cannot be excluded. Left   lower extremity DVT ultrasound is suggested to exclude underlying DVT.    There is extensive blastic osseous metastatic disease at the axial and   appendicular skeleton again noted, likely related to metastatic prostate   cancer. The prostate gland is also markedly enlarged and there is loss of   fat plane between the posterior prostate and anterior rectumagain noted.    Additional chronic and incidental findings are present as detailed above.        --- End of Report ---      < end of copied text >

## 2024-03-15 NOTE — H&P ADULT - PROBLEM SELECTOR PLAN 3
-c/w ASA  -c/w home medication simvastatin Render In Strict Bullet Format?: No Continue Regimen: T gel shampoo 3 x a week Detail Level: Zone

## 2024-03-15 NOTE — ED PROVIDER NOTE - OBJECTIVE STATEMENT
84yo 68kg m w pmh htn, hld, cad s/p pci w stent, sah, gerd, depression, metastatic prostate ca, p/w  rash, rigors s/p prostate cancer injection. pt went for injection, removed browne, waiting for TOV, placed browne. pt then had rigors and that's when nursing staff noticed non painful nor pruritic rash on abdomen. was advised to come to ed. on arrival bp 100/67 RR 18  rectally 104, 96% RA. endorses lower abdominal pain, and has blood at urethral meatus where browne is. denies chest pain, SOB, wheezing, tongue swelling, nausea/vomiting.     of note on prior admission in december had proctitis w perforation and ascending UTI

## 2024-03-15 NOTE — CONSULT NOTE ADULT - SUBJECTIVE AND OBJECTIVE BOX
INTERVAL HPI/OVERNIGHT EVENTS:    SUBJECTIVE: Patient seen and examined at bedside.       VITAL SIGNS:  ICU Vital Signs Last 24 Hrs  T(C): 37.1 (15 Mar 2024 19:28), Max: 40.2 (15 Mar 2024 15:50)  T(F): 98.8 (15 Mar 2024 19:28), Max: 104.4 (15 Mar 2024 15:50)  HR: 99 (15 Mar 2024 20:00) (97 - 144)  BP: 96/60 (15 Mar 2024 20:00) (88/52 - 113/76)  BP(mean): 71 (15 Mar 2024 20:00) (65 - 99)  ABP: --  ABP(mean): --  RR: 20 (15 Mar 2024 20:00) (16 - 26)  SpO2: 97% (15 Mar 2024 20:00) (88% - 100%)    O2 Parameters below as of 15 Mar 2024 20:00  Patient On (Oxygen Delivery Method): room air            Plateau pressure:   P/F ratio:     -15 @ 07:01  -  03-15 @ 20:07  --------------------------------------------------------  IN: 0 mL / OUT: 200 mL / NET: -200 mL      CAPILLARY BLOOD GLUCOSE        ECG:    PHYSICAL EXAM:    General:   HEENT:   Neck:   Respiratory:   Cardiovascular:   Abdomen:   Extremities:  Neurological:    MEDICATIONS:  MEDICATIONS  (STANDING):    MEDICATIONS  (PRN):      ALLERGIES:  Allergies    No Known Allergies    Intolerances        LABS:                        11.7   1.61  )-----------( 160      ( 15 Mar 2024 16:55 )             35.6     03-15    139  |  101  |  29<H>  ----------------------------<  108<H>  3.9   |  23  |  1.28    Ca    9.3      15 Mar 2024 16:55    TPro  7.5  /  Alb  3.8  /  TBili  0.4  /  DBili  x   /  AST  35  /  ALT  19  /  AlkPhos  117  03-15    PT/INR - ( 15 Mar 2024 16:55 )   PT: 11.7 sec;   INR: 1.04 ratio         PTT - ( 15 Mar 2024 16:55 )  PTT:23.8 sec  Urinalysis Basic - ( 15 Mar 2024 17:10 )    Color: Yellow / Appearance: Turbid / S.013 / pH: x  Gluc: x / Ketone: Negative mg/dL  / Bili: Negative / Urobili: 0.2 mg/dL   Blood: x / Protein: 30 mg/dL / Nitrite: Negative   Leuk Esterase: Moderate / RBC: 5-10 /HPF / WBC 20-25 /HPF   Sq Epi: x / Non Sq Epi: x / Bacteria: Few /HPF        RADIOLOGY & ADDITIONAL TESTS: Reviewed. CHIEF COMPLAINT:    HPI:  Patient is an 84 y/o M with PMHx of HTN, HLD, CAD s/p PCI w/ stent, SAH, GERD, metastatic prostate cancer to bones/lung and local invasion to rectum,  urinary retention with chronic browne, recent admission to [facility] in 2023 for proctitis and UTI who presents from urologist Dr. Pedro' office with fever and chills. Patient went to his urologist today for TOV and 2nd injection for prostate cancer tx.  He was noted to be febrile and developed a rash around the injection site and over his abdomen shortly after injection was given. He was given benadryl for rash and was referred to ED. Rash is erythematous and nonpruritic. He has not started any new medications recently. Browne was exchanged at urologist's office.     Patient states he was in his usual state of health and feeling well prior to experiencing rigors today. Now complaining of leaking around browne catheter. Denies pruritus, cough, SOB, nausea, vomiting, diarrhea, changes in appetite.     At baseline, patient A&Ox3 and ambulates with walker. Daughter reports he sustained a fall 2 days ago while trying to ambulate without his walker and sustained a left elbow abrasion. Patient lives with his son. Son Shemar is HCP    In the ED, patient febrile to 104, tachycardic to 140s, hypotensive to 80s/50s. Patient was given 2 L IVF, vancomycin, and Zosyn. Labs remarkable for WBC 1.61, pH 7.43, lactate 3. UA with moderate LE, few bacteria, and moderate blood. CT A/P with right lower pole renal lesion measuring 1.8x1.2 cm, which could represent focal pyelonephritis or a mass, asymmetric enhancement of the left common and superficial femoral vein, LLE DVT US suggested to exclude underlying DVT. Urology consulted for leaking browne catheter. RVP negative. CXR clear lungs    PAST MEDICAL & SURGICAL HISTORY:  CAD (Coronary Artery Disease)      Coronary Stent  in       Hypercholesterolemia      BPH (Benign Prostatic Hypertrophy)      Depression      S/P Manipulation of Deviated Nasal Septum      History of Spinal Surgery  l/s spine          FAMILY HISTORY:      SOCIAL HISTORY:  Smoking: __ packs x ___ years  EtOH Use:  Marital Status:  Occupation:  Recent Travel:  Country of Birth:  Advance Directives:    Allergies    No Known Allergies    Intolerances        HOME MEDICATIONS:  -tamsulosin  -atorvastatin  -finasteride  -pantoprazole     REVIEW OF SYSTEMS:  Constitutional:   Eyes:  ENT:  CV:  Resp:  GI:  :  MSK:  Integumentary:  Neurological:  Psychiatric:  Endocrine:  Hematologic/Lymphatic:  Allergic/Immunologic:  [ ] All other systems negative  [ ] Unable to assess ROS because ________    OBJECTIVE:  ICU Vital Signs Last 24 Hrs  T(C): 37.1 (15 Mar 2024 19:28), Max: 40.2 (15 Mar 2024 15:50)  T(F): 98.8 (15 Mar 2024 19:28), Max: 104.4 (15 Mar 2024 15:50)  HR: 99 (15 Mar 2024 20:00) (97 - 144)  BP: 96/60 (15 Mar 2024 20:00) (88/52 - 113/76)  BP(mean): 71 (15 Mar 2024 20:00) (65 - 99)  ABP: --  ABP(mean): --  RR: 20 (15 Mar 2024 20:00) (16 - 26)  SpO2: 97% (15 Mar 2024 20:00) (88% - 100%)    O2 Parameters below as of 15 Mar 2024 20:00  Patient On (Oxygen Delivery Method): room air              03-15 @ 07:01  -  03-15 @ 20:46  --------------------------------------------------------  IN: 0 mL / OUT: 200 mL / NET: -200 mL      CAPILLARY BLOOD GLUCOSE          PHYSICAL EXAM:  Constitutional: NAD  HEENT: NCAT, EOMI,  Neck: no JVD, supple,   Chest: normal WOB at rest, CTAB  Heart: RRR, physiologic S1 and S2, no murmurs, no rubs, no gallops, 2+ radial pulses, no LE edema  Abd: nondistended, normoactive bowel sounds, soft, nontender, no rebound, no involuntary guarding  Extremities: left elbow abrasion  Neuro: alert, A&Ox3, no focal deficits  MSK: spontaneous movement of all 4 extremities  Skin: maculopapular rash over chestno rashes, left elbow abrasion. Warm    HOSPITAL MEDICATIONS:  MEDICATIONS  (STANDING):  hydrocortisone sodium succinate Injectable 100 milliGRAM(s) IV Push Once  piperacillin/tazobactam IVPB... 3.375 Gram(s) IV Intermittent once    MEDICATIONS  (PRN):      LABS:                        11.7   1.61  )-----------( 160      ( 15 Mar 2024 16:55 )             35.6     03-15    139  |  101  |  29<H>  ----------------------------<  108<H>  3.9   |  23  |  1.28    Ca    9.3      15 Mar 2024 16:55    TPro  7.5  /  Alb  3.8  /  TBili  0.4  /  DBili  x   /  AST  35  /  ALT  19  /  AlkPhos  117  15    PT/INR - ( 15 Mar 2024 16:55 )   PT: 11.7 sec;   INR: 1.04 ratio         PTT - ( 15 Mar 2024 16:55 )  PTT:23.8 sec  Urinalysis Basic - ( 15 Mar 2024 17:10 )    Color: Yellow / Appearance: Turbid / S.013 / pH: x  Gluc: x / Ketone: Negative mg/dL  / Bili: Negative / Urobili: 0.2 mg/dL   Blood: x / Protein: 30 mg/dL / Nitrite: Negative   Leuk Esterase: Moderate / RBC: 5-10 /HPF / WBC 20-25 /HPF   Sq Epi: x / Non Sq Epi: x / Bacteria: Few /HPF        Venous Blood Gas:  03-15 @ 16:57  7.43/38/26/25/40.0  VBG Lactate: 3.2      MICROBIOLOGY:     RADIOLOGY:  [ ] Reviewed and interpreted by me    EKG:

## 2024-03-15 NOTE — H&P ADULT - ASSESSMENT
84 y/o M with PMHx of HTN, HLD, CAD s/p PCI w/ stent, SAH, GERD, metastatic prostate cancer to bones/lung and local invasion to rectum, urinary retention with chronic browne, recent admission to [facility] in December 2023 for proctitis and UTI (urine culture grew Pseudomonas) who presents from urologist Dr. Pedro' office with fever and chills, febrile and hypotensive,  UA positive, admitted for sepsis

## 2024-03-15 NOTE — H&P ADULT - ATTENDING COMMENTS
83M with PMH of HTN, HLD, CAD (s/p PCI and stent), SAH, GERD, metastatic prostate cancer to bone and lung w/ local invasion to rectum and c/b urinary retention requiring chronic Snow presents for fevers/chills, rigors, and hypotension.    #Sepsis  SIRS+ with fever, leukopenia, and tachycardia, likely urinary source given chronic Snow and symptom onset after manipulation/exchange of Snow outpatient. Started on stress-dose steroids. Exchange Snow, cont Zosyn, f/u BCx/UCx and tailor antibiotics. Cont trending Lactate <2 and stress dose steroids for now until BP improves, taper as warranted. F/u MR to further delineate mass vs. pyelo    #r/o DVT  CT findings w/ c/f L femoral DVT. F/u DVT studies.

## 2024-03-15 NOTE — ED ADULT NURSE NOTE - NSFALLHARMRISKINTERV_ED_ALL_ED

## 2024-03-15 NOTE — ED PROVIDER NOTE - CLINICAL SUMMARY MEDICAL DECISION MAKING FREE TEXT BOX
82 yo m hx HTN CAD s/p PCI SAH metastatic prostate cancer to rectum presents w rash rigors after injection for cancer. also had browne removal, failed TOV, browne placed, then had rigors and rash and abdominal pain. pt himself only complaining of abdominal pain. has blood at urethral meatus. on arrival tachycardic 144 normotensive 96% on RA rectally febrile 104. PE + abdominal tenderness suprapubic region and blood near site of browne entry, active BM during examination. pt awake alert and responsive. time frame between injection and rash >1 hour, could be allergic reaction but more likely sepsis given rectal temp 104, abdomnial pain, due to instrumentation near rectal mass. also had history of rectal perforation CT to eval. IV abx, fluids, vanc/cefepime, 84 yo m hx HTN CAD s/p PCI SAH metastatic prostate cancer to rectum presents w rash rigors after injection for cancer. also had browne removal, failed TOV, browne placed, then had rigors and rash and abdominal pain. pt himself only complaining of abdominal pain. has blood at urethral meatus. on arrival tachycardic 144 normotensive 96% on RA rectally febrile 104. PE + abdominal tenderness suprapubic region and blood near site of browne entry, active BM during examination. pt awake alert and responsive. time frame between injection and rash >1 hour, could be allergic reaction but more likely sepsis given rectal temp 104, abdomnial pain, due to instrumentation near rectal mass. also had history of rectal perforation CT to eval. IV abx, fluids, vanc/zosyn given prior history of perf bowel

## 2024-03-15 NOTE — H&P ADULT - PROBLEM SELECTOR PLAN 5
- Fluids:   - Electrolytes: Will replete to maintain K>4, Phos>3, and Mag>2  - Nutrition: DASH  - Activity: as tolerated   - DVT Prophylaxis: duplex to r/o DVT, sub q heparin q 12  - Stress Ulcer/GI Prophylaxis: pantoprazole   - Disposition: pending medical management  - Ethics: full code

## 2024-03-15 NOTE — H&P ADULT - NSHPSOCIALHISTORY_GEN_ALL_CORE
Denied any drinking, former smoker. At baseline, patient A&Ox3 and ambulates with walker. Daughter reports he sustained a fall 2 days ago while trying to ambulate without his walker and sustained a left elbow abrasion. Patient lives with his son. Son Shemar is HCP. Denied any drinking, former smoker. At baseline, patient A&Ox3 and ambulates with walker. Daughter reports he sustained a fall 2 days ago while trying to ambulate without his walker and sustained a left elbow abrasion. Patient lives with his son. Pt reports he would want Shemar (son) or Yanely (daughter) as primary decision makers.

## 2024-03-15 NOTE — ED PROVIDER NOTE - NS ED MD DISPO DIVISION
Intermittent faint headache-now gone-it did start after hitting head but it is bare able. BP now 122/76. She did have faint headache this morning when checked BP at 160/90. This is the first time it's been this high. She thinks she freaked out over her left arm going to sleep and she was trying to shake it out. She notes that she does have anxiety. Left arm keeps going to sleep for the past three weeks. No other symptoms. Told her it may be a pinched nerve but I would run it past you. BP normal at home 110-130's/70-80's.       She does have appointment 8/14/2023 FREDIS

## 2024-03-15 NOTE — H&P ADULT - NSHPREVIEWOFSYSTEMS_GEN_ALL_CORE
Review of Systems:  Constitutional: No fever, No weight loss, good appetite/po intake  Head: No headache   Eyes: No blurry vision, No diplopia  Neuro: No tremors, No muscle weakness   Cardiovascular: No chest pain, No palpitations  Respiratory: No SOB, No cough  GI: No nausea, No vomiting, No diarrhea  : No dysuria, No hematuria  Skin: No rash  MSK: + joint pain   Psych: No depression  Heme: No abnormal bruising, no abnormal bleeding

## 2024-03-15 NOTE — ED PROVIDER NOTE - CARE PLAN
1 Principal Discharge DX:	Rash   Principal Discharge DX:	Fever  Secondary Diagnosis:	Other specified sepsis

## 2024-03-15 NOTE — ED ADULT NURSE NOTE - CHIEF COMPLAINT QUOTE
Patient brought to ER by EMS from [address] [place] [address] for  Patient went to have  2nd Prostate shot. The catheter was going to be changed to see if he could void, he failed. reinserted the catheter and he has an rash on his abdomen, At 3:05 PM 25mg IM Benadryl was given, rash diminished and patient was tremulous.

## 2024-03-15 NOTE — ED ADULT TRIAGE NOTE - CHIEF COMPLAINT QUOTE
Patient brought to ER by EMS from [address] [place] [address] for  Patient went to have  2nd Prostate shot. The catheter was going to be changed to see if he could void, he failed. reinserted the catheter and he has an rash on his abdomen, At 3:05 PM 25mg IM Benadryl was given, rash diminished and patient was tremulous.

## 2024-03-15 NOTE — H&P ADULT - HISTORY OF PRESENT ILLNESS
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 in December 2023 for proctitis and UTI who presents from urologist Dr. Pedro' office with fever and chills. Patient went to his urologist today for TOV and 2nd injection for prostate cancer tx. he failed TOV and browne was put back in. he feel cold, started to shake wildly in the urology office. was concerning for injection reaction, mild rash was noted around injection site and leg, afebrile but feels hot,  benadryl was given, no epi was given.  the duaghter requested amblance for him to be transferred to ED. He has not started any new medications recently. Daughter states he was in his usual state of health and feeling well otherwise previously. He endosred some back pain.  Denies CP, cough, SOB, nausea, vomiting, diarrhea, changes in appetite.     In the ED, patient febrile to 104, tachycardic to 140s, hypotensive to 80s/50s.  Labs remarkable for WBC 1.61, pH 7.43, lactate 3. UA with moderate LE, few bacteria, and moderate blood. CT A/P with right lower pole renal lesion measuring 1.8x1.2 cm, which could represent focal pyelonephritis or a mass, asymmetric enhancement of the left common and superficial femoral vein, LLE DVT US suggested to exclude underlying DVT. Urology consulted for leaking browne catheter. RVP negative. CXR clear lungs.Patient was given 2 L IVF, vancomycin, Solu-cortef *1, and Zosyn.

## 2024-03-15 NOTE — ED ADULT NURSE NOTE - OBJECTIVE STATEMENT
Patient presented to Ed with a chief complaint of rash after receiving chemo for the second time today. Patient also had browne removed at center to have a voiding trial and was unable to void therefore Browne was re-inserted prior to arrival. Patient is tremulous and lethargic at this time. MD at bedside    Patient is A/O x 4, NAD, skin intact, PERRLA, speech clear, neurologically intact with no deficits, strength b/l upper and lower extremities 5/5, sensation intact b/l upper and lower extremities,(+) tachycardia S1 and S2 heard with no murmurs radial and pedal pulses present, capillary refill <3 , lungs clear b/l lobes throughout with no adventitious sounds or accessory muscle use, even and unlabored SpO2 88% placed patient on 2L NC improved to 98% , abdomen soft and non-tender, bowel sounds heard x 4 quadrant, (+) blood around Browne catheter and catheter is leaking  no edema noted. S Safety maintained, bed in lowest position, call bell within reach, plan of care reviewed with patient , addressed all questions and concern, will continue to monitor patient.

## 2024-03-15 NOTE — CONSULT NOTE ADULT - ASSESSMENT
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 Pedro' office with fever and chills. Febrile and hypotensive in ED. s/p 2 L IVF. On exam, patient A&Ox3, following commands, abdomen nontender. BP 99/56, HR 99. UA positive. CT A/P with possible right focal pyelonephritis vs mass and abnormal enhancement of left common and superficial femoral vein.    #Sepsis   #UTI    Recommendations:  -continue Zosyn  -f/u blood cultures and urine culture  -urology consult for leaking browne  -would give another 500 cc of IVF. TTE 2023 EF 71%  -kidney US for evaluation of possible pyelonephritis vs mass  -LLE dopplers to r/o DVT      Plan discussed with attending. Patient is not a MICU candidate. Please reconsult as needed.

## 2024-03-15 NOTE — ED PROVIDER NOTE - ATTENDING CONTRIBUTION TO CARE
Dr. Morse:  I have personally performed a face to face bedside history and physical examination of this patient. I have discussed the history, examination, review of systems, assessment and plan of management with the resident. I have reviewed the electronic medical record and amended it to reflect my history, review of systems, physical exam, assessment and plan.    83M h/o HTN, HLD, CAD, SAH, metastatic prostate cancer, presents with rigors and transient rash on abdomen.  Tachy to 140's on arrival and Tm 104 in ED.  Pt was getting chemo for his prostate cancer today, and was having a trial of void (failed, Snow re-inserted), when he started rigoring and feeling unwell.   Unable to obtain ROS.  Pt was given Benadryl at the facility prior to ED arrival for possible rash/allergic reaction.    exam:  - sleepy   - tachy  - ctab  - abd soft, ntnd    A/P  - sepsis, eval source  - sepsis labs, CT abd/pelvis

## 2024-03-15 NOTE — H&P ADULT - PROBLEM SELECTOR PLAN 1
-pt has chronic browne since dx of prostate cancer, last urine culture grew pseudomonas, s/p abx steroid, and fluid in the ED, likely UTI/pyelonephritis   -TTE 2023 EF 71%     -f/u blood cultures and urine culture  -continue Zosyn,  deescalate based on final urine c+s-  -c/w fluid resuscitation 500cc, s/p 2L  -US kidney  -browne changed in the ED  -monitor VS -pt has chronic browne since dx of prostate cancer, last urine culture grew pseudomonas, s/p abx steroid, and fluid in the ED, likely UTI/pyelonephritis   -TTE 2023 EF 71%     -f/u blood cultures and urine culture  -continue Zosyn,  deescalate based on final urine c+s-  -c/w fluid resuscitation 500cc, s/p 2L  -c/w solu-cortef 50mg q8  -US kidney  -browne catheter change   -monitor VS

## 2024-03-15 NOTE — H&P ADULT - PROBLEM SELECTOR PLAN 2
outpatient mri prostate, biopsy + path reports, urology documentation reviewed known metastasis to bones, and rectal area.   currently on Hormone based chemotherapy including zytiga, casodex, eligard inj + prednisone for treatment;     -last Eligard injection 03/15/2024  -hold  zytiga, casodex, and prednisone iso acute infection  -cont home tamsulosin + finasteride  -Pain control  -outpatient urology follow up  for further mgmt

## 2024-03-15 NOTE — H&P ADULT - CONVERSATION DETAILS
Discussed with patient regarding diagnosis and plan of care. When asked about code status and resuscitation, patient reports that he would like "whatever is needed" to be done. When asked about vasopressors, patient replies that he is agreeable to pressors if needed. In the event that he would need a surrogate decision maker, patient states that he would want either his son (Shemar whom he lives with) or daughter (Yanely) to make decisions on his behalf.

## 2024-03-15 NOTE — ED PROVIDER NOTE - PHYSICAL EXAMINATION
GENERAL: ill appearing, pale  HEAD: normocephalic, atraumatic  CARDIAC: tachycardic, normal S1S2, no appreciable murmurs, 2+ pulses in UE/LE b/l  PULM: normal breath sounds, clear to ascultation bilaterally, no rales, rhonchi, wheezing  GI: abdomen nondistended, soft, nontender, no guarding, rebound tenderness  NEURO: no focal motor or sensory deficits  MSK: no peripheral edema, no calf tenderness b/l  SKIN: macular blanchable rash to abdomen chest

## 2024-03-16 PROBLEM — R33.9 URINARY RETENTION: Status: ACTIVE | Noted: 2024-03-16

## 2024-03-16 LAB
A1C WITH ESTIMATED AVERAGE GLUCOSE RESULT: 4.9 % — SIGNIFICANT CHANGE UP (ref 4–5.6)
ALBUMIN SERPL ELPH-MCNC: 3.3 G/DL — SIGNIFICANT CHANGE UP (ref 3.3–5)
ALP SERPL-CCNC: 162 U/L — HIGH (ref 40–120)
ALT FLD-CCNC: 201 U/L — HIGH (ref 4–41)
ANION GAP SERPL CALC-SCNC: 14 MMOL/L — SIGNIFICANT CHANGE UP (ref 7–14)
ANISOCYTOSIS BLD QL: SLIGHT — SIGNIFICANT CHANGE UP
APTT BLD: 74.8 SEC — HIGH (ref 24.5–35.6)
AST SERPL-CCNC: 300 U/L — HIGH (ref 4–40)
BASOPHILS # BLD AUTO: 0 K/UL — SIGNIFICANT CHANGE UP (ref 0–0.2)
BASOPHILS NFR BLD AUTO: 0 % — SIGNIFICANT CHANGE UP (ref 0–2)
BILIRUB SERPL-MCNC: 0.3 MG/DL — SIGNIFICANT CHANGE UP (ref 0.2–1.2)
BUN SERPL-MCNC: 28 MG/DL — HIGH (ref 7–23)
CALCIUM SERPL-MCNC: 8.2 MG/DL — LOW (ref 8.4–10.5)
CHLORIDE SERPL-SCNC: 104 MMOL/L — SIGNIFICANT CHANGE UP (ref 98–107)
CHOLEST SERPL-MCNC: 135 MG/DL — SIGNIFICANT CHANGE UP
CO2 SERPL-SCNC: 21 MMOL/L — LOW (ref 22–31)
CREAT SERPL-MCNC: 1.36 MG/DL — HIGH (ref 0.5–1.3)
EGFR: 51 ML/MIN/1.73M2 — LOW
EOSINOPHIL # BLD AUTO: 0 K/UL — SIGNIFICANT CHANGE UP (ref 0–0.5)
EOSINOPHIL NFR BLD AUTO: 0 % — SIGNIFICANT CHANGE UP (ref 0–6)
ESTIMATED AVERAGE GLUCOSE: 94 — SIGNIFICANT CHANGE UP
GLUCOSE SERPL-MCNC: 129 MG/DL — HIGH (ref 70–99)
HCT VFR BLD CALC: 25.9 % — LOW (ref 39–50)
HCT VFR BLD CALC: 30.2 % — LOW (ref 39–50)
HDLC SERPL-MCNC: 62 MG/DL — SIGNIFICANT CHANGE UP
HGB BLD-MCNC: 8.8 G/DL — LOW (ref 13–17)
HGB BLD-MCNC: 9.8 G/DL — LOW (ref 13–17)
IANC: 15.31 K/UL — HIGH (ref 1.8–7.4)
LACTATE SERPL-SCNC: 3.2 MMOL/L — HIGH (ref 0.5–2)
LIPID PNL WITH DIRECT LDL SERPL: 63 MG/DL — SIGNIFICANT CHANGE UP
LYMPHOCYTES # BLD AUTO: 0.42 K/UL — LOW (ref 1–3.3)
LYMPHOCYTES # BLD AUTO: 2.6 % — LOW (ref 13–44)
MACROCYTES BLD QL: SLIGHT — SIGNIFICANT CHANGE UP
MANUAL SMEAR VERIFICATION: SIGNIFICANT CHANGE UP
MCHC RBC-ENTMCNC: 30.2 PG — SIGNIFICANT CHANGE UP (ref 27–34)
MCHC RBC-ENTMCNC: 31 PG — SIGNIFICANT CHANGE UP (ref 27–34)
MCHC RBC-ENTMCNC: 32.5 GM/DL — SIGNIFICANT CHANGE UP (ref 32–36)
MCHC RBC-ENTMCNC: 34 GM/DL — SIGNIFICANT CHANGE UP (ref 32–36)
MCV RBC AUTO: 91.2 FL — SIGNIFICANT CHANGE UP (ref 80–100)
MCV RBC AUTO: 93.2 FL — SIGNIFICANT CHANGE UP (ref 80–100)
METAMYELOCYTES # FLD: 1.7 % — HIGH (ref 0–1)
MONOCYTES # BLD AUTO: 0.29 K/UL — SIGNIFICANT CHANGE UP (ref 0–0.9)
MONOCYTES NFR BLD AUTO: 1.8 % — LOW (ref 2–14)
NEUTROPHILS # BLD AUTO: 14.95 K/UL — HIGH (ref 1.8–7.4)
NEUTROPHILS NFR BLD AUTO: 88.7 % — HIGH (ref 43–77)
NEUTS BAND # BLD: 3.5 % — SIGNIFICANT CHANGE UP (ref 0–6)
NON HDL CHOLESTEROL: 73 MG/DL — SIGNIFICANT CHANGE UP
NRBC # BLD: 0 /100 WBCS — SIGNIFICANT CHANGE UP (ref 0–0)
NRBC # FLD: 0 K/UL — SIGNIFICANT CHANGE UP (ref 0–0)
PLAT MORPH BLD: NORMAL — SIGNIFICANT CHANGE UP
PLATELET # BLD AUTO: 131 K/UL — LOW (ref 150–400)
PLATELET # BLD AUTO: 142 K/UL — LOW (ref 150–400)
PLATELET COUNT - ESTIMATE: ABNORMAL
POLYCHROMASIA BLD QL SMEAR: SLIGHT — SIGNIFICANT CHANGE UP
POTASSIUM SERPL-MCNC: 3.6 MMOL/L — SIGNIFICANT CHANGE UP (ref 3.5–5.3)
POTASSIUM SERPL-SCNC: 3.6 MMOL/L — SIGNIFICANT CHANGE UP (ref 3.5–5.3)
PROT SERPL-MCNC: 6.4 G/DL — SIGNIFICANT CHANGE UP (ref 6–8.3)
RBC # BLD: 2.84 M/UL — LOW (ref 4.2–5.8)
RBC # BLD: 3.24 M/UL — LOW (ref 4.2–5.8)
RBC # FLD: 14.5 % — SIGNIFICANT CHANGE UP (ref 10.3–14.5)
RBC # FLD: 14.6 % — HIGH (ref 10.3–14.5)
RBC BLD AUTO: NORMAL — SIGNIFICANT CHANGE UP
SODIUM SERPL-SCNC: 139 MMOL/L — SIGNIFICANT CHANGE UP (ref 135–145)
TRIGL SERPL-MCNC: 51 MG/DL — SIGNIFICANT CHANGE UP
VARIANT LYMPHS # BLD: 1.7 % — SIGNIFICANT CHANGE UP (ref 0–6)
WBC # BLD: 16.21 K/UL — HIGH (ref 3.8–10.5)
WBC # BLD: 18.69 K/UL — HIGH (ref 3.8–10.5)
WBC # FLD AUTO: 16.21 K/UL — HIGH (ref 3.8–10.5)
WBC # FLD AUTO: 18.69 K/UL — HIGH (ref 3.8–10.5)

## 2024-03-16 PROCEDURE — 99233 SBSQ HOSP IP/OBS HIGH 50: CPT

## 2024-03-16 PROCEDURE — 93970 EXTREMITY STUDY: CPT | Mod: 26

## 2024-03-16 PROCEDURE — 76770 US EXAM ABDO BACK WALL COMP: CPT | Mod: 26

## 2024-03-16 RX ORDER — SODIUM CHLORIDE 9 MG/ML
500 INJECTION, SOLUTION INTRAVENOUS ONCE
Refills: 0 | Status: COMPLETED | OUTPATIENT
Start: 2024-03-16 | End: 2024-03-16

## 2024-03-16 RX ORDER — HEPARIN SODIUM 5000 [USP'U]/ML
2000 INJECTION INTRAVENOUS; SUBCUTANEOUS EVERY 6 HOURS
Refills: 0 | Status: DISCONTINUED | OUTPATIENT
Start: 2024-03-16 | End: 2024-03-16

## 2024-03-16 RX ORDER — ENOXAPARIN SODIUM 100 MG/ML
60 INJECTION SUBCUTANEOUS EVERY 12 HOURS
Refills: 0 | Status: DISCONTINUED | OUTPATIENT
Start: 2024-03-16 | End: 2024-03-18

## 2024-03-16 RX ORDER — HEPARIN SODIUM 5000 [USP'U]/ML
4500 INJECTION INTRAVENOUS; SUBCUTANEOUS EVERY 6 HOURS
Refills: 0 | Status: DISCONTINUED | OUTPATIENT
Start: 2024-03-16 | End: 2024-03-16

## 2024-03-16 RX ORDER — HEPARIN SODIUM 5000 [USP'U]/ML
4500 INJECTION INTRAVENOUS; SUBCUTANEOUS ONCE
Refills: 0 | Status: COMPLETED | OUTPATIENT
Start: 2024-03-16 | End: 2024-03-16

## 2024-03-16 RX ORDER — SIMVASTATIN 20 MG/1
40 TABLET, FILM COATED ORAL AT BEDTIME
Refills: 0 | Status: DISCONTINUED | OUTPATIENT
Start: 2024-03-16 | End: 2024-03-26

## 2024-03-16 RX ORDER — HYDROCORTISONE 20 MG
50 TABLET ORAL EVERY 8 HOURS
Refills: 0 | Status: DISCONTINUED | OUTPATIENT
Start: 2024-03-16 | End: 2024-03-18

## 2024-03-16 RX ORDER — OXYBUTYNIN CHLORIDE 5 MG
5 TABLET ORAL THREE TIMES A DAY
Refills: 0 | Status: DISCONTINUED | OUTPATIENT
Start: 2024-03-16 | End: 2024-03-26

## 2024-03-16 RX ORDER — HEPARIN SODIUM 5000 [USP'U]/ML
INJECTION INTRAVENOUS; SUBCUTANEOUS
Qty: 25000 | Refills: 0 | Status: DISCONTINUED | OUTPATIENT
Start: 2024-03-16 | End: 2024-03-16

## 2024-03-16 RX ADMIN — Medication 50 MILLIGRAM(S): at 14:26

## 2024-03-16 RX ADMIN — Medication 81 MILLIGRAM(S): at 14:25

## 2024-03-16 RX ADMIN — PIPERACILLIN AND TAZOBACTAM 25 GRAM(S): 4; .5 INJECTION, POWDER, LYOPHILIZED, FOR SOLUTION INTRAVENOUS at 21:39

## 2024-03-16 RX ADMIN — SODIUM CHLORIDE 1000 MILLILITER(S): 9 INJECTION, SOLUTION INTRAVENOUS at 21:29

## 2024-03-16 RX ADMIN — SODIUM CHLORIDE 1000 MILLILITER(S): 9 INJECTION, SOLUTION INTRAVENOUS at 00:30

## 2024-03-16 RX ADMIN — HEPARIN SODIUM 1100 UNIT(S)/HR: 5000 INJECTION INTRAVENOUS; SUBCUTANEOUS at 14:16

## 2024-03-16 RX ADMIN — ENOXAPARIN SODIUM 60 MILLIGRAM(S): 100 INJECTION SUBCUTANEOUS at 17:34

## 2024-03-16 RX ADMIN — HEPARIN SODIUM 4500 UNIT(S): 5000 INJECTION INTRAVENOUS; SUBCUTANEOUS at 14:16

## 2024-03-16 RX ADMIN — PIPERACILLIN AND TAZOBACTAM 25 GRAM(S): 4; .5 INJECTION, POWDER, LYOPHILIZED, FOR SOLUTION INTRAVENOUS at 05:35

## 2024-03-16 RX ADMIN — Medication 50 MILLIGRAM(S): at 05:35

## 2024-03-16 RX ADMIN — TAMSULOSIN HYDROCHLORIDE 0.4 MILLIGRAM(S): 0.4 CAPSULE ORAL at 21:40

## 2024-03-16 RX ADMIN — Medication 5 MILLIGRAM(S): at 21:43

## 2024-03-16 RX ADMIN — Medication 50 MILLIGRAM(S): at 21:46

## 2024-03-16 RX ADMIN — HEPARIN SODIUM 5000 UNIT(S): 5000 INJECTION INTRAVENOUS; SUBCUTANEOUS at 05:35

## 2024-03-16 RX ADMIN — PANTOPRAZOLE SODIUM 40 MILLIGRAM(S): 20 TABLET, DELAYED RELEASE ORAL at 05:35

## 2024-03-16 RX ADMIN — SIMVASTATIN 40 MILLIGRAM(S): 20 TABLET, FILM COATED ORAL at 21:39

## 2024-03-16 RX ADMIN — PIPERACILLIN AND TAZOBACTAM 25 GRAM(S): 4; .5 INJECTION, POWDER, LYOPHILIZED, FOR SOLUTION INTRAVENOUS at 14:15

## 2024-03-16 RX ADMIN — PIPERACILLIN AND TAZOBACTAM 25 GRAM(S): 4; .5 INJECTION, POWDER, LYOPHILIZED, FOR SOLUTION INTRAVENOUS at 00:32

## 2024-03-16 RX ADMIN — FINASTERIDE 5 MILLIGRAM(S): 5 TABLET, FILM COATED ORAL at 14:25

## 2024-03-16 RX ADMIN — SODIUM CHLORIDE 500 MILLILITER(S): 9 INJECTION, SOLUTION INTRAVENOUS at 15:06

## 2024-03-16 RX ADMIN — SODIUM CHLORIDE 1000 MILLILITER(S): 9 INJECTION, SOLUTION INTRAVENOUS at 03:20

## 2024-03-16 NOTE — PATIENT PROFILE ADULT - FALL HARM RISK - HARM RISK INTERVENTIONS

## 2024-03-16 NOTE — PROGRESS NOTE ADULT - PROBLEM SELECTOR PLAN 1
-pt has chronic browne since dx of prostate cancer, last urine culture grew pseudomonas, s/p abx steroid, and fluid in the ED, likely UTI/pyelonephritis   -TTE 2023 EF 71%     -f/u blood cultures and urine culture  -continue Zosyn,  deescalate based on final urine c+s-  -c/w fluid resuscitation 500cc, s/p 2L  -c/w solu-cortef 50mg q8  -US kidney  -browne catheter change   -monitor VS

## 2024-03-16 NOTE — PATIENT PROFILE ADULT - FUNCTIONAL ASSESSMENT - BASIC MOBILITY 6.
1-calculated by average/Not able to assess (calculate score using [facility] averaging method)

## 2024-03-16 NOTE — PATIENT PROFILE ADULT - VISION (WITH CORRECTIVE LENSES IF THE PATIENT USUALLY WEARS THEM):
Abdominal Pain, N/V/D Partially impaired: cannot see medication labels or newsprint, but can see obstacles in path, and the surrounding layout; can count fingers at arm's length

## 2024-03-16 NOTE — ED ADULT NURSE REASSESSMENT NOTE - NS ED NURSE REASSESS COMMENT FT1
Mobile Critical Care RN: At bedside to assess pt, pt alert, oriented, at baseline mental status per daughter Yanely also at bedside. Vitals as noted. Pt received with second liter of NS running. Snow catheter bag emptied of 200 ml. MICU team evaluated pt, not a MICU candidate at this time. Pt endorsed to primary ED RN.
PT has been hypotensive while in ED. Given 500 bolus of NS as well as 500 bolus of LR. PTs trending BPs have been low. Report given to floor and they would not accept PT due to low BP. MD Glass notified and stated it was okay for patient to go up to the floor with current trend of BP. Stated he will put in order for more fluids. Safety precautions in place.
PT states he was thirsty, previously failed dysphagia screening. Screening repeated and PT tolerated fluids. Admitting previously at bedside gave the okay for PT to have water. Respirations even and unlabored. TBA, awaiting bed assignment. Safety precautions in place.
Patient remains hypotensive but is A/O x 4, calm and cooperative, speaking in full sentences. Snow Catheter leaking urine but there is no discomfort at the site. MD aware. Will hang second bolus as per MD order and re-evaluate

## 2024-03-16 NOTE — PATIENT PROFILE ADULT - HAVE YOU RECENTLY LOST WEIGHT WITHOUT TRYING?
Bita Lopez is a 89 y.o. woman with history of HTN, CAD, pacemaker, Anxiety, spastic bladder, and hysterectomy who presents to the ED with 3 days of nausea and vomiting. The patient denies current nausea and states that her last episode of vomiting was this morning. The patient states that her last bowel movement was yesterday and she is unsure when she last passed flatus.     The patient denies any associated abdominal pain, fever, or chills.     Discussed situation with nephew, Alexey Lopez, who makes medical decisions for the patient.     Unable to get medication reconciliation upon initial interview. Patient did not know medications and nephew was also unsure. Medicine reconciliation for this admission based on admission from 10/2018. Bita Lopez is a 89 y.o. woman with history of HTN, CAD, pacemaker, Anxiety, spastic bladder, and hysterectomy who presents to the ED with 3 days of nausea and vomiting. The patient denies current nausea and states that her last episode of vomiting was this morning. The patient states that her last bowel movement was yesterday and she is unsure when she last passed flatus.     The patient denies any associated abdominal pain, fever, or chills.     Discussed situation with nephew, Alexey Lopez, who makes medical decisions for the patient.     Unable to get medication reconciliation upon initial interview. Patient did not know medications and nephew was also unsure. Medicine reconciliation for this admission based on admission from 10/2018. Nephew states that the patient has an existing DNR order. Per the patient's wishes, she would accept surgery if there was a chance of recovery. However, if her heart were to stop in the OR or during the perioperative period, she would not want CPR. Medications in order to support her blood pressure would be acceptable to the patient. This is also documented in the patient's operative consent. No (0)

## 2024-03-16 NOTE — PHYSICAL THERAPY INITIAL EVALUATION ADULT - PERTINENT HX OF CURRENT PROBLEM, REHAB EVAL
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 Pedro' office with fever and chills. Patient went to his urologist today for TOV and 2nd injection for prostate cancer tx. he failed TOV and browne was put back in. he feel cold, started to shake wildly in the urology office. was concerning for injection reaction, mild rash was noted around injection site and leg, afebrile but feels hot,  benadryl was given, no epi was given.  the duaghter requested amblance for him to be transferred to ED. He has not started any new medications recently. Daughter states he was in his usual state of health and feeling well otherwise previously. He endosred some back pain.  Denies CP, cough, SOB, nausea, vomiting, diarrhea, changes in appetite.

## 2024-03-16 NOTE — PHYSICAL THERAPY INITIAL EVALUATION ADULT - LEVEL OF INDEPENDENCE, REHAB EVAL
Pt soiled.  Pt engaged in Passive Rom exercises of lower extremities.  Will follow up later with functional activities if time permits./maximum assist (25% patients effort)

## 2024-03-16 NOTE — PROGRESS NOTE ADULT - ATTENDING COMMENTS
Initial attending contact date 3/16/24. See resident note written above for details. I reviewed the resident documentation. I have personally seen and examined this patient. I reviewed vitals, labs, medications, and additional imaging. I agree with the above resident's findings and plans as written above with the following additions/statements.    84 yo M CAD s/p PCI, metastatic prostate cancer admitted with urosepsis/pyelonephritis, course complicated by acute LLE DVT. Received pulse dose steroid   - c/w Zosyn (previously grew pan sensitive pseudomonas), f/u UCx/BCx  - Start full dose Lovenox   - will likely need vascular medicine consult for AC management (likely unprovoked DVT) Initial attending contact date 3/16/24. See resident note written above for details. I reviewed the resident documentation. I have personally seen and examined this patient. I reviewed vitals, labs, medications, and additional imaging. I agree with the above resident's findings and plans as written above with the following additions/statements.    84 yo M CAD s/p PCI, metastatic prostate cancer admitted with urosepsis/pyelonephritis, course complicated by acute LLE DVT. Received pulse dose steroid   - c/w Zosyn (previously grew pan sensitive pseudomonas), f/u UCx/BCx  - Start full dose Lovenox   - will likely need vascular medicine consult for AC management (likely unprovoked DVT)  - Transaminitis worsening today, possibly due to Zosyn. Will hold simvastatin

## 2024-03-16 NOTE — HISTORY OF PRESENT ILLNESS
[FreeTextEntry1] : referred for evaluation of an elevated PSA presently 83; had been 35 2/23 and 11.8 2021 never had a PNB before. has lost 10+ lbs though his appetite is down and complains of LBP for the past month. he is on finasterdie and tamsulosin for LUTs.  he notes frequency every 2 hiurs, some urgency and can have leakage with nocturia 3-4 times. he FOS slower with some hesitancy and intermittency. No h/o retention, hematuria or UTIs.  PVR 8cc.   12/23 - MRI noted large PIRADS 5 lesion with pelvic lymphadenopathy and + bones mets. still with browne  here with daughter to start ADT   3/24 on ADT plus biclutamide unable to start abiraterone given cost issues. tolerating ADT OK

## 2024-03-16 NOTE — PROGRESS NOTE ADULT - SUBJECTIVE AND OBJECTIVE BOX
Frances Palomo, PGY1    Patient is a 83y old  Male who presents with a chief complaint of chills (15 Mar 2024 23:35)      SUBJECTIVE / OVERNIGHT EVENTS: NAEO. Pt denies chest pain, SOB, N/V, fever/chills, or changes in bowel movements.    MEDICATIONS  (STANDING):  aspirin enteric coated 81 milliGRAM(s) Oral daily  finasteride 5 milliGRAM(s) Oral daily  heparin   Injectable 5000 Unit(s) SubCutaneous every 12 hours  hydrocortisone sodium succinate Injectable 50 milliGRAM(s) IV Push every 8 hours  pantoprazole    Tablet 40 milliGRAM(s) Oral before breakfast  piperacillin/tazobactam IVPB.. 3.375 Gram(s) IV Intermittent every 8 hours  simvastatin 40 milliGRAM(s) Oral at bedtime  tamsulosin 0.4 milliGRAM(s) Oral at bedtime    MEDICATIONS  (PRN):  acetaminophen     Tablet .. 650 milliGRAM(s) Oral every 6 hours PRN Temp greater or equal to 38C (100.4F), Mild Pain (1 - 3)  aluminum hydroxide/magnesium hydroxide/simethicone Suspension 30 milliLiter(s) Oral every 4 hours PRN Dyspepsia  melatonin 3 milliGRAM(s) Oral at bedtime PRN Insomnia  ondansetron Injectable 4 milliGRAM(s) IV Push every 8 hours PRN Nausea and/or Vomiting      CAPILLARY BLOOD GLUCOSE        I&O's Summary    15 Mar 2024 07:01  -  16 Mar 2024 07:00  --------------------------------------------------------  IN: 0 mL / OUT: 200 mL / NET: -200 mL        Vital Signs Last 24 Hrs  T(C): 36.3 (16 Mar 2024 05:17), Max: 40.2 (15 Mar 2024 15:50)  T(F): 97.4 (16 Mar 2024 05:17), Max: 104.4 (15 Mar 2024 15:50)  HR: 69 (16 Mar 2024 05:17) (68 - 144)  BP: 97/55 (16 Mar 2024 05:17) (85/50 - 113/76)  BP(mean): 67 (16 Mar 2024 02:34) (65 - 99)  RR: 18 (16 Mar 2024 05:17) (16 - 26)  SpO2: 94% (16 Mar 2024 05:17) (88% - 100%)    Parameters below as of 16 Mar 2024 05:17  Patient On (Oxygen Delivery Method): room air        PHYSICAL EXAM:  GENERAL: NAD, well-developed, well-nourished  HEAD: Atraumatic, Normocephalic  EYES: Conjunctiva and sclera clear  CHEST/LUNG: Clear to auscultation bilaterally; No wheezes or crackles  HEART: Normal S1/S2; Regular rate and rhythm; No murmurs, rubs, or gallops  ABDOMEN: Soft, Nontender, Nondistended; Bowel sounds present  EXTREMITIES: No clubbing, cyanosis, or edema  PSYCH: A&Ox3      LABS:                        11.7   1.61  )-----------( 160      ( 15 Mar 2024 16:55 )             35.6      03-15    139  |  101  |  29<H>  ----------------------------<  108<H>  3.9   |  23  |  1.28    Ca    9.3      15 Mar 2024 16:55    TPro  7.5  /  Alb  3.8  /  TBili  0.4  /  DBili  x   /  AST  35  /  ALT  19  /  AlkPhos  117  03-15    PT/INR - ( 15 Mar 2024 16:55 )   PT: 11.7 sec;   INR: 1.04 ratio         PTT - ( 15 Mar 2024 16:55 )  PTT:23.8 sec      Urinalysis Basic - ( 15 Mar 2024 17:10 )    Color: Yellow / Appearance: Turbid / S.013 / pH: x  Gluc: x / Ketone: Negative mg/dL  / Bili: Negative / Urobili: 0.2 mg/dL   Blood: x / Protein: 30 mg/dL / Nitrite: Negative   Leuk Esterase: Moderate / RBC: 5-10 /HPF / WBC 20-25 /HPF   Sq Epi: x / Non Sq Epi: x / Bacteria: Few /HPF        RADIOLOGY & ADDITIONAL TESTS:

## 2024-03-16 NOTE — PROGRESS NOTE ADULT - SUBJECTIVE AND OBJECTIVE BOX
Frances Palomo, PGY1    Patient is a 83y old  Male who presents with a chief complaint of chills (15 Mar 2024 23:35)      SUBJECTIVE / OVERNIGHT EVENTS: NAEO. Pt denies chest pain, SOB, N/V, fever/chills, or changes in bowel movements.    MEDICATIONS  (STANDING):  aspirin enteric coated 81 milliGRAM(s) Oral daily  finasteride 5 milliGRAM(s) Oral daily  heparin   Injectable 5000 Unit(s) SubCutaneous every 12 hours  hydrocortisone sodium succinate Injectable 50 milliGRAM(s) IV Push every 8 hours  pantoprazole    Tablet 40 milliGRAM(s) Oral before breakfast  piperacillin/tazobactam IVPB.. 3.375 Gram(s) IV Intermittent every 8 hours  simvastatin 40 milliGRAM(s) Oral at bedtime  tamsulosin 0.4 milliGRAM(s) Oral at bedtime    MEDICATIONS  (PRN):  acetaminophen     Tablet .. 650 milliGRAM(s) Oral every 6 hours PRN Temp greater or equal to 38C (100.4F), Mild Pain (1 - 3)  aluminum hydroxide/magnesium hydroxide/simethicone Suspension 30 milliLiter(s) Oral every 4 hours PRN Dyspepsia  melatonin 3 milliGRAM(s) Oral at bedtime PRN Insomnia  ondansetron Injectable 4 milliGRAM(s) IV Push every 8 hours PRN Nausea and/or Vomiting      CAPILLARY BLOOD GLUCOSE        I&O's Summary    15 Mar 2024 07:01  -  16 Mar 2024 07:00  --------------------------------------------------------  IN: 0 mL / OUT: 200 mL / NET: -200 mL        Vital Signs Last 24 Hrs  T(C): 36.3 (16 Mar 2024 05:17), Max: 40.2 (15 Mar 2024 15:50)  T(F): 97.4 (16 Mar 2024 05:17), Max: 104.4 (15 Mar 2024 15:50)  HR: 69 (16 Mar 2024 05:17) (68 - 144)  BP: 97/55 (16 Mar 2024 05:17) (85/50 - 113/76)  BP(mean): 67 (16 Mar 2024 02:34) (65 - 99)  RR: 18 (16 Mar 2024 05:17) (16 - 26)  SpO2: 94% (16 Mar 2024 05:17) (88% - 100%)    Parameters below as of 16 Mar 2024 05:17  Patient On (Oxygen Delivery Method): room air        PHYSICAL EXAM:  GENERAL: NAD, well-developed, well-nourished  HEAD: Atraumatic, Normocephalic  EYES: Conjunctiva and sclera clear  CHEST/LUNG: Clear to auscultation bilaterally; No wheezes or crackles  HEART: Normal S1/S2; Regular rate and rhythm; No murmurs, rubs, or gallops  ABDOMEN: Soft, Nontender, Nondistended; Bowel sounds present  EXTREMITIES: No clubbing, cyanosis, or edema  PSYCH: A&Ox3      LABS:                        9.8    16. )-----------( 142      ( 16 Mar 2024 09:50 )             30.2     16 Mar 2024 09:50    139    |  104    |  28     ----------------------------<  129    3.6     |  21     |  1.36     Ca    8.2        16 Mar 2024 09:50    TPro  6.4    /  Alb  3.3    /  TBili  0.3    /  DBili  x      /  AST  300    /  ALT  201    /  AlkPhos  162    16 Mar 2024 09:50    PT/INR - ( 15 Mar 2024 16:55 )   PT: 11.7 sec;   INR: 1.04 ratio         PTT - ( 15 Mar 2024 16:55 )  PTT:23.8 sec      Urinalysis Basic - ( 15 Mar 2024 17:10 )    Color: Yellow / Appearance: Turbid / S.013 / pH: x  Gluc: x / Ketone: Negative mg/dL  / Bili: Negative / Urobili: 0.2 mg/dL   Blood: x / Protein: 30 mg/dL / Nitrite: Negative   Leuk Esterase: Moderate / RBC: 5-10 /HPF / WBC 20-25 /HPF   Sq Epi: x / Non Sq Epi: x / Bacteria: Few /HPF        RADIOLOGY & ADDITIONAL TESTS:

## 2024-03-16 NOTE — ASSESSMENT
[FreeTextEntry1] : plan for blood work and family will pay for abiraterone via [facility]. underwent TOIV but unable to void and browne replaced - started shaking - unclear if sepsis versus allergic reaction to eligard as nkoed rash. transported to [facility] via ambulance

## 2024-03-17 LAB
ALBUMIN SERPL ELPH-MCNC: 2.8 G/DL — LOW (ref 3.3–5)
ALP SERPL-CCNC: 110 U/L — SIGNIFICANT CHANGE UP (ref 40–120)
ALT FLD-CCNC: 111 U/L — HIGH (ref 4–41)
ANION GAP SERPL CALC-SCNC: 10 MMOL/L — SIGNIFICANT CHANGE UP (ref 7–14)
APTT BLD: 34.7 SEC — SIGNIFICANT CHANGE UP (ref 24.5–35.6)
AST SERPL-CCNC: 140 U/L — HIGH (ref 4–40)
BASOPHILS # BLD AUTO: 0.02 K/UL — SIGNIFICANT CHANGE UP (ref 0–0.2)
BASOPHILS NFR BLD AUTO: 0.1 % — SIGNIFICANT CHANGE UP (ref 0–2)
BILIRUB SERPL-MCNC: 0.2 MG/DL — SIGNIFICANT CHANGE UP (ref 0.2–1.2)
BUN SERPL-MCNC: 28 MG/DL — HIGH (ref 7–23)
CALCIUM SERPL-MCNC: 8 MG/DL — LOW (ref 8.4–10.5)
CHLORIDE SERPL-SCNC: 107 MMOL/L — SIGNIFICANT CHANGE UP (ref 98–107)
CO2 SERPL-SCNC: 23 MMOL/L — SIGNIFICANT CHANGE UP (ref 22–31)
CREAT SERPL-MCNC: 1.16 MG/DL — SIGNIFICANT CHANGE UP (ref 0.5–1.3)
EGFR: 62 ML/MIN/1.73M2 — SIGNIFICANT CHANGE UP
EOSINOPHIL # BLD AUTO: 0 K/UL — SIGNIFICANT CHANGE UP (ref 0–0.5)
EOSINOPHIL NFR BLD AUTO: 0 % — SIGNIFICANT CHANGE UP (ref 0–6)
GLUCOSE SERPL-MCNC: 122 MG/DL — HIGH (ref 70–99)
HCT VFR BLD CALC: 25.9 % — LOW (ref 39–50)
HGB BLD-MCNC: 8.9 G/DL — LOW (ref 13–17)
IANC: 14.99 K/UL — HIGH (ref 1.8–7.4)
IMM GRANULOCYTES NFR BLD AUTO: 7 % — HIGH (ref 0–0.9)
INR BLD: 1.22 RATIO — HIGH (ref 0.85–1.18)
LYMPHOCYTES # BLD AUTO: 0.65 K/UL — LOW (ref 1–3.3)
LYMPHOCYTES # BLD AUTO: 3.6 % — LOW (ref 13–44)
MAGNESIUM SERPL-MCNC: 2.1 MG/DL — SIGNIFICANT CHANGE UP (ref 1.6–2.6)
MCHC RBC-ENTMCNC: 31.2 PG — SIGNIFICANT CHANGE UP (ref 27–34)
MCHC RBC-ENTMCNC: 34.4 GM/DL — SIGNIFICANT CHANGE UP (ref 32–36)
MCV RBC AUTO: 90.9 FL — SIGNIFICANT CHANGE UP (ref 80–100)
MONOCYTES # BLD AUTO: 1.03 K/UL — HIGH (ref 0–0.9)
MONOCYTES NFR BLD AUTO: 5.7 % — SIGNIFICANT CHANGE UP (ref 2–14)
NEUTROPHILS # BLD AUTO: 14.99 K/UL — HIGH (ref 1.8–7.4)
NEUTROPHILS NFR BLD AUTO: 83.6 % — HIGH (ref 43–77)
NRBC # BLD: 0 /100 WBCS — SIGNIFICANT CHANGE UP (ref 0–0)
NRBC # FLD: 0 K/UL — SIGNIFICANT CHANGE UP (ref 0–0)
PHOSPHATE SERPL-MCNC: 3.2 MG/DL — SIGNIFICANT CHANGE UP (ref 2.5–4.5)
PLATELET # BLD AUTO: 143 K/UL — LOW (ref 150–400)
POTASSIUM SERPL-MCNC: 3 MMOL/L — LOW (ref 3.5–5.3)
POTASSIUM SERPL-SCNC: 3 MMOL/L — LOW (ref 3.5–5.3)
PROT SERPL-MCNC: 5.8 G/DL — LOW (ref 6–8.3)
PROTHROM AB SERPL-ACNC: 13.7 SEC — HIGH (ref 9.5–13)
RBC # BLD: 2.85 M/UL — LOW (ref 4.2–5.8)
RBC # FLD: 14.2 % — SIGNIFICANT CHANGE UP (ref 10.3–14.5)
SODIUM SERPL-SCNC: 140 MMOL/L — SIGNIFICANT CHANGE UP (ref 135–145)
WBC # BLD: 17.94 K/UL — HIGH (ref 3.8–10.5)
WBC # FLD AUTO: 17.94 K/UL — HIGH (ref 3.8–10.5)

## 2024-03-17 PROCEDURE — 99233 SBSQ HOSP IP/OBS HIGH 50: CPT

## 2024-03-17 RX ORDER — POTASSIUM CHLORIDE 20 MEQ
40 PACKET (EA) ORAL EVERY 4 HOURS
Refills: 0 | Status: COMPLETED | OUTPATIENT
Start: 2024-03-17 | End: 2024-03-17

## 2024-03-17 RX ORDER — SODIUM CHLORIDE 9 MG/ML
1000 INJECTION, SOLUTION INTRAVENOUS
Refills: 0 | Status: DISCONTINUED | OUTPATIENT
Start: 2024-03-17 | End: 2024-03-23

## 2024-03-17 RX ORDER — MEROPENEM 1 G/30ML
1000 INJECTION INTRAVENOUS EVERY 12 HOURS
Refills: 0 | Status: DISCONTINUED | OUTPATIENT
Start: 2024-03-17 | End: 2024-03-18

## 2024-03-17 RX ORDER — MEROPENEM 1 G/30ML
INJECTION INTRAVENOUS
Refills: 0 | Status: DISCONTINUED | OUTPATIENT
Start: 2024-03-17 | End: 2024-03-18

## 2024-03-17 RX ORDER — MEROPENEM 1 G/30ML
1000 INJECTION INTRAVENOUS ONCE
Refills: 0 | Status: COMPLETED | OUTPATIENT
Start: 2024-03-17 | End: 2024-03-17

## 2024-03-17 RX ADMIN — Medication 50 MILLIGRAM(S): at 12:32

## 2024-03-17 RX ADMIN — Medication 5 MILLIGRAM(S): at 05:39

## 2024-03-17 RX ADMIN — Medication 40 MILLIEQUIVALENT(S): at 17:48

## 2024-03-17 RX ADMIN — Medication 5 MILLIGRAM(S): at 22:08

## 2024-03-17 RX ADMIN — ENOXAPARIN SODIUM 60 MILLIGRAM(S): 100 INJECTION SUBCUTANEOUS at 17:50

## 2024-03-17 RX ADMIN — FINASTERIDE 5 MILLIGRAM(S): 5 TABLET, FILM COATED ORAL at 11:14

## 2024-03-17 RX ADMIN — Medication 40 MILLIEQUIVALENT(S): at 12:32

## 2024-03-17 RX ADMIN — TAMSULOSIN HYDROCHLORIDE 0.4 MILLIGRAM(S): 0.4 CAPSULE ORAL at 22:09

## 2024-03-17 RX ADMIN — MEROPENEM 100 MILLIGRAM(S): 1 INJECTION INTRAVENOUS at 17:48

## 2024-03-17 RX ADMIN — PIPERACILLIN AND TAZOBACTAM 25 GRAM(S): 4; .5 INJECTION, POWDER, LYOPHILIZED, FOR SOLUTION INTRAVENOUS at 05:35

## 2024-03-17 RX ADMIN — Medication 50 MILLIGRAM(S): at 22:08

## 2024-03-17 RX ADMIN — Medication 50 MILLIGRAM(S): at 05:35

## 2024-03-17 RX ADMIN — SODIUM CHLORIDE 75 MILLILITER(S): 9 INJECTION, SOLUTION INTRAVENOUS at 10:32

## 2024-03-17 RX ADMIN — Medication 81 MILLIGRAM(S): at 11:14

## 2024-03-17 RX ADMIN — SIMVASTATIN 40 MILLIGRAM(S): 20 TABLET, FILM COATED ORAL at 22:08

## 2024-03-17 RX ADMIN — Medication 5 MILLIGRAM(S): at 11:14

## 2024-03-17 RX ADMIN — MEROPENEM 100 MILLIGRAM(S): 1 INJECTION INTRAVENOUS at 10:26

## 2024-03-17 RX ADMIN — Medication 40 MILLIEQUIVALENT(S): at 22:11

## 2024-03-17 RX ADMIN — PANTOPRAZOLE SODIUM 40 MILLIGRAM(S): 20 TABLET, DELAYED RELEASE ORAL at 06:25

## 2024-03-17 RX ADMIN — ENOXAPARIN SODIUM 60 MILLIGRAM(S): 100 INJECTION SUBCUTANEOUS at 05:39

## 2024-03-17 NOTE — PROGRESS NOTE ADULT - PROBLEM SELECTOR PLAN 1
-pt has chronic browne since dx of prostate cancer, last urine culture grew pseudomonas, s/p abx steroid, and fluid in the ED, likely UTI/pyelonephritis   -TTE 2023 EF 71%     -f/u blood cultures and urine culture  -continue Zosyn,  deescalate based on final urine c+s-  -c/w fluid resuscitation 500cc, s/p 2L  -c/w solu-cortef 50mg q8  -US kidney  -browne catheter change   -monitor VS -pt has chronic browne since dx of prostate cancer, last urine culture grew pseudomonas, s/p abx steroid, and fluid in the ED, likely UTI/pyelonephritis   -TTE 2023 EF 71%     -f/u blood cultures and urine culture  -Zosyn (dced 3/17),  deescalate based on final urine c+s-  -started meropenem (3/17-) as WBC trending up and BPs softer  -started mIVF  -c/w fluid resuscitation 500cc, s/p 2L  -c/w solu-cortef 50mg q8  -US kidney w/ R renal pole lesion, pending MRI  -browne catheter change   -monitor VS

## 2024-03-17 NOTE — PROGRESS NOTE ADULT - PROBLEM SELECTOR PROBLEM 5
Prophylactic measure age(85 years old or older)/bones(Osteoporosis,prev fx,steroid use,metastatic bone ca)/coagulation(Bleeding disorder R/T clinical cond/anti-coags)

## 2024-03-17 NOTE — OCCUPATIONAL THERAPY INITIAL EVALUATION ADULT - PERTINENT HX OF CURRENT PROBLEM, REHAB EVAL
Pt is an 84 y/o M with PMH of HTN, HLD, CAD s/p PCI w/ stent, SAH, GERD, metastatic prostate cancer to bones/lung and local invasion to rectum, urinary retention with chronic browne, recent admission to [facility] in December 2023 for proctitis and UTI (urine culture grew Pseudomonas) who presents from urologist Dr. Pedro' office with fever and chills, febrile and hypotensive,  UA positive, admitted for sepsis

## 2024-03-17 NOTE — OCCUPATIONAL THERAPY INITIAL EVALUATION ADULT - LIVES WITH, PROFILE
[FreeTextEntry1] : I, Foster Arriaga, acted solely as a scribe for Dr. Jhonny Tamayo DO on this date 03/01/2023  .\par  \par All medical record entries made by the Scribe were at my, Dr. Jhonny Tamayo DO, direction and personally dictated by me on 03/01/2023 . I have reviewed the chart and agree that the record accurately reflects my personal performance of the history, physical exam, assessment and plan. I have also personally directed, reviewed, and agreed with the chart. 
sons in a house with 12 steps to manage./children

## 2024-03-17 NOTE — OCCUPATIONAL THERAPY INITIAL EVALUATION ADULT - GENERAL OBSERVATIONS, REHAB EVAL
Patient found semi-reclined in bed, NAD, and able to follow directions. Vitals: HR . Patient agreeable to participate in skilled OT evaluation.

## 2024-03-17 NOTE — PROGRESS NOTE ADULT - SUBJECTIVE AND OBJECTIVE BOX
Patient is a 83y old  Male who presents with a chief complaint of chills (15 Mar 2024 23:35)      SUBJECTIVE / OVERNIGHT EVENTS: NAEO. Pt denies chest pain, SOB, N/V, fever/chills, or changes in bowel movements.    MEDICATIONS  (STANDING):  aspirin enteric coated 81 milliGRAM(s) Oral daily  finasteride 5 milliGRAM(s) Oral daily  heparin   Injectable 5000 Unit(s) SubCutaneous every 12 hours  hydrocortisone sodium succinate Injectable 50 milliGRAM(s) IV Push every 8 hours  pantoprazole    Tablet 40 milliGRAM(s) Oral before breakfast  piperacillin/tazobactam IVPB.. 3.375 Gram(s) IV Intermittent every 8 hours  simvastatin 40 milliGRAM(s) Oral at bedtime  tamsulosin 0.4 milliGRAM(s) Oral at bedtime    MEDICATIONS  (PRN):  acetaminophen     Tablet .. 650 milliGRAM(s) Oral every 6 hours PRN Temp greater or equal to 38C (100.4F), Mild Pain (1 - 3)  aluminum hydroxide/magnesium hydroxide/simethicone Suspension 30 milliLiter(s) Oral every 4 hours PRN Dyspepsia  melatonin 3 milliGRAM(s) Oral at bedtime PRN Insomnia  ondansetron Injectable 4 milliGRAM(s) IV Push every 8 hours PRN Nausea and/or Vomiting      CAPILLARY BLOOD GLUCOSE        I&O's Summary    15 Mar 2024 07:01  -  16 Mar 2024 07:00  --------------------------------------------------------  IN: 0 mL / OUT: 200 mL / NET: -200 mL        Vital Signs Last 24 Hrs  T(C): 36.3 (16 Mar 2024 05:17), Max: 40.2 (15 Mar 2024 15:50)  T(F): 97.4 (16 Mar 2024 05:17), Max: 104.4 (15 Mar 2024 15:50)  HR: 69 (16 Mar 2024 05:17) (68 - 144)  BP: 97/55 (16 Mar 2024 05:17) (85/50 - 113/76)  BP(mean): 67 (16 Mar 2024 02:34) (65 - 99)  RR: 18 (16 Mar 2024 05:17) (16 - 26)  SpO2: 94% (16 Mar 2024 05:17) (88% - 100%)    Parameters below as of 16 Mar 2024 05:17  Patient On (Oxygen Delivery Method): room air        PHYSICAL EXAM:  GENERAL: NAD, well-developed, well-nourished  HEAD: Atraumatic, Normocephalic  EYES: Conjunctiva and sclera clear  CHEST/LUNG: Clear to auscultation bilaterally; No wheezes or crackles  HEART: Normal S1/S2; Regular rate and rhythm; No murmurs, rubs, or gallops  ABDOMEN: Soft, Nontender, Nondistended; Bowel sounds present  EXTREMITIES: No clubbing, cyanosis, or edema  PSYCH: A&Ox3      LABS:                        9.8    16.21 )-----------( 142      ( 16 Mar 2024 09:50 )             30.2     16 Mar 2024 09:50    139    |  104    |  28     ----------------------------<  129    3.6     |  21     |  1.36     Ca    8.2        16 Mar 2024 09:50    TPro  6.4    /  Alb  3.3    /  TBili  0.3    /  DBili  x      /  AST  300    /  ALT  201    /  AlkPhos  162    16 Mar 2024 09:50    PT/INR - ( 15 Mar 2024 16:55 )   PT: 11.7 sec;   INR: 1.04 ratio         PTT - ( 15 Mar 2024 16:55 )  PTT:23.8 sec      Urinalysis Basic - ( 15 Mar 2024 17:10 )    Color: Yellow / Appearance: Turbid / S.013 / pH: x  Gluc: x / Ketone: Negative mg/dL  / Bili: Negative / Urobili: 0.2 mg/dL   Blood: x / Protein: 30 mg/dL / Nitrite: Negative   Leuk Esterase: Moderate / RBC: 5-10 /HPF / WBC 20-25 /HPF   Sq Epi: x / Non Sq Epi: x / Bacteria: Few /HPF        RADIOLOGY & ADDITIONAL TESTS:       Patient is a 83y old  Male who presents with a chief complaint of chills (15 Mar 2024 23:35)      SUBJECTIVE / OVERNIGHT EVENTS: NAEON. Pt denies chest pain, SOB, N/V, fever/chills, or changes in bowel movements.    MEDICATIONS  (STANDING):  aspirin enteric coated 81 milliGRAM(s) Oral daily  finasteride 5 milliGRAM(s) Oral daily  heparin   Injectable 5000 Unit(s) SubCutaneous every 12 hours  hydrocortisone sodium succinate Injectable 50 milliGRAM(s) IV Push every 8 hours  pantoprazole    Tablet 40 milliGRAM(s) Oral before breakfast  piperacillin/tazobactam IVPB.. 3.375 Gram(s) IV Intermittent every 8 hours  simvastatin 40 milliGRAM(s) Oral at bedtime  tamsulosin 0.4 milliGRAM(s) Oral at bedtime    MEDICATIONS  (PRN):  acetaminophen     Tablet .. 650 milliGRAM(s) Oral every 6 hours PRN Temp greater or equal to 38C (100.4F), Mild Pain (1 - 3)  aluminum hydroxide/magnesium hydroxide/simethicone Suspension 30 milliLiter(s) Oral every 4 hours PRN Dyspepsia  melatonin 3 milliGRAM(s) Oral at bedtime PRN Insomnia  ondansetron Injectable 4 milliGRAM(s) IV Push every 8 hours PRN Nausea and/or Vomiting      CAPILLARY BLOOD GLUCOSE        I&O's Summary    15 Mar 2024 07:01  -  16 Mar 2024 07:00  --------------------------------------------------------  IN: 0 mL / OUT: 200 mL / NET: -200 mL    Vital Signs Last 24 Hrs  T(C): 36.3 (17 Mar 2024 05:25), Max: 36.9 (16 Mar 2024 14:17)  T(F): 97.4 (17 Mar 2024 05:25), Max: 98.4 (16 Mar 2024 14:17)  HR: 65 (17 Mar 2024 05:25) (65 - 76)  BP: 87/51 (17 Mar 2024 05:25) (80/41 - 96/50)  BP(mean): --  RR: 16 (17 Mar 2024 05:25) (16 - 19)  SpO2: 97% (17 Mar 2024 05:25) (95% - 98%)    Parameters below as of 17 Mar 2024 05:25  Patient On (Oxygen Delivery Method): room air      PHYSICAL EXAM:  GENERAL: NAD, well-developed, well-nourished  HEAD: Atraumatic, Normocephalic  EYES: Conjunctiva and sclera clear  CHEST/LUNG: Clear to auscultation bilaterally; No wheezes or crackles  HEART: Normal S1/S2; Regular rate and rhythm; No murmurs, rubs, or gallops  ABDOMEN: Soft, Nontender, Nondistended; Bowel sounds present  EXTREMITIES: No clubbing, cyanosis, or edema  PSYCH: A&Ox3      LABS:    CBC Full  -  ( 17 Mar 2024 06:08 )  WBC Count : 17.94 K/uL  RBC Count : 2.85 M/uL  Hemoglobin : 8.9 g/dL  Hematocrit : 25.9 %  Platelet Count - Automated : 143 K/uL  Mean Cell Volume : 90.9 fL  Mean Cell Hemoglobin : 31.2 pg  Mean Cell Hemoglobin Concentration : 34.4 gm/dL  Auto Neutrophil # : 14.99 K/uL  Auto Lymphocyte # : 0.65 K/uL  Auto Monocyte # : 1.03 K/uL  Auto Eosinophil # : 0.00 K/uL  Auto Basophil # : 0.02 K/uL  Auto Neutrophil % : 83.6 %  Auto Lymphocyte % : 3.6 %  Auto Monocyte % : 5.7 %  Auto Eosinophil % : 0.0 %  Auto Basophil % : 0.1 %    03-17    140  |  107  |  28<H>  ----------------------------<  122<H>  3.0<L>   |  23  |  1.16    Ca    8.0<L>      17 Mar 2024 06:08  Phos  3.2     03-17  Mg     2.10     03-17    TPro  5.8<L>  /  Alb  2.8<L>  /  TBili  0.2  /  DBili  x   /  AST  140<H>  /  ALT  111<H>  /  AlkPhos  110  03-17    Urinalysis Basic - ( 15 Mar 2024 17:10 )    Color: Yellow / Appearance: Turbid / S.013 / pH: x  Gluc: x / Ketone: Negative mg/dL  / Bili: Negative / Urobili: 0.2 mg/dL   Blood: x / Protein: 30 mg/dL / Nitrite: Negative   Leuk Esterase: Moderate / RBC: 5-10 /HPF / WBC 20-25 /HPF   Sq Epi: x / Non Sq Epi: x / Bacteria: Few /HPF        RADIOLOGY & ADDITIONAL TESTS:

## 2024-03-17 NOTE — PROGRESS NOTE ADULT - ATTENDING COMMENTS
82 yo M CAD s/p PCI, metastatic prostate cancer admitted with urosepsis/pyelonephritis, course complicated by acute LLE DVT. Received pulse dose steroid   - c/w Zosyn (previously grew pan sensitive pseudomonas), f/u UCx/BCx  - Start full dose Lovenox   - will likely need vascular medicine consult for AC management (likely unprovoked DVT)  - Transaminitis worsening yesterday, possibly due to Zosyn. Simvastatin held, transaminitis improving 84 yo M CAD s/p PCI, metastatic prostate cancer admitted with urosepsis/pyelonephritis, course complicated by acute LLE DVT. Received pulse dose steroid   - c/w Zosyn (previously grew pan sensitive pseudomonas), f/u UCx/BCx  - Start full dose Lovenox   - will likely need vascular medicine consult for AC management (likely unprovoked DVT)  - Transaminitis worsening yesterday, possibly due to Zosyn. Simvastatin held, transaminitis improving  -F/U MRI for kidney lesion noted on US

## 2024-03-18 DIAGNOSIS — C61 MALIGNANT NEOPLASM OF PROSTATE: ICD-10-CM

## 2024-03-18 DIAGNOSIS — I82.409 ACUTE EMBOLISM AND THROMBOSIS OF UNSPECIFIED DEEP VEINS OF UNSPECIFIED LOWER EXTREMITY: ICD-10-CM

## 2024-03-18 DIAGNOSIS — R33.9 RETENTION OF URINE, UNSPECIFIED: ICD-10-CM

## 2024-03-18 LAB
-  AMOXICILLIN/CLAVULANIC ACID: SIGNIFICANT CHANGE UP
-  AMPICILLIN/SULBACTAM: SIGNIFICANT CHANGE UP
-  AMPICILLIN: SIGNIFICANT CHANGE UP
-  AZTREONAM: SIGNIFICANT CHANGE UP
-  CEFAZOLIN: SIGNIFICANT CHANGE UP
-  CEFEPIME: SIGNIFICANT CHANGE UP
-  CEFOXITIN: SIGNIFICANT CHANGE UP
-  CEFTRIAXONE: SIGNIFICANT CHANGE UP
-  CEFUROXIME: SIGNIFICANT CHANGE UP
-  CIPROFLOXACIN: SIGNIFICANT CHANGE UP
-  ERTAPENEM: SIGNIFICANT CHANGE UP
-  GENTAMICIN: SIGNIFICANT CHANGE UP
-  IMIPENEM: SIGNIFICANT CHANGE UP
-  LEVOFLOXACIN: SIGNIFICANT CHANGE UP
-  MEROPENEM: SIGNIFICANT CHANGE UP
-  NITROFURANTOIN: SIGNIFICANT CHANGE UP
-  PIPERACILLIN/TAZOBACTAM: SIGNIFICANT CHANGE UP
-  TOBRAMYCIN: SIGNIFICANT CHANGE UP
-  TRIMETHOPRIM/SULFAMETHOXAZOLE: SIGNIFICANT CHANGE UP
ALBUMIN SERPL ELPH-MCNC: 2.9 G/DL — LOW (ref 3.3–5)
ALP SERPL-CCNC: 97 U/L — SIGNIFICANT CHANGE UP (ref 40–120)
ALT FLD-CCNC: 79 U/L — HIGH (ref 4–41)
ANION GAP SERPL CALC-SCNC: 11 MMOL/L — SIGNIFICANT CHANGE UP (ref 7–14)
AST SERPL-CCNC: 75 U/L — HIGH (ref 4–40)
BASOPHILS # BLD AUTO: 0.01 K/UL — SIGNIFICANT CHANGE UP (ref 0–0.2)
BASOPHILS NFR BLD AUTO: 0.1 % — SIGNIFICANT CHANGE UP (ref 0–2)
BILIRUB SERPL-MCNC: <0.2 MG/DL — SIGNIFICANT CHANGE UP (ref 0.2–1.2)
BUN SERPL-MCNC: 28 MG/DL — HIGH (ref 7–23)
CALCIUM SERPL-MCNC: 8.1 MG/DL — LOW (ref 8.4–10.5)
CHLORIDE SERPL-SCNC: 109 MMOL/L — HIGH (ref 98–107)
CO2 SERPL-SCNC: 22 MMOL/L — SIGNIFICANT CHANGE UP (ref 22–31)
CREAT SERPL-MCNC: 1.03 MG/DL — SIGNIFICANT CHANGE UP (ref 0.5–1.3)
CULTURE RESULTS: ABNORMAL
EGFR: 71 ML/MIN/1.73M2 — SIGNIFICANT CHANGE UP
EOSINOPHIL # BLD AUTO: 0 K/UL — SIGNIFICANT CHANGE UP (ref 0–0.5)
EOSINOPHIL NFR BLD AUTO: 0 % — SIGNIFICANT CHANGE UP (ref 0–6)
GLUCOSE SERPL-MCNC: 165 MG/DL — HIGH (ref 70–99)
HCT VFR BLD CALC: 25.1 % — LOW (ref 39–50)
HGB BLD-MCNC: 8.6 G/DL — LOW (ref 13–17)
IANC: 15.4 K/UL — HIGH (ref 1.8–7.4)
IMM GRANULOCYTES NFR BLD AUTO: 1.2 % — HIGH (ref 0–0.9)
LYMPHOCYTES # BLD AUTO: 0.79 K/UL — LOW (ref 1–3.3)
LYMPHOCYTES # BLD AUTO: 4.7 % — LOW (ref 13–44)
MAGNESIUM SERPL-MCNC: 2.2 MG/DL — SIGNIFICANT CHANGE UP (ref 1.6–2.6)
MCHC RBC-ENTMCNC: 31.5 PG — SIGNIFICANT CHANGE UP (ref 27–34)
MCHC RBC-ENTMCNC: 34.3 GM/DL — SIGNIFICANT CHANGE UP (ref 32–36)
MCV RBC AUTO: 91.9 FL — SIGNIFICANT CHANGE UP (ref 80–100)
METHOD TYPE: SIGNIFICANT CHANGE UP
MONOCYTES # BLD AUTO: 0.48 K/UL — SIGNIFICANT CHANGE UP (ref 0–0.9)
MONOCYTES NFR BLD AUTO: 2.8 % — SIGNIFICANT CHANGE UP (ref 2–14)
NEUTROPHILS # BLD AUTO: 15.4 K/UL — HIGH (ref 1.8–7.4)
NEUTROPHILS NFR BLD AUTO: 91.2 % — HIGH (ref 43–77)
NRBC # BLD: 0 /100 WBCS — SIGNIFICANT CHANGE UP (ref 0–0)
NRBC # FLD: 0 K/UL — SIGNIFICANT CHANGE UP (ref 0–0)
ORGANISM # SPEC MICROSCOPIC CNT: ABNORMAL
ORGANISM # SPEC MICROSCOPIC CNT: ABNORMAL
PHOSPHATE SERPL-MCNC: 2.3 MG/DL — LOW (ref 2.5–4.5)
PLATELET # BLD AUTO: 179 K/UL — SIGNIFICANT CHANGE UP (ref 150–400)
POTASSIUM SERPL-MCNC: 3.3 MMOL/L — LOW (ref 3.5–5.3)
POTASSIUM SERPL-SCNC: 3.3 MMOL/L — LOW (ref 3.5–5.3)
PROT SERPL-MCNC: 5.9 G/DL — LOW (ref 6–8.3)
RBC # BLD: 2.73 M/UL — LOW (ref 4.2–5.8)
RBC # FLD: 14.3 % — SIGNIFICANT CHANGE UP (ref 10.3–14.5)
SODIUM SERPL-SCNC: 142 MMOL/L — SIGNIFICANT CHANGE UP (ref 135–145)
SPECIMEN SOURCE: SIGNIFICANT CHANGE UP
WBC # BLD: 16.89 K/UL — HIGH (ref 3.8–10.5)
WBC # FLD AUTO: 16.89 K/UL — HIGH (ref 3.8–10.5)

## 2024-03-18 PROCEDURE — 74182 MRI ABDOMEN W/CONTRAST: CPT | Mod: 26

## 2024-03-18 PROCEDURE — 99232 SBSQ HOSP IP/OBS MODERATE 35: CPT | Mod: GC

## 2024-03-18 RX ORDER — CEFTRIAXONE 500 MG/1
1000 INJECTION, POWDER, FOR SOLUTION INTRAMUSCULAR; INTRAVENOUS EVERY 24 HOURS
Refills: 0 | Status: DISCONTINUED | OUTPATIENT
Start: 2024-03-19 | End: 2024-03-19

## 2024-03-18 RX ORDER — POTASSIUM CHLORIDE 20 MEQ
10 PACKET (EA) ORAL
Refills: 0 | Status: COMPLETED | OUTPATIENT
Start: 2024-03-18 | End: 2024-03-18

## 2024-03-18 RX ORDER — APIXABAN 2.5 MG/1
1 TABLET, FILM COATED ORAL
Qty: 60 | Refills: 0
Start: 2024-03-18 | End: 2024-04-16

## 2024-03-18 RX ORDER — HYDROCORTISONE 20 MG
50 TABLET ORAL EVERY 12 HOURS
Refills: 0 | Status: DISCONTINUED | OUTPATIENT
Start: 2024-03-18 | End: 2024-03-19

## 2024-03-18 RX ORDER — APIXABAN 2.5 MG/1
10 TABLET, FILM COATED ORAL EVERY 12 HOURS
Refills: 0 | Status: COMPLETED | OUTPATIENT
Start: 2024-03-18 | End: 2024-03-25

## 2024-03-18 RX ORDER — POTASSIUM PHOSPHATE, MONOBASIC POTASSIUM PHOSPHATE, DIBASIC 236; 224 MG/ML; MG/ML
15 INJECTION, SOLUTION INTRAVENOUS ONCE
Refills: 0 | Status: COMPLETED | OUTPATIENT
Start: 2024-03-18 | End: 2024-03-18

## 2024-03-18 RX ORDER — POTASSIUM CHLORIDE 20 MEQ
40 PACKET (EA) ORAL ONCE
Refills: 0 | Status: COMPLETED | OUTPATIENT
Start: 2024-03-18 | End: 2024-03-18

## 2024-03-18 RX ADMIN — Medication 100 MILLIEQUIVALENT(S): at 08:27

## 2024-03-18 RX ADMIN — POTASSIUM PHOSPHATE, MONOBASIC POTASSIUM PHOSPHATE, DIBASIC 62.5 MILLIMOLE(S): 236; 224 INJECTION, SOLUTION INTRAVENOUS at 14:52

## 2024-03-18 RX ADMIN — Medication 5 MILLIGRAM(S): at 14:52

## 2024-03-18 RX ADMIN — FINASTERIDE 5 MILLIGRAM(S): 5 TABLET, FILM COATED ORAL at 10:58

## 2024-03-18 RX ADMIN — ENOXAPARIN SODIUM 60 MILLIGRAM(S): 100 INJECTION SUBCUTANEOUS at 05:25

## 2024-03-18 RX ADMIN — Medication 81 MILLIGRAM(S): at 10:58

## 2024-03-18 RX ADMIN — MEROPENEM 100 MILLIGRAM(S): 1 INJECTION INTRAVENOUS at 05:25

## 2024-03-18 RX ADMIN — APIXABAN 10 MILLIGRAM(S): 2.5 TABLET, FILM COATED ORAL at 18:09

## 2024-03-18 RX ADMIN — Medication 5 MILLIGRAM(S): at 05:25

## 2024-03-18 RX ADMIN — Medication 100 MILLIEQUIVALENT(S): at 09:26

## 2024-03-18 RX ADMIN — PANTOPRAZOLE SODIUM 40 MILLIGRAM(S): 20 TABLET, DELAYED RELEASE ORAL at 05:25

## 2024-03-18 RX ADMIN — Medication 100 MILLIEQUIVALENT(S): at 10:57

## 2024-03-18 RX ADMIN — Medication 40 MILLIEQUIVALENT(S): at 08:27

## 2024-03-18 RX ADMIN — SIMVASTATIN 40 MILLIGRAM(S): 20 TABLET, FILM COATED ORAL at 22:22

## 2024-03-18 RX ADMIN — Medication 50 MILLIGRAM(S): at 05:25

## 2024-03-18 RX ADMIN — Medication 50 MILLIGRAM(S): at 18:09

## 2024-03-18 RX ADMIN — TAMSULOSIN HYDROCHLORIDE 0.4 MILLIGRAM(S): 0.4 CAPSULE ORAL at 22:22

## 2024-03-18 RX ADMIN — Medication 5 MILLIGRAM(S): at 22:22

## 2024-03-18 NOTE — PROGRESS NOTE ADULT - PROBLEM SELECTOR PLAN 2
outpatient mri prostate, biopsy + path reports, urology documentation reviewed known metastasis to bones, and rectal area.   currently on Hormone based chemotherapy including zytiga, casodex, eligard inj + prednisone for treatment;     -last Eligard injection 03/15/2024  -hold  zytiga, casodex, and prednisone iso acute infection  -cont home tamsulosin + finasteride  -Pain control  -outpatient urology follow up  for further mgmt Acute above knee LLE DVT. Provoked iso known metastatic prostate cancer  - Duplex showing nonocclusive thrombus within the left common femoral vein, femoral vein, and deep femoral vein. The popliteal vein is patent.  - C/w lovenox 60 BID for full AC  - Will price check eliquis

## 2024-03-18 NOTE — PROGRESS NOTE ADULT - PROBLEM SELECTOR PLAN 1
-pt has chronic browne since dx of prostate cancer, last urine culture grew pseudomonas, s/p abx steroid, and fluid in the ED, likely UTI/pyelonephritis   -TTE 2023 EF 71%     -f/u blood cultures and urine culture  -Zosyn (dced 3/17),  deescalate based on final urine c+s-  -started meropenem (3/17-) as WBC trending up and BPs softer  -started mIVF  -c/w fluid resuscitation 500cc, s/p 2L  -c/w solu-cortef 50mg q8  -US kidney w/ R renal pole lesion, pending MRI  -browne catheter change   -monitor VS -pt has chronic browne since dx of prostate cancer, last urine culture grew pseudomonas, s/p abx steroid, and fluid in the ED, likely UTI/pyelonephritis   -TTE 2023 EF 71%   - UCx showing E coli, pending sensitivities   - Zosyn (dced 3/17),  deescalate based on final urine c+s-  - started meropenem (3/17-) as WBC trending up and BPs softer  - started mIVF  - solu-cortef 50mg taper: Q8 3/17, BID today 3/18, QD tomorrow 3/19  -US kidney w/ R renal pole lesion, pending MRI  -browne catheter change   -monitor VS

## 2024-03-18 NOTE — PROGRESS NOTE ADULT - SUBJECTIVE AND OBJECTIVE BOX
***************************************************************  Tony Pena, PGY2  Internal Medicine   TEAMS Preferred  ***************************************************************    WHIT MATTHEWS  83y  MRN: [id number]  03-15-24 (3d)    Patient is a 83y old  Male who presents with a chief complaint of chills (17 Mar 2024 10:07)      SUBJECTIVE / OVERNIGHT EVENTS:   No acute overnight events. Pt seen and examined at bedside. Denies fevers, chills, CP, SOB, Abdominal pain, N/V, Constipation, Diarrhea. Last BM     12 Point ROS negative with the exception of the above    MEDICATIONS  (STANDING):  aspirin enteric coated 81 milliGRAM(s) Oral daily  enoxaparin Injectable 60 milliGRAM(s) SubCutaneous every 12 hours  finasteride 5 milliGRAM(s) Oral daily  hydrocortisone sodium succinate Injectable 50 milliGRAM(s) IV Push every 8 hours  lactated ringers. 1000 milliLiter(s) (75 mL/Hr) IV Continuous <Continuous>  meropenem  IVPB 1000 milliGRAM(s) IV Intermittent every 12 hours  meropenem  IVPB      oxybutynin 5 milliGRAM(s) Oral three times a day  pantoprazole    Tablet 40 milliGRAM(s) Oral before breakfast  simvastatin 40 milliGRAM(s) Oral at bedtime  tamsulosin 0.4 milliGRAM(s) Oral at bedtime    MEDICATIONS  (PRN):  acetaminophen     Tablet .. 650 milliGRAM(s) Oral every 6 hours PRN Temp greater or equal to 38C (100.4F), Mild Pain (1 - 3)  aluminum hydroxide/magnesium hydroxide/simethicone Suspension 30 milliLiter(s) Oral every 4 hours PRN Dyspepsia  melatonin 3 milliGRAM(s) Oral at bedtime PRN Insomnia  ondansetron Injectable 4 milliGRAM(s) IV Push every 8 hours PRN Nausea and/or Vomiting      OBJECTIVE:  Vital Signs Last 24 Hrs  T(C): 36.3 (18 Mar 2024 05:20), Max: 36.5 (17 Mar 2024 22:38)  T(F): 97.4 (18 Mar 2024 05:20), Max: 97.7 (17 Mar 2024 22:38)  HR: 60 (18 Mar 2024 05:20) (60 - 62)  BP: 119/58 (18 Mar 2024 05:20) (93/51 - 129/59)  BP(mean): --  RR: 18 (18 Mar 2024 05:20) (17 - 18)  SpO2: 97% (18 Mar 2024 05:20) (97% - 97%)    Parameters below as of 18 Mar 2024 05:20  Patient On (Oxygen Delivery Method): room air        I&O's Summary    17 Mar 2024 07:01  -  18 Mar 2024 07:00  --------------------------------------------------------  IN: 440 mL / OUT: 1600 mL / NET: -1160 mL        PHYSICAL EXAM:  GENERAL: Laying comfortably, NAD  HEENT: NCAT, PERRLA, EOMI, no scleral icterus, no LAD  NECK: No JVD, supple  LUNG: CTABL; No wheezes, crackles, or rhonchi  HEART: RRR; normal S1/S2; No murmurs, rubs, or gallops  ABDOMEN: +BS, soft, nontender, nondistended, no HSM; No rebound, guarding, or rigidity  EXTREMITIES:  No LE edema b/l, 2+ Peripheral Pulses, No clubbing or cyanosis  NEUROLOGY: AOx3, non-focal, strength 5/5 in all extremities, sensation intact  PSYCH: calm and cooperative  SKIN: No rashes or lesions    LABS:                        8.9    17.94 )-----------( 143      ( 17 Mar 2024 06:08 )             25.9     Auto Eosinophil # 0.00  / Auto Eosinophil % 0.0   / Auto Neutrophil # 14.99 / Auto Neutrophil % 83.6  / BANDS % x                            8.8    18.69 )-----------( 131      ( 16 Mar 2024 20:29 )             25.9     Auto Eosinophil # x     / Auto Eosinophil % x     / Auto Neutrophil # x     / Auto Neutrophil % x     / BANDS % x                            9.8    16.21 )-----------( 142      ( 16 Mar 2024 09:50 )             30.2     Auto Eosinophil # 0.00  / Auto Eosinophil % 0.0   / Auto Neutrophil # 14.95 / Auto Neutrophil % 88.7  / BANDS % 3.5      03-17    140  |  107  |  28<H>  ----------------------------<  122<H>  3.0<L>   |  23  |  1.16  03-16    139  |  104  |  28<H>  ----------------------------<  129<H>  3.6   |  21<L>  |  1.36<H>  03-15    139  |  101  |  29<H>  ----------------------------<  108<H>  3.9   |  23  |  1.28    Ca    8.0<L>      17 Mar 2024 06:08  Ca    8.2<L>      16 Mar 2024 09:50  Ca    9.3      15 Mar 2024 16:55  Phos  3.2     03-17  Mg     2.10     03-17    TPro  5.8<L>  /  Alb  2.8<L>  /  TBili  0.2  /  DBili  x   /  AST  140<H>  /  ALT  111<H>  /  AlkPhos  110  03-17  TPro  6.4  /  Alb  3.3  /  TBili  0.3  /  DBili  x   /  AST  300<H>  /  ALT  201<H>  /  AlkPhos  162<H>  03-16  TPro  7.5  /  Alb  3.8  /  TBili  0.4  /  DBili  x   /  AST  35  /  ALT  19  /  AlkPhos  117  03-15    PT/INR - ( 17 Mar 2024 06:08 )   PT: 13.7 sec;   INR: 1.22 ratio         PTT - ( 17 Mar 2024 06:08 )  PTT:34.7 sec      Urinalysis Basic - ( 17 Mar 2024 06:08 )    Color: x / Appearance: x / SG: x / pH: x  Gluc: 122 mg/dL / Ketone: x  / Bili: x / Urobili: x   Blood: x / Protein: x / Nitrite: x   Leuk Esterase: x / RBC: x / WBC x   Sq Epi: x / Non Sq Epi: x / Bacteria: x        Lactate, Blood: 3.2 mmol/L (03-16 @ 09:50)  Lactate, Blood: 2.7 mmol/L (03-15 @ 19:35)    CAPILLARY BLOOD GLUCOSE            RADIOLOGY & ADDITIONAL TESTS:  CT Abdomen and Pelvis w/ IV Cont:   ACC: [id number] EXAM:  CT ABDOMEN AND PELVIS IC   ORDERED BY: JARROD DURAN     PROCEDURE DATE:  03/15/2024          INTERPRETATION:  CLINICAL INFORMATION: Abdominal pain, rash, prostate   cancer    COMPARISON: CT abdomen pelvis 12/26/2023, MR pelvis 9/28/2023    CONTRAST/COMPLICATIONS:  IV Contrast: Omnipaque 350  90 cc administered   10 cc discarded  Oral Contrast: NONE  Complications: None reported at time of study completion    PROCEDURE:  CT of the Abdomen and Pelvis was performed.  Sagittal and coronal reformats were performed.    FINDINGS:  LOWER CHEST: Coronary artery calcifications.    LIVER: Similar 5 cm left hepatic lobe lesion with findings suggestive of   hemangioma. Similar 1.5 cm segment 6 hypoattenuating lesion.  BILE DUCTS: Normal caliber.  GALLBLADDER: Fundal adenomyomatosis.  SPLEEN: Within normal limits.  PANCREAS: Within normal limits.  ADRENALS: Within normal limits.  KIDNEYS/URETERS: No hydronephrosis. New right lower pole ill-defined area   of hypoenhancement measuring 1.8 x 1.2 cm (301-50). Similar right upper   pole cystic lesion with thick peripheral calcifications. Additional   simple 2.2 cm left renal cyst.    BLADDER: Decompressed with Snow catheter in place.  REPRODUCTIVE ORGANS: The prostate gland is markedly enlarged measuring   approximately 5.1 x 6.4 x 6.7 cm with hypertrophy of the median lobe   protruding into the bladder base.    BOWEL: No bowel obstruction. Appendix is normal. Prostate extension into   the anterior rectal wall is better seen on prior MR.  PERITONEUM: No ascites.  VESSELS: Mild to moderate atherosclerosis of the abdominal aorta, which   is somewhat tortuous. No evidence of aortic aneurysm. 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 There is asymmetric   hypoenhancement of the left common and superficial femoral vein relative   to the right.  RETROPERITONEUM/LYMPH NODES: No lymphadenopathy.  ABDOMINAL WALL: Unremarkable.  BONES: Diffuse osseous metastasis, similar to prior. The bones are   osteopenic. There is chronic mild height loss of the L1 and L3 vertebral   bodies again noted. There is reversal of the normal lumbar lordosis.    IMPRESSION:    There is an apparent indeterminate right lower pole renal lesion   measuring 1.8 x 1.2 cm, which could represent focal pyelonephritis or a   mass Recommend further evaluation with contrast-enhanced MRI.    Asymmetric enhancement of the left common and superficial femoral vein   relative to the right is nonspecific and may represent differential   venous drainage, the underlying filling defect cannot be excluded. Left   lower extremity DVT ultrasound is suggested to exclude underlying DVT.    There is extensive blastic osseous metastatic disease at the axial and   appendicular skeleton again noted, likely related to metastatic prostate   cancer. The prostate gland is also markedly enlarged and there is loss of   fat plane between the posterior prostate and anterior rectumagain noted.    Additional chronic and incidental findings are present as detailed above.        --- End of Report ---          JULITA PARRY MD; Resident Radiologist  This document has been electronically signed.  JAMES MATA MD; Attending Radiologist  This document has been electronically signed. Mar 15 2024  6:47PM (03-15-24 @ 17:34)     ***************************************************************  Tony Pena, PGY2  Internal Medicine   TEAMS Preferred  ***************************************************************    WHIT MATTHEWS  83y  MRN: [id number]  03-15-24 (3d)    Patient is a 83y old  Male who presents with a chief complaint of chills (17 Mar 2024 10:07)      SUBJECTIVE / OVERNIGHT EVENTS:   No acute overnight events. Pt seen and examined at bedside. Denies fevers, chills, CP, SOB, Abdominal pain, N/V. States he feels improved compared to yesterday.     12 Point ROS negative with the exception of the above    MEDICATIONS  (STANDING):  aspirin enteric coated 81 milliGRAM(s) Oral daily  enoxaparin Injectable 60 milliGRAM(s) SubCutaneous every 12 hours  finasteride 5 milliGRAM(s) Oral daily  hydrocortisone sodium succinate Injectable 50 milliGRAM(s) IV Push every 8 hours  lactated ringers. 1000 milliLiter(s) (75 mL/Hr) IV Continuous <Continuous>  meropenem  IVPB 1000 milliGRAM(s) IV Intermittent every 12 hours  meropenem  IVPB      oxybutynin 5 milliGRAM(s) Oral three times a day  pantoprazole    Tablet 40 milliGRAM(s) Oral before breakfast  simvastatin 40 milliGRAM(s) Oral at bedtime  tamsulosin 0.4 milliGRAM(s) Oral at bedtime    MEDICATIONS  (PRN):  acetaminophen     Tablet .. 650 milliGRAM(s) Oral every 6 hours PRN Temp greater or equal to 38C (100.4F), Mild Pain (1 - 3)  aluminum hydroxide/magnesium hydroxide/simethicone Suspension 30 milliLiter(s) Oral every 4 hours PRN Dyspepsia  melatonin 3 milliGRAM(s) Oral at bedtime PRN Insomnia  ondansetron Injectable 4 milliGRAM(s) IV Push every 8 hours PRN Nausea and/or Vomiting      OBJECTIVE:  Vital Signs Last 24 Hrs  T(C): 36.3 (18 Mar 2024 05:20), Max: 36.5 (17 Mar 2024 22:38)  T(F): 97.4 (18 Mar 2024 05:20), Max: 97.7 (17 Mar 2024 22:38)  HR: 60 (18 Mar 2024 05:20) (60 - 62)  BP: 119/58 (18 Mar 2024 05:20) (93/51 - 129/59)  BP(mean): --  RR: 18 (18 Mar 2024 05:20) (17 - 18)  SpO2: 97% (18 Mar 2024 05:20) (97% - 97%)    Parameters below as of 18 Mar 2024 05:20  Patient On (Oxygen Delivery Method): room air        I&O's Summary    17 Mar 2024 07:01  -  18 Mar 2024 07:00  --------------------------------------------------------  IN: 440 mL / OUT: 1600 mL / NET: -1160 mL        PHYSICAL EXAM:  GENERAL: Laying comfortably, NAD, thin appearing   HEENT: NCAT, PERRLA, EOMI, no scleral icterus, no LAD  LUNG: CTABL; No wheezes, crackles, or rhonchi  HEART: RRR; normal S1/S2; No murmurs, rubs, or gallops  ABDOMEN: +BS, soft, nontender, nondistended, no HSM; No rebound, guarding, or rigidity  EXTREMITIES:  No LE edema b/l, 2+ Peripheral Pulses, No clubbing or cyanosis  : browne  NEUROLOGY: AOx3, non-focal, sensation intact  PSYCH: calm and cooperative  SKIN: No rashes or lesions    LABS:                        8.6    16.89 )-----------( 179      ( 18 Mar 2024 06:00 )             25.1   03-18    142  |  109<H>  |  28<H>  ----------------------------<  165<H>  3.3<L>   |  22  |  1.03    Ca    8.1<L>      18 Mar 2024 06:00  Phos  2.3     03-18  Mg     2.20     03-18    TPro  5.9<L>  /  Alb  2.9<L>  /  TBili  <0.2  /  DBili  x   /  AST  75<H>  /  ALT  79<H>  /  AlkPhos  97  03-18                          8.9    17.94 )-----------( 143      ( 17 Mar 2024 06:08 )             25.9     Auto Eosinophil # 0.00  / Auto Eosinophil % 0.0   / Auto Neutrophil # 14.99 / Auto Neutrophil % 83.6  / BANDS % x                            8.8    18.69 )-----------( 131      ( 16 Mar 2024 20:29 )             25.9     Auto Eosinophil # x     / Auto Eosinophil % x     / Auto Neutrophil # x     / Auto Neutrophil % x     / BANDS % x                            9.8    16.21 )-----------( 142      ( 16 Mar 2024 09:50 )             30.2     Auto Eosinophil # 0.00  / Auto Eosinophil % 0.0   / Auto Neutrophil # 14.95 / Auto Neutrophil % 88.7  / BANDS % 3.5      03-17    140  |  107  |  28<H>  ----------------------------<  122<H>  3.0<L>   |  23  |  1.16  03-16    139  |  104  |  28<H>  ----------------------------<  129<H>  3.6   |  21<L>  |  1.36<H>  03-15    139  |  101  |  29<H>  ----------------------------<  108<H>  3.9   |  23  |  1.28    Ca    8.0<L>      17 Mar 2024 06:08  Ca    8.2<L>      16 Mar 2024 09:50  Ca    9.3      15 Mar 2024 16:55  Phos  3.2     03-17  Mg     2.10     03-17    TPro  5.8<L>  /  Alb  2.8<L>  /  TBili  0.2  /  DBili  x   /  AST  140<H>  /  ALT  111<H>  /  AlkPhos  110  03-17  TPro  6.4  /  Alb  3.3  /  TBili  0.3  /  DBili  x   /  AST  300<H>  /  ALT  201<H>  /  AlkPhos  162<H>  03-16  TPro  7.5  /  Alb  3.8  /  TBili  0.4  /  DBili  x   /  AST  35  /  ALT  19  /  AlkPhos  117  03-15    PT/INR - ( 17 Mar 2024 06:08 )   PT: 13.7 sec;   INR: 1.22 ratio         PTT - ( 17 Mar 2024 06:08 )  PTT:34.7 sec      Urinalysis Basic - ( 17 Mar 2024 06:08 )    Color: x / Appearance: x / SG: x / pH: x  Gluc: 122 mg/dL / Ketone: x  / Bili: x / Urobili: x   Blood: x / Protein: x / Nitrite: x   Leuk Esterase: x / RBC: x / WBC x   Sq Epi: x / Non Sq Epi: x / Bacteria: x        Lactate, Blood: 3.2 mmol/L (03-16 @ 09:50)  Lactate, Blood: 2.7 mmol/L (03-15 @ 19:35)    CAPILLARY BLOOD GLUCOSE            RADIOLOGY & ADDITIONAL TESTS:  CT Abdomen and Pelvis w/ IV Cont:   ACC: [id number] EXAM:  CT ABDOMEN AND PELVIS IC   ORDERED BY: JARROD DURAN     PROCEDURE DATE:  03/15/2024          INTERPRETATION:  CLINICAL INFORMATION: Abdominal pain, rash, prostate   cancer    COMPARISON: CT abdomen pelvis 12/26/2023, MR pelvis 9/28/2023    CONTRAST/COMPLICATIONS:  IV Contrast: Omnipaque 350  90 cc administered   10 cc discarded  Oral Contrast: NONE  Complications: None reported at time of study completion    PROCEDURE:  CT of the Abdomen and Pelvis was performed.  Sagittal and coronal reformats were performed.    FINDINGS:  LOWER CHEST: Coronary artery calcifications.    LIVER: Similar 5 cm left hepatic lobe lesion with findings suggestive of   hemangioma. Similar 1.5 cm segment 6 hypoattenuating lesion.  BILE DUCTS: Normal caliber.  GALLBLADDER: Fundal adenomyomatosis.  SPLEEN: Within normal limits.  PANCREAS: Within normal limits.  ADRENALS: Within normal limits.  KIDNEYS/URETERS: No hydronephrosis. New right lower pole ill-defined area   of hypoenhancement measuring 1.8 x 1.2 cm (301-50). Similar right upper   pole cystic lesion with thick peripheral calcifications. Additional   simple 2.2 cm left renal cyst.    BLADDER: Decompressed with Browne catheter in place.  REPRODUCTIVE ORGANS: The prostate gland is markedly enlarged measuring   approximately 5.1 x 6.4 x 6.7 cm with hypertrophy of the median lobe   protruding into the bladder base.    BOWEL: No bowel obstruction. Appendix is normal. Prostate extension into   the anterior rectal wall is better seen on prior MR.  PERITONEUM: No ascites.  VESSELS: Mild to moderate atherosclerosis of the abdominal aorta, which   is somewhat tortuous. No evidence of aortic aneurysm. 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 There is asymmetric   hypoenhancement of the left common and superficial femoral vein relative   to the right.  RETROPERITONEUM/LYMPH NODES: No lymphadenopathy.  ABDOMINAL WALL: Unremarkable.  BONES: Diffuse osseous metastasis, similar to prior. The bones are   osteopenic. There is chronic mild height loss of the L1 and L3 vertebral   bodies again noted. There is reversal of the normal lumbar lordosis.    IMPRESSION:    There is an apparent indeterminate right lower pole renal lesion   measuring 1.8 x 1.2 cm, which could represent focal pyelonephritis or a   mass Recommend further evaluation with contrast-enhanced MRI.    Asymmetric enhancement of the left common and superficial femoral vein   relative to the right is nonspecific and may represent differential   venous drainage, the underlying filling defect cannot be excluded. Left   lower extremity DVT ultrasound is suggested to exclude underlying DVT.    There is extensive blastic osseous metastatic disease at the axial and   appendicular skeleton again noted, likely related to metastatic prostate   cancer. The prostate gland is also markedly enlarged and there is loss of   fat plane between the posterior prostate and anterior rectumagain noted.    Additional chronic and incidental findings are present as detailed above.       US Duplex Venous Lower Ext Complete, Bilateral (03.16.24 @ 13:22)  RIGHT:  Normal compressibility of the RIGHT common femoral, femoral and popliteal   veins.  Doppler examination shows normal spontaneous and phasic flow.  No RIGHT calf vein thrombosis is detected.    LEFT:  Nonocclusive thrombus is seen within the left common femoral vein,   femoral vein, and deep femoral vein.  The popliteal vein is patent.  No LEFT calf vein thrombosis is detected.    IMPRESSION:  Acute deep venous thrombosis: above the knee.    Left deep vein thrombosis involving the common femoral, femoral, and deep   femoral veins, correlating with the finding on recent CT.

## 2024-03-18 NOTE — PROGRESS NOTE ADULT - PROBLEM SELECTOR PLAN 3
-c/w ASA  -c/w home medication simvastatin outpatient mri prostate, biopsy + path reports, urology documentation reviewed known metastasis to bones, and rectal area.   currently on Hormone based chemotherapy including zytiga, casodex, eligard inj + prednisone for treatment;     -last Eligard injection 03/15/2024  -hold  zytiga, casodex, and prednisone iso acute infection  -cont home tamsulosin + finasteride  -Pain control  -outpatient urology follow up  for further mgmt

## 2024-03-18 NOTE — PROGRESS NOTE ADULT - ATTENDING COMMENTS
clinically resolving septic shock  bcx ngtd, bandemia resolved, leukocytosis stagnant but this is steroid driven  on giovana - awaiting e-coli sensitivities - anticipate 7-10 day course, suly exchanged   taper Solu-Cortef to BID today, daily quincy and then transition to home prednisone he is on for prostate Ca  transaminitis - likely d.t zosyn, resolving  MR abd to assess renal lesion - pending  Eliquis covered - LLE DVT provoked in setting of malignancy   dispo - JONAH clinically resolving septic shock  bcx ngtd, bandemia resolved, leukocytosis stagnant but this is steroid driven  on giovana - awaiting e-coli sensitivities - anticipate 7-10 day course, browne exchanged   taper Solu-Cortef to BID today, daily quincy and then transition to home prednisone he is on for prostate Ca  transaminitis - likely d.t zosyn, resolving  MR abd to assess renal lesion - pending  Eliquis covered - LLE DVT provoked in setting of malignancy   dispo - JONAH

## 2024-03-18 NOTE — PROGRESS NOTE ADULT - PROBLEM SELECTOR PLAN 5
- Fluids:   - Electrolytes: Will replete to maintain K>4, Phos>3, and Mag>2  - Nutrition: DASH  - Activity: as tolerated   - DVT Prophylaxis: duplex to r/o DVT, sub q heparin q 12  - Stress Ulcer/GI Prophylaxis: pantoprazole   - Disposition: pending medical management  - Ethics: full code -c/w pantoprazole

## 2024-03-18 NOTE — PROGRESS NOTE ADULT - PROBLEM SELECTOR PLAN 6
- Fluids:   - Electrolytes: Will replete to maintain K>4, Phos>3, and Mag>2  - Nutrition: DASH  - Activity: as tolerated   - DVT Prophylaxis: lovenox 60 BID  - Stress Ulcer/GI Prophylaxis: pantoprazole   - Disposition: pending medical management  - Ethics: full code

## 2024-03-19 LAB
ALBUMIN SERPL ELPH-MCNC: 2.7 G/DL — LOW (ref 3.3–5)
ALP SERPL-CCNC: 88 U/L — SIGNIFICANT CHANGE UP (ref 40–120)
ALT FLD-CCNC: 73 U/L — HIGH (ref 4–41)
ANION GAP SERPL CALC-SCNC: 8 MMOL/L — SIGNIFICANT CHANGE UP (ref 7–14)
AST SERPL-CCNC: 61 U/L — HIGH (ref 4–40)
BASOPHILS # BLD AUTO: 0.01 K/UL — SIGNIFICANT CHANGE UP (ref 0–0.2)
BASOPHILS NFR BLD AUTO: 0.1 % — SIGNIFICANT CHANGE UP (ref 0–2)
BILIRUB SERPL-MCNC: <0.2 MG/DL — SIGNIFICANT CHANGE UP (ref 0.2–1.2)
BUN SERPL-MCNC: 26 MG/DL — HIGH (ref 7–23)
CALCIUM SERPL-MCNC: 8.1 MG/DL — LOW (ref 8.4–10.5)
CHLORIDE SERPL-SCNC: 111 MMOL/L — HIGH (ref 98–107)
CO2 SERPL-SCNC: 24 MMOL/L — SIGNIFICANT CHANGE UP (ref 22–31)
CREAT SERPL-MCNC: 1 MG/DL — SIGNIFICANT CHANGE UP (ref 0.5–1.3)
EGFR: 74 ML/MIN/1.73M2 — SIGNIFICANT CHANGE UP
EOSINOPHIL # BLD AUTO: 0 K/UL — SIGNIFICANT CHANGE UP (ref 0–0.5)
EOSINOPHIL NFR BLD AUTO: 0 % — SIGNIFICANT CHANGE UP (ref 0–6)
GLUCOSE SERPL-MCNC: 129 MG/DL — HIGH (ref 70–99)
HCT VFR BLD CALC: 26.2 % — LOW (ref 39–50)
HGB BLD-MCNC: 8.8 G/DL — LOW (ref 13–17)
IANC: 11.75 K/UL — HIGH (ref 1.8–7.4)
IMM GRANULOCYTES NFR BLD AUTO: 0.6 % — SIGNIFICANT CHANGE UP (ref 0–0.9)
LYMPHOCYTES # BLD AUTO: 1.45 K/UL — SIGNIFICANT CHANGE UP (ref 1–3.3)
LYMPHOCYTES # BLD AUTO: 10.4 % — LOW (ref 13–44)
MAGNESIUM SERPL-MCNC: 2 MG/DL — SIGNIFICANT CHANGE UP (ref 1.6–2.6)
MCHC RBC-ENTMCNC: 30.9 PG — SIGNIFICANT CHANGE UP (ref 27–34)
MCHC RBC-ENTMCNC: 33.6 GM/DL — SIGNIFICANT CHANGE UP (ref 32–36)
MCV RBC AUTO: 91.9 FL — SIGNIFICANT CHANGE UP (ref 80–100)
MONOCYTES # BLD AUTO: 0.67 K/UL — SIGNIFICANT CHANGE UP (ref 0–0.9)
MONOCYTES NFR BLD AUTO: 4.8 % — SIGNIFICANT CHANGE UP (ref 2–14)
NEUTROPHILS # BLD AUTO: 11.75 K/UL — HIGH (ref 1.8–7.4)
NEUTROPHILS NFR BLD AUTO: 84.1 % — HIGH (ref 43–77)
NRBC # BLD: 0 /100 WBCS — SIGNIFICANT CHANGE UP (ref 0–0)
NRBC # FLD: 0 K/UL — SIGNIFICANT CHANGE UP (ref 0–0)
PHOSPHATE SERPL-MCNC: 3 MG/DL — SIGNIFICANT CHANGE UP (ref 2.5–4.5)
PLATELET # BLD AUTO: 203 K/UL — SIGNIFICANT CHANGE UP (ref 150–400)
POTASSIUM SERPL-MCNC: 3.2 MMOL/L — LOW (ref 3.5–5.3)
POTASSIUM SERPL-SCNC: 3.2 MMOL/L — LOW (ref 3.5–5.3)
PROT SERPL-MCNC: 5.5 G/DL — LOW (ref 6–8.3)
RBC # BLD: 2.85 M/UL — LOW (ref 4.2–5.8)
RBC # FLD: 14 % — SIGNIFICANT CHANGE UP (ref 10.3–14.5)
SODIUM SERPL-SCNC: 143 MMOL/L — SIGNIFICANT CHANGE UP (ref 135–145)
WBC # BLD: 13.96 K/UL — HIGH (ref 3.8–10.5)
WBC # FLD AUTO: 13.96 K/UL — HIGH (ref 3.8–10.5)

## 2024-03-19 PROCEDURE — 99222 1ST HOSP IP/OBS MODERATE 55: CPT | Mod: GC

## 2024-03-19 PROCEDURE — 99232 SBSQ HOSP IP/OBS MODERATE 35: CPT | Mod: GC

## 2024-03-19 RX ORDER — CEFTRIAXONE 500 MG/1
1000 INJECTION, POWDER, FOR SOLUTION INTRAMUSCULAR; INTRAVENOUS EVERY 24 HOURS
Refills: 0 | Status: COMPLETED | OUTPATIENT
Start: 2024-03-20 | End: 2024-03-24

## 2024-03-19 RX ORDER — POTASSIUM CHLORIDE 20 MEQ
40 PACKET (EA) ORAL EVERY 4 HOURS
Refills: 0 | Status: COMPLETED | OUTPATIENT
Start: 2024-03-19 | End: 2024-03-19

## 2024-03-19 RX ADMIN — Medication 5 MILLIGRAM(S): at 21:20

## 2024-03-19 RX ADMIN — CEFTRIAXONE 100 MILLIGRAM(S): 500 INJECTION, POWDER, FOR SOLUTION INTRAMUSCULAR; INTRAVENOUS at 06:26

## 2024-03-19 RX ADMIN — Medication 40 MILLIEQUIVALENT(S): at 14:56

## 2024-03-19 RX ADMIN — Medication 50 MILLIGRAM(S): at 06:27

## 2024-03-19 RX ADMIN — Medication 5 MILLIGRAM(S): at 06:27

## 2024-03-19 RX ADMIN — FINASTERIDE 5 MILLIGRAM(S): 5 TABLET, FILM COATED ORAL at 14:56

## 2024-03-19 RX ADMIN — SIMVASTATIN 40 MILLIGRAM(S): 20 TABLET, FILM COATED ORAL at 21:20

## 2024-03-19 RX ADMIN — Medication 5 MILLIGRAM(S): at 14:56

## 2024-03-19 RX ADMIN — Medication 81 MILLIGRAM(S): at 14:56

## 2024-03-19 RX ADMIN — APIXABAN 10 MILLIGRAM(S): 2.5 TABLET, FILM COATED ORAL at 18:04

## 2024-03-19 RX ADMIN — PANTOPRAZOLE SODIUM 40 MILLIGRAM(S): 20 TABLET, DELAYED RELEASE ORAL at 06:27

## 2024-03-19 RX ADMIN — APIXABAN 10 MILLIGRAM(S): 2.5 TABLET, FILM COATED ORAL at 06:27

## 2024-03-19 RX ADMIN — TAMSULOSIN HYDROCHLORIDE 0.4 MILLIGRAM(S): 0.4 CAPSULE ORAL at 21:20

## 2024-03-19 RX ADMIN — Medication 40 MILLIEQUIVALENT(S): at 10:12

## 2024-03-19 NOTE — PROGRESS NOTE ADULT - PROBLEM SELECTOR PLAN 2
Acute above knee LLE DVT. Provoked iso known metastatic prostate cancer  - Duplex showing nonocclusive thrombus within the left common femoral vein, femoral vein, and deep femoral vein. The popliteal vein is patent.  - C/w lovenox 60 BID for full AC  - Will price check eliquis

## 2024-03-19 NOTE — CONSULT NOTE ADULT - SUBJECTIVE AND OBJECTIVE BOX
Vascular Surgery Consult    Consulting attending: Javier    HPI:  Patient is an 82 y/o M with PMHx of HTN, HLD, CAD s/p PCI w/ stent, SAH, GERD, metastatic prostate cancer to bones/lung and local invasion to rectum,  urinary retention with chronic browne, recent admission to [facility] in December 2023 for proctitis and UTI who presents from urologist Dr. Pedro' office with fever and chills. Patient went to his urologist today for TOV and 2nd injection for prostate cancer tx. he failed TOV and browne was put back in. he feel cold, started to shake wildly in the urology office. was concerning for injection reaction, mild rash was noted around injection site and leg, afebrile but feels hot,  benadryl was given, no epi was given.  the daughter requested ambulance for him to be transferred to ED. He has not started any new medications recently. Daughter states he was in his usual state of health and feeling well otherwise previously. He endorsed some back pain.  Denies CP, cough, SOB, nausea, vomiting, diarrhea, changes in appetite.     In the ED, patient febrile to 104, tachycardic to 140s, hypotensive to 80s/50s.  Labs remarkable for WBC 1.61, pH 7.43, lactate 3. UA with moderate LE, few bacteria, and moderate blood. CT A/P with right lower pole renal lesion measuring 1.8x1.2 cm, which could represent focal pyelonephritis or a mass, asymmetric enhancement of the left common and superficial femoral vein, LLE DVT US suggested to exclude underlying DVT. Urology consulted for leaking browne catheter. RVP negative. CXR clear lungs.Patient was given 2 L IVF, vancomycin, Solu-cortef *1, and Zosyn.   (15 Mar 2024 23:35)    LLE duplex showed a DVT of the femoral vein and patient was started on anticoagulation. After patient was started on anticoagulation, he received an MRI to evaluate his cancer. The MRI showed a small 3cm right illiacus hematoma. Vascular Surgery consulted. Patient was seen and examined bedside. Hemodynamically stable and nontoxic appearing. Patient denies swelling of lower extremity, denies rest pain or claudication symptoms of LE, denies SOB or LENNON. On physical exam, patient has no signs of phlegmasia and has palpable b/l DPs.        PAST MEDICAL HISTORY:  CAD (Coronary Artery Disease)    Coronary Stent    Hypercholesterolemia    BPH (Benign Prostatic Hypertrophy)    Depression      PAST SURGICAL HISTORY:  S/P Manipulation of Deviated Nasal Septum    History of Spinal Surgery      ALLERGIES:  No Known Allergies        VITALS & I/Os:  Vital Signs Last 24 Hrs  T(C): 36.3 (19 Mar 2024 13:05), Max: 36.8 (18 Mar 2024 22:17)  T(F): 97.3 (19 Mar 2024 13:05), Max: 98.3 (19 Mar 2024 06:25)  HR: 60 (19 Mar 2024 13:05) (60 - 67)  BP: 125/67 (19 Mar 2024 13:05) (124/55 - 135/70)  BP(mean): --  RR: 17 (19 Mar 2024 13:05) (17 - 18)  SpO2: 97% (19 Mar 2024 13:05) (97% - 100%)    Parameters below as of 19 Mar 2024 13:05  Patient On (Oxygen Delivery Method): room air        I&O's Summary    18 Mar 2024 07:01  -  19 Mar 2024 07:00  --------------------------------------------------------  IN: 240 mL / OUT: 1550 mL / NET: -1310 mL    19 Mar 2024 07:01  -  19 Mar 2024 20:15  --------------------------------------------------------  IN: 0 mL / OUT: 1700 mL / NET: -1700 mL      PHYSICAL EXAM:  General: No acute distress, AOx4  Respiratory: Nonlabored  Cardiovascular: RRR  Abdominal: Soft, nondistended, nontender  Extremities: Warm, no signs of hematoma or skin changes  Vascular:  - RLE: palpable DP, Full ROM, no disocoloration/edema/tenderness  - LLE: palpable DP, Full ROM, no disocoloration/edema/tenderness      LABS:                        8.8    13.96 )-----------( 203      ( 19 Mar 2024 06:35 )             26.2     03-19    143  |  111<H>  |  26<H>  ----------------------------<  129<H>  3.2<L>   |  24  |  1.00    Ca    8.1<L>      19 Mar 2024 06:35  Phos  3.0     03-19  Mg     2.00     03-19    TPro  5.5<L>  /  Alb  2.7<L>  /  TBili  <0.2  /  DBili  x   /  AST  61<H>  /  ALT  73<H>  /  AlkPhos  88  03-19    Urinalysis Basic - ( 19 Mar 2024 06:35 )    Color: x / Appearance: x / SG: x / pH: x  Gluc: 129 mg/dL / Ketone: x  / Bili: x / Urobili: x   Blood: x / Protein: x / Nitrite: x   Leuk Esterase: x / RBC: x / WBC x   Sq Epi: x / Non Sq Epi: x / Bacteria: x      IMAGING:  < from: MR Abdomen w/ IV Cont (03.18.24 @ 18:28) >  FINDINGS:  LOWER CHEST: Small bilateral pleural effusions, new from theprevious CT.    LIVER: Normal morphology.    There is a moderately T2 hyperintense lobulated lesion within segment 2   measuring 5.3 x 4.5 cm (series 4 image 8). There is some suggestion of   discontinuous peripheral nodular enhancement on early postcontrast   sequences, becoming homogeneously enhancing by the delayed phase,   suggestive of a hemangioma. This previously measured 5.3 x 4.5 cm on   12/22/2023 when measured in similar fashion.    There is an additional moderately T2 hyperintense lesion within segment 6   that measures 1.7 x 1.5 cm (series 4 image 19). This lesion is difficult   to accurately characterize on this exam due to its small size and motion   degraded postcontrast imaging, appearing to be predominantly   hypoenhancing during all postcontrast phases. There may be discontinuous   peripheral nodular enhancement appreciated on series 15 image 48. This   lesion is not significantly changed in size since 12/22/2023, measuring   1.7 x 1.4 cm at that time when measured in similar fashion.    BILE DUCTS: Normal caliber.  GALLBLADDER: Partially contracted. Cystic mural thickening at the fundus,   consistent with fundal adenomyomatosis  SPLEEN: Within normal limits.  PANCREAS: Thinly septated cystic lesion in the pancreatic head measuring   1.0 cm (series 3 image 18). No main pancreatic duct dilation.  ADRENALS: Within normal limits.  KIDNEYS/URETERS: No hydronephrosis.    Corresponding to the site of the right lower pole indeterminate lesion   described on the prior CT, there is a focus of ill-defined heterogeneous   enhancement measuring approximately 1.1 cm (series 15 image 62, series 19   image 32) with associated restricted diffusion (series 21 image 106) and   ill-defined slight heterogeneous T2 signal (series 4 image 25).    Cystic lesion in the upper pole of the right kidney measuring 5.1 x 4.0 x   4.9 cm with mildly thickened calcified septations measuring up to 3 mm in   thickness and with rim calcification (series 3 image 17). Calcification   is better appreciated on the previous CT. No definite internal   enhancement.    Cyst in the mid left kidney measuring 2.5 x 2.0 cm.    VISUALIZED PORTIONS:  BOWEL: Periampullary duodenal diverticulum.  URINARY BLADDER: Underdistended with Browne catheter in place.  REPRODUCTIVE ORGANS: Incompletely imaged enlarged prostate.  PERITONEUM: Trace free fluid.  VESSELS: Atherosclerotic changes.  RETROPERITONEUM/LYMPH NODES: No lymphadenopathy. Diffuse restricted   diffusion within the right iliacus muscle, which contains a T2   heterogeneous and T1 intermediate collection measuring approximately 3.6   x 1.4 cm (series 19 image 43, series 3 image 20).  ABDOMINAL WALL: Within normal limits.  BONES: Degenerative changes. Mild loss of height of the L1 and L3   vertebral bodies, similar to the prior CT. Diffuse sclerotic osseous   metastases are better appreciated on the previous CT.    IMPRESSION:  *  Motion degraded exam.  *  Ill-defined focus of heterogeneous enhancement with associated   restricted diffusion within the lower pole of the right kidney,   corresponding to the indeterminate lesion described on the recent CT.   Differential continues to include renal neoplasm, with renal cell   carcinoma not excluded, and infection related to pyelonephritis. Suggest   follow-up imaging after UTI treatment to assess for resolution or   stability.  *  Left hepatic mass measuring 5.3 cm compatible with a hemangioma.   Additional mass in the right hepatic lobe measuring 1.7 cm that is   difficult to characterize due to motion, possibly an additional   hemangioma, and unchanged in size since 12/22/2023.  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 No main pancreatic duct dilation.  *  Heterogeneous collection measuring 3.6 x 1.4 cm within the right   iliacus muscle, favored to represent an intramuscular hematoma, with   infection not excluded. Diffuse restricted diffusion within the right   iliacus muscle could be due to reactive inflammation or myositis.  *  New small bilateral pleural effusions.        < from: US Duplex Venous Lower Ext Complete, Bilateral (03.16.24 @ 13:22) >  FINDINGS:    RIGHT:  Normal compressibility of the RIGHT common femoral, femoral and popliteal   veins.  Doppler examination shows normal spontaneous and phasic flow.  No RIGHT calf vein thrombosis is detected.    LEFT:  Nonocclusive thrombus is seen within the left common femoral vein,   femoral vein, and deep femoral vein.  The popliteal vein is patent.  No LEFT calf vein thrombosis is detected.    IMPRESSION:  Acute deep venous thrombosis: above the knee.    Left deep vein thrombosis involving the common femoral, femoral, and deep   femoral veins, correlating with the finding on recent CT.    < end of copied text >

## 2024-03-19 NOTE — PROGRESS NOTE ADULT - ATTENDING COMMENTS
ucx sensitive, abx deescalated to Rocephin and plan to dc on po cefpodoxime to complete total 10 day course, leukocytosis trending down w steroid titration - per family pt not on prednisone at home, plan to taper steroids off  Eliquis covered  awaiting MR to delineate renal lesion  dispo - JONAH

## 2024-03-19 NOTE — PROGRESS NOTE ADULT - PROBLEM SELECTOR PLAN 1
-pt has chronic browne since dx of prostate cancer, last urine culture grew pseudomonas, s/p abx steroid, and fluid in the ED, likely UTI/pyelonephritis   -TTE 2023 EF 71%   - UCx showing E coli, pending sensitivities   - Zosyn (dced 3/17),  deescalate based on final urine c+s-  - started meropenem (3/17-) as WBC trending up and BPs softer  - started mIVF  - solu-cortef 50mg taper: Q8 3/17, BID today 3/18, QD tomorrow 3/19  -US kidney w/ R renal pole lesion, pending MRI  -browne catheter change   -monitor VS -pt has chronic browne since dx of prostate cancer, last urine culture grew pseudomonas, s/p abx steroid, and fluid in the ED, likely UTI/pyelonephritis   -TTE 2023 EF 71%   - UCx showing E coli, pending sensitivities   - Zosyn (dced 3/17),  deescalate based on final urine c+s-  - started meropenem (3/17-) as WBC trending up and BPs softer  - Now on CTX to complete 10d course  - started mIVF  - solu-cortef 50mg taper: Q8 3/17, BID today 3/18, QD tomorrow 3/19; STOP  -US kidney w/ R renal pole lesion, pending MRI results  -browne catheter change   -monitor VS

## 2024-03-19 NOTE — CONSULT NOTE ADULT - ATTENDING COMMENTS
83 year old man with left common femoral vein acute thrombosis  right iliacus intramuscular hematoma  trend hematocrit  therapeutic anticoagulation at discretion of primary team  Montgomery score 0  not a candidate for [facility] Trial  will follow
83 y.o. male with HLD, CAD s/p PCI w/ stent, SAH, GERD, metastatic prostate cancer to bones/lung and local invasion to rectum, urinary retention with chronic Browne brought to the ED from his urology visit. The patient had rigors and was febrile. Also failed his TOV and browne catheter was placed again in the office. UA positive for WBC, moderate LE consistent with UTI. Patient's findings consistent with severe sepsis. MICU consulted for hypotension. Patient on examination had received 2L of Normal saline with improvement of his MAP to 65. Patient is able to mentate and answer questions appropriately.     Plan   - c/w broad spectrum abx   - Collect Urine and Blood Cx   - Urology consult for Browne management   - Can consider an additional 1L bolus   - Repeat Lactate in the AM     Not a MICU candidate at this time. Please call back if needed.

## 2024-03-19 NOTE — CONSULT NOTE ADULT - ASSESSMENT
84 y/o M with PMHx of HTN, HLD, CAD s/p PCI w/ stent, SAH, GERD, metastatic prostate cancer to bones/lung and local invasion to rectum p/w pyelonephritis and DVT of the L CFA. Patient was started on AC and an incidental 3.6cm right iliacus muscle hematoma was found. Vascular Surgery consulted.    Recommendations:  - No acute surgical intervention, no signs of phlegmasia (Montgomery score 0)  - No signs of arterial insufficiency/ palpable DPs b/l  - Continue with AC  - Would obtain CTA/IR consult for drop in H/H or hemodynamic instability  - Discussed plan with patient  - rest of care per primary team    Discussed w/ vascular surgeon, Dr. Javier Hidalgo, PGY2  C-Team, [id number] 84 y/o M with PMHx of HTN, HLD, CAD s/p PCI w/ stent, SAH, GERD, metastatic prostate cancer to bones/lung and local invasion to rectum p/w pyelonephritis and DVT of the L CFV. Patient was started on AC and an incidental 3.6cm right iliacus muscle hematoma was found. Vascular Surgery consulted.    Recommendations:  - No acute surgical intervention, no signs of phlegmasia (Montgomery score 0)  - No signs of arterial insufficiency/ palpable DPs b/l  - Continue with AC  - Would obtain CTA/IR consult for drop in H/H or hemodynamic instability  - Discussed plan with patient  - rest of care per primary team    Discussed w/ vascular surgeon, Dr. Javier Hidalgo, PGY2  C-Team, [id number]

## 2024-03-19 NOTE — PROGRESS NOTE ADULT - PROBLEM SELECTOR PLAN 6
- Fluids:   - Electrolytes: Will replete to maintain K>4, Phos>3, and Mag>2  - Nutrition: DASH  - Activity: as tolerated   - DVT Prophylaxis: lovenox 60 BID  - Stress Ulcer/GI Prophylaxis: pantoprazole   - Disposition: pending medical management  - Ethics: full code - Fluids:   - Electrolytes: Will replete to maintain K>4, Phos>3, and Mag>2  - Nutrition: DASH  - Activity: as tolerated   - DVT Prophylaxis: lovenox 60 BID  - Stress Ulcer/GI Prophylaxis: pantoprazole   - Disposition: JONAH  - Ethics: full code

## 2024-03-19 NOTE — PROGRESS NOTE ADULT - SUBJECTIVE AND OBJECTIVE BOX
***************************************************************  Tony Pena, PGY2  Internal Medicine   [employer] Preferred  ***************************************************************    WHIT MATTHEWS  83y  MRN: [id number]  03-15-24 (3d)    Patient is a 83y old  Male who presents with a chief complaint of chills (17 Mar 2024 10:07)      SUBJECTIVE / OVERNIGHT EVENTS:   No acute overnight events. Pt seen and examined at bedside. Denies fevers, chills, CP, SOB, Abdominal pain, N/V. States he feels improved compared to yesterday.     12 Point ROS negative with the exception of the above    MEDICATIONS  (STANDING):  aspirin enteric coated 81 milliGRAM(s) Oral daily  enoxaparin Injectable 60 milliGRAM(s) SubCutaneous every 12 hours  finasteride 5 milliGRAM(s) Oral daily  hydrocortisone sodium succinate Injectable 50 milliGRAM(s) IV Push every 8 hours  lactated ringers. 1000 milliLiter(s) (75 mL/Hr) IV Continuous <Continuous>  meropenem  IVPB 1000 milliGRAM(s) IV Intermittent every 12 hours  meropenem  IVPB      oxybutynin 5 milliGRAM(s) Oral three times a day  pantoprazole    Tablet 40 milliGRAM(s) Oral before breakfast  simvastatin 40 milliGRAM(s) Oral at bedtime  tamsulosin 0.4 milliGRAM(s) Oral at bedtime    MEDICATIONS  (PRN):  acetaminophen     Tablet .. 650 milliGRAM(s) Oral every 6 hours PRN Temp greater or equal to 38C (100.4F), Mild Pain (1 - 3)  aluminum hydroxide/magnesium hydroxide/simethicone Suspension 30 milliLiter(s) Oral every 4 hours PRN Dyspepsia  melatonin 3 milliGRAM(s) Oral at bedtime PRN Insomnia  ondansetron Injectable 4 milliGRAM(s) IV Push every 8 hours PRN Nausea and/or Vomiting      OBJECTIVE:  Vital Signs Last 24 Hrs  T(C): 36.3 (18 Mar 2024 05:20), Max: 36.5 (17 Mar 2024 22:38)  T(F): 97.4 (18 Mar 2024 05:20), Max: 97.7 (17 Mar 2024 22:38)  HR: 60 (18 Mar 2024 05:20) (60 - 62)  BP: 119/58 (18 Mar 2024 05:20) (93/51 - 129/59)  BP(mean): --  RR: 18 (18 Mar 2024 05:20) (17 - 18)  SpO2: 97% (18 Mar 2024 05:20) (97% - 97%)    Parameters below as of 18 Mar 2024 05:20  Patient On (Oxygen Delivery Method): room air        I&O's Summary    17 Mar 2024 07:01  -  18 Mar 2024 07:00  --------------------------------------------------------  IN: 440 mL / OUT: 1600 mL / NET: -1160 mL        PHYSICAL EXAM:  GENERAL: Laying comfortably, NAD, thin appearing   HEENT: NCAT, PERRLA, EOMI, no scleral icterus, no LAD  LUNG: CTABL; No wheezes, crackles, or rhonchi  HEART: RRR; normal S1/S2; No murmurs, rubs, or gallops  ABDOMEN: +BS, soft, nontender, nondistended, no HSM; No rebound, guarding, or rigidity  EXTREMITIES:  No LE edema b/l, 2+ Peripheral Pulses, No clubbing or cyanosis  : browne  NEUROLOGY: AOx3, non-focal, sensation intact  PSYCH: calm and cooperative  SKIN: No rashes or lesions    LABS:                        8.6    16.89 )-----------( 179      ( 18 Mar 2024 06:00 )             25.1   03-18    142  |  109<H>  |  28<H>  ----------------------------<  165<H>  3.3<L>   |  22  |  1.03    Ca    8.1<L>      18 Mar 2024 06:00  Phos  2.3     03-18  Mg     2.20     03-18    TPro  5.9<L>  /  Alb  2.9<L>  /  TBili  <0.2  /  DBili  x   /  AST  75<H>  /  ALT  79<H>  /  AlkPhos  97  03-18                          8.9    17.94 )-----------( 143      ( 17 Mar 2024 06:08 )             25.9     Auto Eosinophil # 0.00  / Auto Eosinophil % 0.0   / Auto Neutrophil # 14.99 / Auto Neutrophil % 83.6  / BANDS % x                            8.8    18.69 )-----------( 131      ( 16 Mar 2024 20:29 )             25.9     Auto Eosinophil # x     / Auto Eosinophil % x     / Auto Neutrophil # x     / Auto Neutrophil % x     / BANDS % x                            9.8    16.21 )-----------( 142      ( 16 Mar 2024 09:50 )             30.2     Auto Eosinophil # 0.00  / Auto Eosinophil % 0.0   / Auto Neutrophil # 14.95 / Auto Neutrophil % 88.7  / BANDS % 3.5      03-17    140  |  107  |  28<H>  ----------------------------<  122<H>  3.0<L>   |  23  |  1.16  03-16    139  |  104  |  28<H>  ----------------------------<  129<H>  3.6   |  21<L>  |  1.36<H>  03-15    139  |  101  |  29<H>  ----------------------------<  108<H>  3.9   |  23  |  1.28    Ca    8.0<L>      17 Mar 2024 06:08  Ca    8.2<L>      16 Mar 2024 09:50  Ca    9.3      15 Mar 2024 16:55  Phos  3.2     03-17  Mg     2.10     03-17    TPro  5.8<L>  /  Alb  2.8<L>  /  TBili  0.2  /  DBili  x   /  AST  140<H>  /  ALT  111<H>  /  AlkPhos  110  03-17  TPro  6.4  /  Alb  3.3  /  TBili  0.3  /  DBili  x   /  AST  300<H>  /  ALT  201<H>  /  AlkPhos  162<H>  03-16  TPro  7.5  /  Alb  3.8  /  TBili  0.4  /  DBili  x   /  AST  35  /  ALT  19  /  AlkPhos  117  03-15    PT/INR - ( 17 Mar 2024 06:08 )   PT: 13.7 sec;   INR: 1.22 ratio         PTT - ( 17 Mar 2024 06:08 )  PTT:34.7 sec      Urinalysis Basic - ( 17 Mar 2024 06:08 )    Color: x / Appearance: x / SG: x / pH: x  Gluc: 122 mg/dL / Ketone: x  / Bili: x / Urobili: x   Blood: x / Protein: x / Nitrite: x   Leuk Esterase: x / RBC: x / WBC x   Sq Epi: x / Non Sq Epi: x / Bacteria: x        Lactate, Blood: 3.2 mmol/L (03-16 @ 09:50)  Lactate, Blood: 2.7 mmol/L (03-15 @ 19:35)    CAPILLARY BLOOD GLUCOSE            RADIOLOGY & ADDITIONAL TESTS:  CT Abdomen and Pelvis w/ IV Cont:   ACC: [id number] EXAM:  CT ABDOMEN AND PELVIS IC   ORDERED BY: JARROD DURAN     PROCEDURE DATE:  03/15/2024          INTERPRETATION:  CLINICAL INFORMATION: Abdominal pain, rash, prostate   cancer    COMPARISON: CT abdomen pelvis 12/26/2023, MR pelvis 9/28/2023    CONTRAST/COMPLICATIONS:  IV Contrast: Omnipaque 350  90 cc administered   10 cc discarded  Oral Contrast: NONE  Complications: None reported at time of study completion    PROCEDURE:  CT of the Abdomen and Pelvis was performed.  Sagittal and coronal reformats were performed.    FINDINGS:  LOWER CHEST: Coronary artery calcifications.    LIVER: Similar 5 cm left hepatic lobe lesion with findings suggestive of   hemangioma. Similar 1.5 cm segment 6 hypoattenuating lesion.  BILE DUCTS: Normal caliber.  GALLBLADDER: Fundal adenomyomatosis.  SPLEEN: Within normal limits.  PANCREAS: Within normal limits.  ADRENALS: Within normal limits.  KIDNEYS/URETERS: No hydronephrosis. New right lower pole ill-defined area   of hypoenhancement measuring 1.8 x 1.2 cm (301-50). Similar right upper   pole cystic lesion with thick peripheral calcifications. Additional   simple 2.2 cm left renal cyst.    BLADDER: Decompressed with Browne catheter in place.  REPRODUCTIVE ORGANS: The prostate gland is markedly enlarged measuring   approximately 5.1 x 6.4 x 6.7 cm with hypertrophy of the median lobe   protruding into the bladder base.    BOWEL: No bowel obstruction. Appendix is normal. Prostate extension into   the anterior rectal wall is better seen on prior MR.  PERITONEUM: No ascites.  VESSELS: Mild to moderate atherosclerosis of the abdominal aorta, which   is somewhat tortuous. No evidence of aortic aneurysm. 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 There is asymmetric   hypoenhancement of the left common and superficial femoral vein relative   to the right.  RETROPERITONEUM/LYMPH NODES: No lymphadenopathy.  ABDOMINAL WALL: Unremarkable.  BONES: Diffuse osseous metastasis, similar to prior. The bones are   osteopenic. There is chronic mild height loss of the L1 and L3 vertebral   bodies again noted. There is reversal of the normal lumbar lordosis.    IMPRESSION:    There is an apparent indeterminate right lower pole renal lesion   measuring 1.8 x 1.2 cm, which could represent focal pyelonephritis or a   mass Recommend further evaluation with contrast-enhanced MRI.    Asymmetric enhancement of the left common and superficial femoral vein   relative to the right is nonspecific and may represent differential   venous drainage, the underlying filling defect cannot be excluded. Left   lower extremity DVT ultrasound is suggested to exclude underlying DVT.    There is extensive blastic osseous metastatic disease at the axial and   appendicular skeleton again noted, likely related to metastatic prostate   cancer. The prostate gland is also markedly enlarged and there is loss of   fat plane between the posterior prostate and anterior rectumagain noted.    Additional chronic and incidental findings are present as detailed above.       US Duplex Venous Lower Ext Complete, Bilateral (03.16.24 @ 13:22)  RIGHT:  Normal compressibility of the RIGHT common femoral, femoral and popliteal   veins.  Doppler examination shows normal spontaneous and phasic flow.  No RIGHT calf vein thrombosis is detected.    LEFT:  Nonocclusive thrombus is seen within the left common femoral vein,   femoral vein, and deep femoral vein.  The popliteal vein is patent.  No LEFT calf vein thrombosis is detected.    IMPRESSION:  Acute deep venous thrombosis: above the knee.    Left deep vein thrombosis involving the common femoral, femoral, and deep   femoral veins, correlating with the finding on recent CT. PROGRESS NOTE:   Authored by Dr. Fernando Mills MD (PGY-1). Pager [facility] [phone] / [facility]     Patient is a 83y old  Male who presents with a chief complaint of chills (19 Mar 2024 07:56)      SUBJECTIVE / OVERNIGHT EVENTS:  No acute events overnight.     ADDITIONAL REVIEW OF SYSTEMS:  Patient denies fevers, chills, chest pain, shortness of breath, nausea, abdominal pain, diarrhea, constipation, dysuria, leg swelling, headache, light headedness.    MEDICATIONS  (STANDING):  apixaban 10 milliGRAM(s) Oral every 12 hours  aspirin enteric coated 81 milliGRAM(s) Oral daily  cefTRIAXone   IVPB 1000 milliGRAM(s) IV Intermittent every 24 hours  finasteride 5 milliGRAM(s) Oral daily  lactated ringers. 1000 milliLiter(s) (75 mL/Hr) IV Continuous <Continuous>  oxybutynin 5 milliGRAM(s) Oral three times a day  pantoprazole    Tablet 40 milliGRAM(s) Oral before breakfast  potassium chloride    Tablet ER 40 milliEquivalent(s) Oral every 4 hours  simvastatin 40 milliGRAM(s) Oral at bedtime  tamsulosin 0.4 milliGRAM(s) Oral at bedtime    MEDICATIONS  (PRN):  acetaminophen     Tablet .. 650 milliGRAM(s) Oral every 6 hours PRN Temp greater or equal to 38C (100.4F), Mild Pain (1 - 3)  aluminum hydroxide/magnesium hydroxide/simethicone Suspension 30 milliLiter(s) Oral every 4 hours PRN Dyspepsia  melatonin 3 milliGRAM(s) Oral at bedtime PRN Insomnia  ondansetron Injectable 4 milliGRAM(s) IV Push every 8 hours PRN Nausea and/or Vomiting      CAPILLARY BLOOD GLUCOSE        I&O's Summary    18 Mar 2024 07:01  -  19 Mar 2024 07:00  --------------------------------------------------------  IN: 240 mL / OUT: 1550 mL / NET: -1310 mL        PHYSICAL EXAM:  Vital Signs Last 24 Hrs  T(C): 36.8 (19 Mar 2024 06:25), Max: 36.8 (18 Mar 2024 22:17)  T(F): 98.3 (19 Mar 2024 06:25), Max: 98.3 (19 Mar 2024 06:25)  HR: 60 (19 Mar 2024 06:25) (60 - 67)  BP: 124/55 (19 Mar 2024 06:25) (124/55 - 135/70)  BP(mean): --  RR: 18 (19 Mar 2024 06:25) (17 - 18)  SpO2: 100% (19 Mar 2024 06:25) (96% - 100%)    Parameters below as of 19 Mar 2024 06:25  Patient On (Oxygen Delivery Method): room air        CONSTITUTIONAL: NAD, well-developed  RESPIRATORY: Normal respiratory effort; lungs are clear to auscultation bilaterally  CARDIOVASCULAR: Regular rate and rhythm, normal S1 and S2, no murmur/rub/gallop; No lower extremity edema; Peripheral pulses are 2+ bilaterally  ABDOMEN: Nontender to palpation, normoactive bowel sounds, no rebound/guarding; No hepatosplenomegaly  MUSCLOSKELETAL: no clubbing or cyanosis of digits; no joint swelling or tenderness to palpation  PSYCH: A+O to person, place, and time; affect appropriate    LABS:                        8.8    13.96 )-----------( 203      ( 19 Mar 2024 06:35 )             26.2     03-19    143  |  111<H>  |  26<H>  ----------------------------<  129<H>  3.2<L>   |  24  |  1.00    Ca    8.1<L>      19 Mar 2024 06:35  Phos  3.0     03-19  Mg     2.00     03-19    TPro  5.5<L>  /  Alb  2.7<L>  /  TBili  <0.2  /  DBili  x   /  AST  61<H>  /  ALT  73<H>  /  AlkPhos  88  03-19      Urinalysis Basic - ( 19 Mar 2024 06:35 )    Color: x / Appearance: x / SG: x / pH: x  Gluc: 129 mg/dL / Ketone: x  / Bili: x / Urobili: x   Blood: x / Protein: x / Nitrite: x   Leuk Esterase: x / RBC: x / WBC x   Sq Epi: x / Non Sq Epi: x / Bacteria: x      Tele Reviewed:    RADIOLOGY & ADDITIONAL TESTS:  Results Reviewed:   Imaging Personally Reviewed:  Electrocardiogram Personally Reviewed:     PROGRESS NOTE:   Authored by Dr. Fernando Mills MD (PGY-1). Pager [facility] [phone] / [facility]     Patient is a 83y old  Male who presents with a chief complaint of chills       SUBJECTIVE / OVERNIGHT EVENTS:  No acute events overnight.     ADDITIONAL REVIEW OF SYSTEMS:  Patient denies fevers, chills, chest pain, shortness of breath, nausea, abdominal pain, diarrhea, constipation, dysuria, leg swelling, headache, light headedness.    MEDICATIONS  (STANDING):  apixaban 10 milliGRAM(s) Oral every 12 hours  aspirin enteric coated 81 milliGRAM(s) Oral daily  cefTRIAXone   IVPB 1000 milliGRAM(s) IV Intermittent every 24 hours  finasteride 5 milliGRAM(s) Oral daily  lactated ringers. 1000 milliLiter(s) (75 mL/Hr) IV Continuous <Continuous>  oxybutynin 5 milliGRAM(s) Oral three times a day  pantoprazole    Tablet 40 milliGRAM(s) Oral before breakfast  potassium chloride    Tablet ER 40 milliEquivalent(s) Oral every 4 hours  simvastatin 40 milliGRAM(s) Oral at bedtime  tamsulosin 0.4 milliGRAM(s) Oral at bedtime    MEDICATIONS  (PRN):  acetaminophen     Tablet .. 650 milliGRAM(s) Oral every 6 hours PRN Temp greater or equal to 38C (100.4F), Mild Pain (1 - 3)  aluminum hydroxide/magnesium hydroxide/simethicone Suspension 30 milliLiter(s) Oral every 4 hours PRN Dyspepsia  melatonin 3 milliGRAM(s) Oral at bedtime PRN Insomnia  ondansetron Injectable 4 milliGRAM(s) IV Push every 8 hours PRN Nausea and/or Vomiting      CAPILLARY BLOOD GLUCOSE        I&O's Summary    18 Mar 2024 07:01  -  19 Mar 2024 07:00  --------------------------------------------------------  IN: 240 mL / OUT: 1550 mL / NET: -1310 mL        PHYSICAL EXAM:  Vital Signs Last 24 Hrs  T(C): 36.8 (19 Mar 2024 06:25), Max: 36.8 (18 Mar 2024 22:17)  T(F): 98.3 (19 Mar 2024 06:25), Max: 98.3 (19 Mar 2024 06:25)  HR: 60 (19 Mar 2024 06:25) (60 - 67)  BP: 124/55 (19 Mar 2024 06:25) (124/55 - 135/70)  BP(mean): --  RR: 18 (19 Mar 2024 06:25) (17 - 18)  SpO2: 100% (19 Mar 2024 06:25) (96% - 100%)    Parameters below as of 19 Mar 2024 06:25  Patient On (Oxygen Delivery Method): room air        CONSTITUTIONAL: NAD, well-developed  RESPIRATORY: Normal respiratory effort; lungs are clear to auscultation bilaterally  CARDIOVASCULAR: Regular rate and rhythm, normal S1 and S2, no murmur/rub/gallop; No lower extremity edema; Peripheral pulses are 2+ bilaterally  ABDOMEN: Nontender to palpation, normoactive bowel sounds, no rebound/guarding; No hepatosplenomegaly  MUSCLOSKELETAL: no clubbing or cyanosis of digits; no joint swelling or tenderness to palpation  PSYCH: A+O to person, place, and time; affect appropriate    LABS:                        8.8    13.96 )-----------( 203      ( 19 Mar 2024 06:35 )             26.2     03-19    143  |  111<H>  |  26<H>  ----------------------------<  129<H>  3.2<L>   |  24  |  1.00    Ca    8.1<L>      19 Mar 2024 06:35  Phos  3.0     03-19  Mg     2.00     03-19    TPro  5.5<L>  /  Alb  2.7<L>  /  TBili  <0.2  /  DBili  x   /  AST  61<H>  /  ALT  73<H>  /  AlkPhos  88  03-19      Urinalysis Basic - ( 19 Mar 2024 06:35 )    Color: x / Appearance: x / SG: x / pH: x  Gluc: 129 mg/dL / Ketone: x  / Bili: x / Urobili: x   Blood: x / Protein: x / Nitrite: x   Leuk Esterase: x / RBC: x / WBC x   Sq Epi: x / Non Sq Epi: x / Bacteria: x      Tele Reviewed:    RADIOLOGY & ADDITIONAL TESTS:  Results Reviewed:   Imaging Personally Reviewed:  Electrocardiogram Personally Reviewed:

## 2024-03-20 ENCOUNTER — TRANSCRIPTION ENCOUNTER (OUTPATIENT)
Age: 84
End: 2024-03-20

## 2024-03-20 LAB
ALBUMIN SERPL ELPH-MCNC: 3 G/DL — LOW (ref 3.3–5)
ALP SERPL-CCNC: 88 U/L — SIGNIFICANT CHANGE UP (ref 40–120)
ALT FLD-CCNC: 77 U/L — HIGH (ref 4–41)
ANION GAP SERPL CALC-SCNC: 9 MMOL/L — SIGNIFICANT CHANGE UP (ref 7–14)
AST SERPL-CCNC: 65 U/L — HIGH (ref 4–40)
BASOPHILS # BLD AUTO: 0.01 K/UL — SIGNIFICANT CHANGE UP (ref 0–0.2)
BASOPHILS NFR BLD AUTO: 0.1 % — SIGNIFICANT CHANGE UP (ref 0–2)
BILIRUB SERPL-MCNC: 0.2 MG/DL — SIGNIFICANT CHANGE UP (ref 0.2–1.2)
BUN SERPL-MCNC: 20 MG/DL — SIGNIFICANT CHANGE UP (ref 7–23)
CALCIUM SERPL-MCNC: 8.5 MG/DL — SIGNIFICANT CHANGE UP (ref 8.4–10.5)
CHLORIDE SERPL-SCNC: 111 MMOL/L — HIGH (ref 98–107)
CO2 SERPL-SCNC: 25 MMOL/L — SIGNIFICANT CHANGE UP (ref 22–31)
CREAT SERPL-MCNC: 0.89 MG/DL — SIGNIFICANT CHANGE UP (ref 0.5–1.3)
CULTURE RESULTS: SIGNIFICANT CHANGE UP
CULTURE RESULTS: SIGNIFICANT CHANGE UP
EGFR: 84 ML/MIN/1.73M2 — SIGNIFICANT CHANGE UP
EOSINOPHIL # BLD AUTO: 0.39 K/UL — SIGNIFICANT CHANGE UP (ref 0–0.5)
EOSINOPHIL NFR BLD AUTO: 4.1 % — SIGNIFICANT CHANGE UP (ref 0–6)
GLUCOSE SERPL-MCNC: 82 MG/DL — SIGNIFICANT CHANGE UP (ref 70–99)
HCT VFR BLD CALC: 26.6 % — LOW (ref 39–50)
HGB BLD-MCNC: 9 G/DL — LOW (ref 13–17)
IANC: 6.06 K/UL — SIGNIFICANT CHANGE UP (ref 1.8–7.4)
IMM GRANULOCYTES NFR BLD AUTO: 1.3 % — HIGH (ref 0–0.9)
LYMPHOCYTES # BLD AUTO: 2.25 K/UL — SIGNIFICANT CHANGE UP (ref 1–3.3)
LYMPHOCYTES # BLD AUTO: 23.6 % — SIGNIFICANT CHANGE UP (ref 13–44)
MAGNESIUM SERPL-MCNC: 2 MG/DL — SIGNIFICANT CHANGE UP (ref 1.6–2.6)
MCHC RBC-ENTMCNC: 31 PG — SIGNIFICANT CHANGE UP (ref 27–34)
MCHC RBC-ENTMCNC: 33.8 GM/DL — SIGNIFICANT CHANGE UP (ref 32–36)
MCV RBC AUTO: 91.7 FL — SIGNIFICANT CHANGE UP (ref 80–100)
MONOCYTES # BLD AUTO: 0.72 K/UL — SIGNIFICANT CHANGE UP (ref 0–0.9)
MONOCYTES NFR BLD AUTO: 7.5 % — SIGNIFICANT CHANGE UP (ref 2–14)
NEUTROPHILS # BLD AUTO: 6.06 K/UL — SIGNIFICANT CHANGE UP (ref 1.8–7.4)
NEUTROPHILS NFR BLD AUTO: 63.4 % — SIGNIFICANT CHANGE UP (ref 43–77)
NRBC # BLD: 0 /100 WBCS — SIGNIFICANT CHANGE UP (ref 0–0)
NRBC # FLD: 0.02 K/UL — HIGH (ref 0–0)
PHOSPHATE SERPL-MCNC: 3.3 MG/DL — SIGNIFICANT CHANGE UP (ref 2.5–4.5)
PLATELET # BLD AUTO: 232 K/UL — SIGNIFICANT CHANGE UP (ref 150–400)
POTASSIUM SERPL-MCNC: 3.8 MMOL/L — SIGNIFICANT CHANGE UP (ref 3.5–5.3)
POTASSIUM SERPL-SCNC: 3.8 MMOL/L — SIGNIFICANT CHANGE UP (ref 3.5–5.3)
PROT SERPL-MCNC: 5.6 G/DL — LOW (ref 6–8.3)
RBC # BLD: 2.9 M/UL — LOW (ref 4.2–5.8)
RBC # FLD: 14 % — SIGNIFICANT CHANGE UP (ref 10.3–14.5)
SODIUM SERPL-SCNC: 145 MMOL/L — SIGNIFICANT CHANGE UP (ref 135–145)
SPECIMEN SOURCE: SIGNIFICANT CHANGE UP
SPECIMEN SOURCE: SIGNIFICANT CHANGE UP
WBC # BLD: 9.55 K/UL — SIGNIFICANT CHANGE UP (ref 3.8–10.5)
WBC # FLD AUTO: 9.55 K/UL — SIGNIFICANT CHANGE UP (ref 3.8–10.5)

## 2024-03-20 PROCEDURE — 99232 SBSQ HOSP IP/OBS MODERATE 35: CPT | Mod: GC

## 2024-03-20 RX ORDER — BENZOCAINE AND MENTHOL 5; 1 G/100ML; G/100ML
1 LIQUID ORAL ONCE
Refills: 0 | Status: COMPLETED | OUTPATIENT
Start: 2024-03-20 | End: 2024-03-20

## 2024-03-20 RX ADMIN — FINASTERIDE 5 MILLIGRAM(S): 5 TABLET, FILM COATED ORAL at 11:07

## 2024-03-20 RX ADMIN — Medication 5 MILLIGRAM(S): at 06:27

## 2024-03-20 RX ADMIN — TAMSULOSIN HYDROCHLORIDE 0.4 MILLIGRAM(S): 0.4 CAPSULE ORAL at 21:39

## 2024-03-20 RX ADMIN — BENZOCAINE AND MENTHOL 1 LOZENGE: 5; 1 LIQUID ORAL at 20:10

## 2024-03-20 RX ADMIN — SIMVASTATIN 40 MILLIGRAM(S): 20 TABLET, FILM COATED ORAL at 21:41

## 2024-03-20 RX ADMIN — APIXABAN 10 MILLIGRAM(S): 2.5 TABLET, FILM COATED ORAL at 17:30

## 2024-03-20 RX ADMIN — Medication 5 MILLIGRAM(S): at 22:50

## 2024-03-20 RX ADMIN — CEFTRIAXONE 100 MILLIGRAM(S): 500 INJECTION, POWDER, FOR SOLUTION INTRAMUSCULAR; INTRAVENOUS at 06:24

## 2024-03-20 RX ADMIN — Medication 81 MILLIGRAM(S): at 11:08

## 2024-03-20 RX ADMIN — Medication 5 MILLIGRAM(S): at 13:30

## 2024-03-20 RX ADMIN — PANTOPRAZOLE SODIUM 40 MILLIGRAM(S): 20 TABLET, DELAYED RELEASE ORAL at 06:25

## 2024-03-20 RX ADMIN — APIXABAN 10 MILLIGRAM(S): 2.5 TABLET, FILM COATED ORAL at 06:25

## 2024-03-20 NOTE — DISCHARGE NOTE PROVIDER - HOSPITAL COURSE
HPI:  Patient is an 82 y/o M with PMHx of HTN, HLD, CAD s/p PCI w/ stent, SAH, GERD, metastatic prostate cancer to bones/lung and local invasion to rectum,  urinary retention with chronic browne, recent admission to [facility] in December 2023 for proctitis and UTI who presents from urologist Dr. Pedro' office with fever and chills. Patient went to his urologist today for TOV and 2nd injection for prostate cancer tx. he failed TOV and browne was put back in. he feel cold, started to shake wildly in the urology office. was concerning for injection reaction, mild rash was noted around injection site and leg, afebrile but feels hot,  benadryl was given, no epi was given.  the duaghter requested amblance for him to be transferred to ED. He has not started any new medications recently. Daughter states he was in his usual state of health and feeling well otherwise previously. He endosred some back pain.  Denies CP, cough, SOB, nausea, vomiting, diarrhea, changes in appetite.     In the ED, patient febrile to 104, tachycardic to 140s, hypotensive to 80s/50s.  Labs remarkable for WBC 1.61, pH 7.43, lactate 3. UA with moderate LE, few bacteria, and moderate blood. CT A/P with right lower pole renal lesion measuring 1.8x1.2 cm, which could represent focal pyelonephritis or a mass, asymmetric enhancement of the left common and superficial femoral vein, LLE DVT US suggested to exclude underlying DVT. Urology consulted for leaking browne catheter. RVP negative. CXR clear lungs.Patient was given 2 L IVF, vancomycin, Solu-cortef *1, and Zosyn.    Hospital Course:  New L com-fem DVT seen on duplex and pt subsequently placed on full AC; Initially placed on Zosyn for UTI but switched to Bandar given worsening WBC's and soft pressures.   UCx w/ pansensitive E coli, switching to ceftriaxone tomorrow for 7 day course, steroid taper (hydrocortisone 50 BID today, QD tomorrow, and then home dose). Urology team confirmed no dysfxn to browne.   Discharged to [facility] on Eliquis and completion of 10d Abx with Cefpodoxime;     US kidney ordered for suspicion for pyelo with R lower pole lesion.   MRI R psoas hematoma and indeterminate Kidney lesion.         Important Medication Changes and Reason:  - Cefpodoxime  - Eliquis    Active or Pending Issues Requiring Follow-up:    Advanced Directives:   [ ] Full code  [ ] DNR  [ ] Hospice    Discharge Diagnoses:  DVT  Pyelo  Kidney Lesion  Psoas Hematoma         HPI:  Patient is an 82 y/o M with PMHx of HTN, HLD, CAD s/p PCI w/ stent, SAH, GERD, metastatic prostate cancer to bones/lung and local invasion to rectum,  urinary retention with chronic browne, recent admission to [facility] in December 2023 for proctitis and UTI who presents from urologist Dr. Pedro' office with fever and chills. Patient went to his urologist today for TOV and 2nd injection for prostate cancer tx. he failed TOV and browne was put back in. he feel cold, started to shake wildly in the urology office. was concerning for injection reaction, mild rash was noted around injection site and leg, afebrile but feels hot,  benadryl was given, no epi was given.  the duaghter requested amblance for him to be transferred to ED. He has not started any new medications recently. Daughter states he was in his usual state of health and feeling well otherwise previously. He endosred some back pain.  Denies CP, cough, SOB, nausea, vomiting, diarrhea, changes in appetite.     In the ED, patient febrile to 104, tachycardic to 140s, hypotensive to 80s/50s.  Labs remarkable for WBC 1.61, pH 7.43, lactate 3. UA with moderate LE, few bacteria, and moderate blood. CT A/P with right lower pole renal lesion measuring 1.8x1.2 cm, which could represent focal pyelonephritis or a mass, asymmetric enhancement of the left common and superficial femoral vein, LLE DVT US suggested to exclude underlying DVT. Urology consulted for leaking browne catheter. RVP negative. CXR clear lungs.Patient was given 2 L IVF, vancomycin, Solu-cortef *1, and Zosyn.    Hospital Course:  New L com-fem DVT seen on duplex and pt subsequently placed on full AC; Initially placed on Zosyn for UTI but switched to Bandar given worsening WBC's and soft pressures.   UCx w/ pansensitive E coli, switching to ceftriaxone tomorrow for 7 day course, steroid taper (hydrocortisone 50 BID today, QD tomorrow, and then home dose). Urology team confirmed no dysfxn to browne.   Discharged to [facility] on Eliquis and completion of 10d Abx with Cefpodoxime;     US kidney ordered for suspicion for pyelo with R lower pole lesion.   MRI R psoas hematoma and indeterminate Kidney lesion.     Important Medication Changes and Reason:  - Cefpodoxime  - Eliquis    Active or Pending Issues Requiring Follow-up:    Advanced Directives:   [ ] Full code  [ ] DNR  [ ] Hospice    Discharge Diagnoses:  DVT  Pyelo  Kidney Lesion  Psoas Hematoma         HPI:  Patient is an 84 y/o M with PMHx of HTN, HLD, CAD s/p PCI w/ stent, SAH, GERD, metastatic prostate cancer to bones/lung and local invasion to rectum,  urinary retention with chronic browne, recent admission to [facility] in December 2023 for proctitis and UTI who presents from urologist Dr. Pedro' office with fever and chills. Patient went to his urologist today for TOV and 2nd injection for prostate cancer tx. he failed TOV and browne was put back in. he feel cold, started to shake wildly in the urology office. was concerning for injection reaction, mild rash was noted around injection site and leg, afebrile but feels hot,  benadryl was given, no epi was given.  the duaghter requested amblance for him to be transferred to ED. He has not started any new medications recently. Daughter states he was in his usual state of health and feeling well otherwise previously. He endosred some back pain.  Denies CP, cough, SOB, nausea, vomiting, diarrhea, changes in appetite.     In the ED, patient febrile to 104, tachycardic to 140s, hypotensive to 80s/50s.  Labs remarkable for WBC 1.61, pH 7.43, lactate 3. UA with moderate LE, few bacteria, and moderate blood. CT A/P with right lower pole renal lesion measuring 1.8x1.2 cm, which could represent focal pyelonephritis or a mass, asymmetric enhancement of the left common and superficial femoral vein, LLE DVT US suggested to exclude underlying DVT. Urology consulted for leaking browne catheter. RVP negative. CXR clear lungs.Patient was given 2 L IVF, vancomycin, Solu-cortef *1, and Zosyn.    Hospital Course:  New L com-fem DVT seen on duplex and pt subsequently placed on full AC with Eliquis; Initially placed on Zosyn for UTI but switched to Bandar given worsening WBC's and soft pressures; sensitivites later came back and he was transitioned to ceftriaxone and completed a 10-day total course of antibiotics. His course was complicated by adrenal suppression, likely 2/2 sepsis and his immunocompromised status (prostate cancer) and completed a steroid taper. Urology was consulted and confirmed there was no dysfunction of the Browne. He was incidentally found to have a right lower pole lesion on ultrasound kidney while evaluating for pyelo and a right ilicus hematoma. Family was okay with him being on AC with plans to repeat MRA of the R-ilicus hematoma and repeat imaging of the right kidney lesion outpatient.     Important Medication Changes and Reason:  - Eliquis 5mg BID    Active or Pending Issues Requiring Follow-up:  - MRA to monitor progression of R-ilicus hematoma and right kidney lesion    Advanced Directives:   [X] Full code  [ ] DNR  [ ] Hospice    Discharge Diagnoses:  DVT  Pyelo  Kidney Lesion  Psoas Hematoma         HPI:  Patient is an 82 y/o M with PMHx of HTN, HLD, CAD s/p PCI w/ stent, SAH, GERD, metastatic prostate cancer to bones/lung and local invasion to rectum,  urinary retention with chronic browne, recent admission to [facility] in December 2023 for proctitis and UTI who presents from urologist Dr. Pedro' office with fever and chills. Patient went to his urologist today for TOV and 2nd injection for prostate cancer tx. he failed TOV and browne was put back in. he feel cold, started to shake wildly in the urology office. was concerning for injection reaction, mild rash was noted around injection site and leg, afebrile but feels hot,  benadryl was given, no epi was given.  the daughter requested ambulance for him to be transferred to ED. He has not started any new medications recently. Daughter states he was in his usual state of health and feeling well otherwise previously. He endorsed some back pain.  Denies CP, cough, SOB, nausea, vomiting, diarrhea, changes in appetite.     In the ED, patient febrile to 104, tachycardic to 140s, hypotensive to 80s/50s.  Labs remarkable for WBC 1.61, pH 7.43, lactate 3. UA with moderate LE, few bacteria, and moderate blood. CT A/P with right lower pole renal lesion measuring 1.8x1.2 cm, which could represent focal pyelonephritis or a mass, asymmetric enhancement of the left common and superficial femoral vein, LLE DVT US suggested to exclude underlying DVT. Urology consulted for leaking browne catheter. RVP negative. CXR clear lungs. Patient was given 2 L IVF, vancomycin, Solu-cortef *1, and Zosyn.    Hospital Course:  New L com-fem DVT seen on duplex and pt subsequently placed on full AC with Eliquis; Initially placed on Zosyn for UTI but switched to Bandar given worsening WBC's and soft pressures; sensitivities later came back and he was transitioned to ceftriaxone and completed a 10-day total course of antibiotics. His course was complicated by adrenal suppression, likely 2/2 sepsis and his immunocompromised status (prostate cancer) and completed a steroid taper. Urology was consulted and confirmed there was no dysfunction of the Browne. He was incidentally found to have a right lower pole lesion on ultrasound kidney while evaluating for pyelo and a right ilicus hematoma. Family was amenable with him being on AC with plans to repeat MRA of the R-ilicus hematoma and repeat imaging of the right kidney lesion outpatient.     Important Medication Changes and Reason:  - Eliquis 5mg BID    Active or Pending Issues Requiring Follow-up:  - MRA to monitor progression of R-ilicus hematoma and right kidney lesion    Advanced Directives:   [X] Full code  [ ] DNR  [ ] Hospice    Discharge Diagnoses:  DVT  Pyelo  Kidney Lesion  Psoas Hematoma

## 2024-03-20 NOTE — DISCHARGE NOTE PROVIDER - NSDCCPCAREPLAN_GEN_ALL_CORE_FT
PRINCIPAL DISCHARGE DIAGNOSIS  Diagnosis: Sepsis secondary to UTI  Assessment and Plan of Treatment: On admission you were expressing symptoms of fevers and chills and found to have labs and exams consistent with a severe infection or sepsis. After further investigation the source of your infection was from your urinary tract and you were subsequently placed on antibiotics. Upon discharge you will need to take an oral antibiotic for a few more days.   Contact a health care provider if:  Your symptoms get worse or do not get better with treatment.  You have new UTI symptoms.  Get help right away if:  You have new symptoms of sepsis or symptoms that do not go away after treatment. These may include:  A fever of 101.3°F (38.5°C) or higher.  Low body temperature of 96.8°F (36°C) or lower.  Chills.  Severe pain.  Trouble breathing.  Confusion.  Sleepiness.  Nausea and vomiting.      SECONDARY DISCHARGE DIAGNOSES  Diagnosis: DVT, lower extremity  Assessment and Plan of Treatment: During our evaluation you were also found to have a blood clot in your left leg which we placed you on blood thinner medications for to prevent the thrombus from expanding or new ones to form. Additional imaging tests revealed a small hematoma in your right Illacus muscle which our Vascular team deemed non-threatening and allowed your to remain on your blood thinner medication despite it.  Contact a health care provider if:  You miss a dose of your blood thinner.  You have unusual bruising or other color changes.  You have new or worse pain, swelling, or redness in an arm or a leg.  You have worsening numbness or tingling in an arm or a leg.  You have a significant color change (pale or blue) in the extremity that has the DVT.  Get help right away if:  You have signs or symptoms that a blood clot has moved to the lungs. These may include:  Shortness of breath.  Chest pain.  Fast or irregular heartbeats (palpitations).  Light-headedness, dizziness, or fainting.  Coughing up blood.  You have signs or symptoms that your blood is too thin. These may include:  Blood in your vomit, stool, or urine.  A cut that will not stop bleeding.  A menstrual period that is heavier than usual.  A severe headache or confusion.    Diagnosis: Kidney lesion  Assessment and Plan of Treatment: We also found a lesion on imaging of your kidney potentially related to your infection extending into your kidney; however the scan is ultimately indeterminate and we'd like you to follow up with your PCP for another CT scan of the kidney once your infection has resolved to ensure we can rule out any other an occult cancer.     PRINCIPAL DISCHARGE DIAGNOSIS  Diagnosis: Sepsis secondary to UTI  Assessment and Plan of Treatment: On admission you were expressing symptoms of fevers and chills and found to have labs and exams consistent with a severe infection or sepsis. After further investigation the source of your infection was from your urinary tract and you were subsequently placed on antibiotics. You completed a 10-day course of antibiotics had have been stable since.   Contact a health care provider if:  Your symptoms get worse or do not get better with treatment.  You have new UTI symptoms.  Get help right away if:  You have new symptoms of sepsis or symptoms that do not go away after treatment. These may include:  A fever of 101.3°F (38.5°C) or higher.  Low body temperature of 96.8°F (36°C) or lower.  Chills.  Severe pain.  Trouble breathing.  Confusion.  Sleepiness.  Nausea and vomiting.      SECONDARY DISCHARGE DIAGNOSES  Diagnosis: DVT, lower extremity  Assessment and Plan of Treatment: During our evaluation you were also found to have a blood clot in your left leg which we placed you on blood thinner medications for to prevent the thrombus from expanding or new ones to form. You were discharged on Eliquis 5mg BID. Additional imaging tests revealed a small hematoma in your right Illacus muscle which our Vascular team deemed non-threatening and allowed your to remain on your blood thinner medication despite it. Because of the potential for further bleeds while on Eliquis, please follow up with your primary care physician closely and inform him you were stared on this medication for a clot at your follow up visit.   Contact a health care provider if:  You miss a dose of your blood thinner.  You have unusual bruising or other color changes.  You have new or worse pain, swelling, or redness in an arm or a leg.  You have worsening numbness or tingling in an arm or a leg.  You have a significant color change (pale or blue) in the extremity that has the DVT.  Get help right away if:  You have signs or symptoms that a blood clot has moved to the lungs. These may include:  Shortness of breath.  Chest pain.  Fast or irregular heartbeats (palpitations).  Light-headedness, dizziness, or fainting.  Coughing up blood.  You have signs or symptoms that your blood is too thin. These may include:  Blood in your vomit, stool, or urine.  A cut that will not stop bleeding.  A menstrual period that is heavier than usual.  A severe headache or confusion.    Diagnosis: Kidney lesion  Assessment and Plan of Treatment: We also found a lesion on imaging of your kidney potentially related to your infection extending into your kidney; however the scan is ultimately indeterminate and we'd like you to follow up with your PCP for another CT scan of the kidney once your infection has resolved to ensure we can rule out any other an occult cancer.    Diagnosis: Hematoma  Assessment and Plan of Treatment: You were found to have a hematoma on your right thigh that was evaluated and determined to be non-life threatening by the vascular team. Please have a repeat MR of the right leg outpatient to monitor if the bleeding is stable.

## 2024-03-20 NOTE — DISCHARGE NOTE PROVIDER - NSDCMRMEDTOKEN_GEN_ALL_CORE_FT
bicalutamide 50 mg oral tablet: 1 tab(s) orally once a day  Eliquis 5 mg oral tablet: 1 tab(s) orally 2 times a day  finasteride 5 mg oral tablet: 1 tab(s) orally once a day  Protonix 40 mg oral delayed release tablet: 1 tab(s) orally once a day  simvastatin 40 mg oral tablet: 1 tab(s) orally once a day (at bedtime)  tamsulosin 0.4 mg oral capsule: 1 cap(s) orally once a day (at bedtime)

## 2024-03-20 NOTE — PROGRESS NOTE ADULT - SUBJECTIVE AND OBJECTIVE BOX
Overnight events:   - No acute events    SUBJECTIVE:  Patient was seen and examined on AM rounds. Denies new complaints.     OBJECTIVE:  Vital Signs Last 24 Hrs  T(C): 36.9 (20 Mar 2024 13:07), Max: 36.9 (20 Mar 2024 13:07)  T(F): 98.4 (20 Mar 2024 13:07), Max: 98.4 (20 Mar 2024 13:07)  HR: 71 (20 Mar 2024 13:07) (56 - 71)  BP: 143/64 (20 Mar 2024 13:07) (131/56 - 143/72)  BP(mean): --  RR: 17 (20 Mar 2024 13:07) (17 - 18)  SpO2: 95% (20 Mar 2024 13:07) (95% - 98%)    Parameters below as of 20 Mar 2024 13:07  Patient On (Oxygen Delivery Method): room air      03-19-24 @ 07:01  -  03-20-24 @ 07:00  --------------------------------------------------------  IN: 170 mL / OUT: 3000 mL / NET: -2830 mL    03-20-24 @ 07:01  -  03-20-24 @ 14:31  --------------------------------------------------------  IN: 0 mL / OUT: 700 mL / NET: -700 mL      Physical Examination:  General: No acute distress, AOx4  Respiratory: Nonlabored  Cardiovascular: RRR  Abdominal: Soft, nondistended, nontender  Extremities: Warm, no signs of hematoma or skin changes  Vascular:  - RLE: palpable DP, Full ROM, no disocoloration/edema/tenderness  - LLE: palpable DP, Full ROM, no disocoloration/edema/tenderness      LABS:                        9.0    9.55  )-----------( 232      ( 20 Mar 2024 07:30 )             26.6       03-20    145  |  111<H>  |  20  ----------------------------<  82  3.8   |  25  |  0.89    Ca    8.5      20 Mar 2024 07:30  Phos  3.3     03-20  Mg     2.00     03-20    TPro  5.6<L>  /  Alb  3.0<L>  /  TBili  0.2  /  DBili  x   /  AST  65<H>  /  ALT  77<H>  /  AlkPhos  88  03-20

## 2024-03-20 NOTE — PROGRESS NOTE ADULT - PROBLEM SELECTOR PLAN 2
Acute above knee LLE DVT. Provoked iso known metastatic prostate cancer  - Duplex showing nonocclusive thrombus within the left common femoral vein, femoral vein, and deep femoral vein. The popliteal vein is patent.  - C/w lovenox 60 BID for full AC  - Will price check eliquis  - Pt will d/c on ac despite illiacus hematoma

## 2024-03-20 NOTE — PROGRESS NOTE ADULT - PROBLEM SELECTOR PLAN 6
- Fluids:   - Electrolytes: Will replete to maintain K>4, Phos>3, and Mag>2  - Nutrition: DASH  - Activity: as tolerated   - DVT Prophylaxis: lovenox 60 BID  - Stress Ulcer/GI Prophylaxis: pantoprazole   - Disposition: JONAH  - Ethics: full code

## 2024-03-20 NOTE — DISCHARGE NOTE PROVIDER - DETAILS OF MALNUTRITION DIAGNOSIS/DIAGNOSES
This patient has been assessed with a concern for Malnutrition and was treated during this hospitalization for the following Nutrition diagnosis/diagnoses:     -  03/22/2024: Severe protein-calorie malnutrition

## 2024-03-20 NOTE — PROGRESS NOTE ADULT - ATTENDING COMMENTS
MR w possible renal ca vs infection related, liver hemangioma, and R iliacus hematoma  shwetha vascular, can cw AC, counts stable, no interventions warranted   need to await completion of abx for UTI, then repeat imaging to assess renal lesion - if still concerning for malignancy needs  eval as out-pt   otherwise can switch to po Cefpodoxime to complete total 10 day course   steroids tapered off  stable for dc pending JONAH

## 2024-03-20 NOTE — DISCHARGE NOTE PROVIDER - ATTENDING DISCHARGE PHYSICAL EXAMINATION:
VITAL SIGNS:  T(C): 36.6 (03-26-24 @ 12:11), Max: 36.8 (03-25-24 @ 22:06)  T(F): 97.8 (03-26-24 @ 12:11), Max: 98.2 (03-25-24 @ 22:06)  HR: 75 (03-26-24 @ 12:11) (75 - 88)  BP: 101/62 (03-26-24 @ 12:11) (101/62 - 108/60)  BP(mean): --  RR: 17 (03-26-24 @ 12:11) (17 - 18)  SpO2: 99% (03-26-24 @ 12:11) (97% - 99%)  Wt(kg): --    PHYSICAL EXAM:  Constitutional: resting comfortably in bed; NAD  Head: NC/AT  Eyes: PERRL, EOMI, anicteric sclera  ENT: no nasal discharge; MMM  Neck: supple; no JVD  Respiratory: CTA B/L; no W/R/R  Cardiac: +S1/S2; RRR; no M/R/G  Gastrointestinal: soft, NT/ND; no rebound or guarding; +BSx4  : browne in place draining wyatt-yellow urine  Extremities: WWP, no clubbing or cyanosis; no peripheral edema  Musculoskeletal: NROM x4; no joint swelling, tenderness or erythema  Vascular: 2+ radial, DP/PT pulses B/L  Dermatologic: skin warm, dry and intact; no rashes, wounds, or scars  Neurologic: AAOx3; CNII-XII grossly intact; no focal deficits  Psychiatric: affect and characteristics of appearance, verbalizations, behaviors are appropriate

## 2024-03-20 NOTE — DISCHARGE NOTE PROVIDER - CARE PROVIDER_API CALL
Nilo Barajas  Cardiology  [address] [facility] [address]  [place] [address]  Phone: [phone]  Fax: [phone]  Established Patient  Follow Up Time: 1 week

## 2024-03-20 NOTE — DISCHARGE NOTE PROVIDER - NSDCCPTREATMENT_GEN_ALL_CORE_FT
PRINCIPAL PROCEDURE  Procedure: MRI  Findings and Treatment: IMPRESSION:  *  Motion degraded exam.  *  Ill-defined focus of heterogeneous enhancement with associated   restricted diffusion within the lower pole of the right kidney,   corresponding to the indeterminate lesion described on the recent CT.   Differential continues to include renal neoplasm, with renal cell   carcinoma not excluded, and infection related to pyelonephritis. Suggest   follow-up imaging after UTI treatment to assess for resolution or   stability.  *  Left hepatic mass measuring 5.3 cm compatible with a hemangioma.   Additional mass in the right hepatic lobe measuring 1.7 cm that is   difficult to characterize due to motion, possibly an additional   hemangioma, and unchanged in size since 12/22/2023.  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 No main pancreatic duct dilation.  *  Heterogeneous collection measuring 3.6 x 1.4 cm within the right   iliacus muscle, favored to represent an intramuscular hematoma, with   infection not excluded. Diffuse restricted diffusion within the right   iliacus muscle could be due to reactive inflammation or myositis.  *  New small bilateral pleural effusions.< from: MR Abdomen w/ IV Cont (03.18.24 @ 18:28) >

## 2024-03-20 NOTE — DISCHARGE NOTE PROVIDER - NSDCFUADDAPPT_GEN_ALL_CORE_FT
Please follow up with your PCP about repeating imaging to evaluate for the lesion in your right kidney and the stability of your right thigh hematoma.

## 2024-03-20 NOTE — PROGRESS NOTE ADULT - SUBJECTIVE AND OBJECTIVE BOX
PROGRESS NOTE:   Authored by Dr. Fernando Mills MD (PGY-1). Pager [facility] [phone] / [facility]     Patient is a 83y old  Male who presents with a chief complaint of chills       SUBJECTIVE / OVERNIGHT EVENTS:  No acute events overnight.     ADDITIONAL REVIEW OF SYSTEMS:  Patient denies fevers, chills, chest pain, shortness of breath, nausea, abdominal pain, diarrhea, constipation, dysuria, leg swelling, headache, light headedness.    MEDICATIONS  (STANDING):  apixaban 10 milliGRAM(s) Oral every 12 hours  aspirin enteric coated 81 milliGRAM(s) Oral daily  cefTRIAXone   IVPB 1000 milliGRAM(s) IV Intermittent every 24 hours  finasteride 5 milliGRAM(s) Oral daily  lactated ringers. 1000 milliLiter(s) (75 mL/Hr) IV Continuous <Continuous>  oxybutynin 5 milliGRAM(s) Oral three times a day  pantoprazole    Tablet 40 milliGRAM(s) Oral before breakfast  potassium chloride    Tablet ER 40 milliEquivalent(s) Oral every 4 hours  simvastatin 40 milliGRAM(s) Oral at bedtime  tamsulosin 0.4 milliGRAM(s) Oral at bedtime    MEDICATIONS  (PRN):  acetaminophen     Tablet .. 650 milliGRAM(s) Oral every 6 hours PRN Temp greater or equal to 38C (100.4F), Mild Pain (1 - 3)  aluminum hydroxide/magnesium hydroxide/simethicone Suspension 30 milliLiter(s) Oral every 4 hours PRN Dyspepsia  melatonin 3 milliGRAM(s) Oral at bedtime PRN Insomnia  ondansetron Injectable 4 milliGRAM(s) IV Push every 8 hours PRN Nausea and/or Vomiting      CAPILLARY BLOOD GLUCOSE      PHYSICAL EXAM:  Vital Signs Last 24 Hrs  T(C): 36.8 (19 Mar 2024 06:25), Max: 36.8 (18 Mar 2024 22:17)  T(F): 98.3 (19 Mar 2024 06:25), Max: 98.3 (19 Mar 2024 06:25)  HR: 60 (19 Mar 2024 06:25) (60 - 67)  BP: 124/55 (19 Mar 2024 06:25) (124/55 - 135/70)  BP(mean): --  RR: 18 (19 Mar 2024 06:25) (17 - 18)  SpO2: 100% (19 Mar 2024 06:25) (96% - 100%)    Parameters below as of 19 Mar 2024 06:25  Patient On (Oxygen Delivery Method): room air        CONSTITUTIONAL: NAD, well-developed  RESPIRATORY: Normal respiratory effort; lungs are clear to auscultation bilaterally  CARDIOVASCULAR: Regular rate and rhythm, normal S1 and S2, no murmur/rub/gallop; No lower extremity edema; Peripheral pulses are 2+ bilaterally  ABDOMEN: Nontender to palpation, normoactive bowel sounds, no rebound/guarding; No hepatosplenomegaly  MUSCLOSKELETAL: no clubbing or cyanosis of digits; no joint swelling or tenderness to palpation  PSYCH: A+O to person, place, and time; affect appropriate    LABS:                        9.0    9.55  )-----------( 232      ( 20 Mar 2024 07:30 )             26.6     20 Mar 2024 07:30    145    |  111    |  20     ----------------------------<  82     3.8     |  25     |  0.89     Ca    8.5        20 Mar 2024 07:30  Phos  3.3       20 Mar 2024 07:30  Mg     2.00      20 Mar 2024 07:30    TPro  5.6    /  Alb  3.0    /  TBili  0.2    /  DBili  x      /  AST  65     /  ALT  77     /  AlkPhos  88     20 Mar 2024 07:30      Urinalysis Basic - ( 19 Mar 2024 06:35 )    Color: x / Appearance: x / SG: x / pH: x  Gluc: 129 mg/dL / Ketone: x  / Bili: x / Urobili: x   Blood: x / Protein: x / Nitrite: x   Leuk Esterase: x / RBC: x / WBC x   Sq Epi: x / Non Sq Epi: x / Bacteria: x      Tele Reviewed:    RADIOLOGY & ADDITIONAL TESTS:  Results Reviewed:   Imaging Personally Reviewed:  Electrocardiogram Personally Reviewed:

## 2024-03-20 NOTE — PROGRESS NOTE ADULT - PROBLEM SELECTOR PLAN 1
-pt has chronic browne since dx of prostate cancer, last urine culture grew pseudomonas, s/p abx steroid, and fluid in the ED, likely UTI/pyelonephritis   -TTE 2023 EF 71%   - UCx showing E coli, pending sensitivities   - Zosyn (dced 3/17),  deescalate based on final urine c+s-  - started meropenem (3/17-) as WBC trending up and BPs softer  - Now on CTX to complete 10d course --> Cefpodoxime on d/c  - started mIVF  - solu-cortef 50mg taper: Q8 3/17, BID today 3/18, QD tomorrow 3/19; STOPPED  -US kidney w/ R renal pole lesion, indeterminate MRI results  -browne catheter change   -monitor VS

## 2024-03-21 LAB
HCT VFR BLD CALC: 28.6 % — LOW (ref 39–50)
HGB BLD-MCNC: 9.5 G/DL — LOW (ref 13–17)
MCHC RBC-ENTMCNC: 30.1 PG — SIGNIFICANT CHANGE UP (ref 27–34)
MCHC RBC-ENTMCNC: 33.2 GM/DL — SIGNIFICANT CHANGE UP (ref 32–36)
MCV RBC AUTO: 90.5 FL — SIGNIFICANT CHANGE UP (ref 80–100)
NRBC # BLD: 0 /100 WBCS — SIGNIFICANT CHANGE UP (ref 0–0)
NRBC # FLD: 0 K/UL — SIGNIFICANT CHANGE UP (ref 0–0)
PLATELET # BLD AUTO: 275 K/UL — SIGNIFICANT CHANGE UP (ref 150–400)
RBC # BLD: 3.16 M/UL — LOW (ref 4.2–5.8)
RBC # FLD: 13.9 % — SIGNIFICANT CHANGE UP (ref 10.3–14.5)
WBC # BLD: 7.78 K/UL — SIGNIFICANT CHANGE UP (ref 3.8–10.5)
WBC # FLD AUTO: 7.78 K/UL — SIGNIFICANT CHANGE UP (ref 3.8–10.5)

## 2024-03-21 PROCEDURE — 99232 SBSQ HOSP IP/OBS MODERATE 35: CPT | Mod: GC

## 2024-03-21 RX ADMIN — Medication 5 MILLIGRAM(S): at 14:26

## 2024-03-21 RX ADMIN — CEFTRIAXONE 100 MILLIGRAM(S): 500 INJECTION, POWDER, FOR SOLUTION INTRAMUSCULAR; INTRAVENOUS at 06:06

## 2024-03-21 RX ADMIN — TAMSULOSIN HYDROCHLORIDE 0.4 MILLIGRAM(S): 0.4 CAPSULE ORAL at 20:56

## 2024-03-21 RX ADMIN — PANTOPRAZOLE SODIUM 40 MILLIGRAM(S): 20 TABLET, DELAYED RELEASE ORAL at 06:06

## 2024-03-21 RX ADMIN — APIXABAN 10 MILLIGRAM(S): 2.5 TABLET, FILM COATED ORAL at 17:54

## 2024-03-21 RX ADMIN — Medication 5 MILLIGRAM(S): at 05:18

## 2024-03-21 RX ADMIN — FINASTERIDE 5 MILLIGRAM(S): 5 TABLET, FILM COATED ORAL at 14:26

## 2024-03-21 RX ADMIN — SIMVASTATIN 40 MILLIGRAM(S): 20 TABLET, FILM COATED ORAL at 20:56

## 2024-03-21 RX ADMIN — APIXABAN 10 MILLIGRAM(S): 2.5 TABLET, FILM COATED ORAL at 05:17

## 2024-03-21 RX ADMIN — Medication 5 MILLIGRAM(S): at 22:24

## 2024-03-21 RX ADMIN — Medication 81 MILLIGRAM(S): at 14:26

## 2024-03-21 NOTE — PROGRESS NOTE ADULT - SUBJECTIVE AND OBJECTIVE BOX
PROGRESS NOTE:   Authored by Dr. Fernando Mills MD (PGY-1). Pager [facility] [phone] / [facility]     Patient is a 83y old  Male who presents with a chief complaint of chills     SUBJECTIVE / OVERNIGHT EVENTS:  No acute events overnight.     ADDITIONAL REVIEW OF SYSTEMS:  Patient denies fevers, chills, chest pain, shortness of breath, nausea, abdominal pain, diarrhea, constipation, dysuria, leg swelling, headache, light headedness.    MEDICATIONS  (STANDING):  apixaban 10 milliGRAM(s) Oral every 12 hours  aspirin enteric coated 81 milliGRAM(s) Oral daily  cefTRIAXone   IVPB 1000 milliGRAM(s) IV Intermittent every 24 hours  finasteride 5 milliGRAM(s) Oral daily  lactated ringers. 1000 milliLiter(s) (75 mL/Hr) IV Continuous <Continuous>  oxybutynin 5 milliGRAM(s) Oral three times a day  pantoprazole    Tablet 40 milliGRAM(s) Oral before breakfast  potassium chloride    Tablet ER 40 milliEquivalent(s) Oral every 4 hours  simvastatin 40 milliGRAM(s) Oral at bedtime  tamsulosin 0.4 milliGRAM(s) Oral at bedtime    MEDICATIONS  (PRN):  acetaminophen     Tablet .. 650 milliGRAM(s) Oral every 6 hours PRN Temp greater or equal to 38C (100.4F), Mild Pain (1 - 3)  aluminum hydroxide/magnesium hydroxide/simethicone Suspension 30 milliLiter(s) Oral every 4 hours PRN Dyspepsia  melatonin 3 milliGRAM(s) Oral at bedtime PRN Insomnia  ondansetron Injectable 4 milliGRAM(s) IV Push every 8 hours PRN Nausea and/or Vomiting      CAPILLARY BLOOD GLUCOSE      PHYSICAL EXAM:  Vital Signs Last 24 Hrs  T(C): 36.3 (21 Mar 2024 05:25), Max: 36.9 (20 Mar 2024 13:07)  T(F): 97.3 (21 Mar 2024 05:25), Max: 98.4 (20 Mar 2024 13:07)  HR: 88 (21 Mar 2024 05:25) (63 - 88)  BP: 115/73 (21 Mar 2024 05:25) (115/73 - 149/61)  BP(mean): --  RR: 16 (21 Mar 2024 05:25) (16 - 17)  SpO2: 100% (21 Mar 2024 05:25) (95% - 100%)    Parameters below as of 21 Mar 2024 05:25  Patient On (Oxygen Delivery Method): room air      CONSTITUTIONAL: NAD, well-developed  RESPIRATORY: Normal respiratory effort; lungs are clear to auscultation bilaterally  CARDIOVASCULAR: Regular rate and rhythm, normal S1 and S2, no murmur/rub/gallop; No lower extremity edema; Peripheral pulses are 2+ bilaterally  ABDOMEN: Nontender to palpation, normoactive bowel sounds, no rebound/guarding; No hepatosplenomegaly  MUSCLOSKELETAL: no clubbing or cyanosis of digits; no joint swelling or tenderness to palpation  PSYCH: A+O to person, place, and time; affect appropriate    LABS:                        9.5    7.78  )-----------( 275      ( 21 Mar 2024 06:25 )             28.6       Ca    8.5        20 Mar 2024 07:30          Urinalysis Basic - ( 19 Mar 2024 06:35 )    Color: x / Appearance: x / SG: x / pH: x  Gluc: 129 mg/dL / Ketone: x  / Bili: x / Urobili: x   Blood: x / Protein: x / Nitrite: x   Leuk Esterase: x / RBC: x / WBC x   Sq Epi: x / Non Sq Epi: x / Bacteria: x      Tele Reviewed:    RADIOLOGY & ADDITIONAL TESTS:  Results Reviewed:   Imaging Personally Reviewed:  Electrocardiogram Personally Reviewed:

## 2024-03-21 NOTE — PROGRESS NOTE ADULT - SUBJECTIVE AND OBJECTIVE BOX
Overnight events:   - No acute events    SUBJECTIVE:  Patient was seen and examined on AM rounds. Denies new complaints. Discussed ongoing AC recs with patient.    OBJECTIVE:  Vital Signs Last 24 Hrs  T(C): 36.3 (21 Mar 2024 05:25), Max: 36.9 (20 Mar 2024 13:07)  T(F): 97.3 (21 Mar 2024 05:25), Max: 98.4 (20 Mar 2024 13:07)  HR: 88 (21 Mar 2024 05:25) (63 - 88)  BP: 115/73 (21 Mar 2024 05:25) (115/73 - 149/61)  BP(mean): --  RR: 16 (21 Mar 2024 05:25) (16 - 17)  SpO2: 100% (21 Mar 2024 05:25) (95% - 100%)    Parameters below as of 21 Mar 2024 05:25  Patient On (Oxygen Delivery Method): room air      03-20-24 @ 07:01  -  03-21-24 @ 07:00  --------------------------------------------------------  IN: 634 mL / OUT: 3300 mL / NET: -2666 mL      Physical Examination:  General: No acute distress, AOx4  Respiratory: Nonlabored  Cardiovascular: RRR  Abdominal: Soft, nondistended, nontender  Extremities: Warm, no signs of hematoma or skin changes  Vascular:  - RLE: palpable DP, Full ROM, no disocoloration/edema/tenderness  - LLE: palpable DP, Full ROM, no disocoloration/edema/tenderness      LABS:                        9.5    7.78  )-----------( 275      ( 21 Mar 2024 06:25 )             28.6       03-20    145  |  111<H>  |  20  ----------------------------<  82  3.8   |  25  |  0.89    Ca    8.5      20 Mar 2024 07:30  Phos  3.3     03-20  Mg     2.00     03-20    TPro  5.6<L>  /  Alb  3.0<L>  /  TBili  0.2  /  DBili  x   /  AST  65<H>  /  ALT  77<H>  /  AlkPhos  88  03-20

## 2024-03-22 DIAGNOSIS — N28.9 DISORDER OF KIDNEY AND URETER, UNSPECIFIED: ICD-10-CM

## 2024-03-22 LAB
ALBUMIN SERPL ELPH-MCNC: 3 G/DL — LOW (ref 3.3–5)
ALP SERPL-CCNC: 93 U/L — SIGNIFICANT CHANGE UP (ref 40–120)
ALT FLD-CCNC: 50 U/L — HIGH (ref 4–41)
ANION GAP SERPL CALC-SCNC: 8 MMOL/L — SIGNIFICANT CHANGE UP (ref 7–14)
AST SERPL-CCNC: 31 U/L — SIGNIFICANT CHANGE UP (ref 4–40)
BASOPHILS # BLD AUTO: 0.02 K/UL — SIGNIFICANT CHANGE UP (ref 0–0.2)
BASOPHILS NFR BLD AUTO: 0.3 % — SIGNIFICANT CHANGE UP (ref 0–2)
BILIRUB SERPL-MCNC: 0.2 MG/DL — SIGNIFICANT CHANGE UP (ref 0.2–1.2)
BUN SERPL-MCNC: 17 MG/DL — SIGNIFICANT CHANGE UP (ref 7–23)
CALCIUM SERPL-MCNC: 8.5 MG/DL — SIGNIFICANT CHANGE UP (ref 8.4–10.5)
CHLORIDE SERPL-SCNC: 107 MMOL/L — SIGNIFICANT CHANGE UP (ref 98–107)
CO2 SERPL-SCNC: 25 MMOL/L — SIGNIFICANT CHANGE UP (ref 22–31)
CREAT SERPL-MCNC: 0.89 MG/DL — SIGNIFICANT CHANGE UP (ref 0.5–1.3)
EGFR: 84 ML/MIN/1.73M2 — SIGNIFICANT CHANGE UP
EOSINOPHIL # BLD AUTO: 0.42 K/UL — SIGNIFICANT CHANGE UP (ref 0–0.5)
EOSINOPHIL NFR BLD AUTO: 5.9 % — SIGNIFICANT CHANGE UP (ref 0–6)
GLUCOSE SERPL-MCNC: 103 MG/DL — HIGH (ref 70–99)
HCT VFR BLD CALC: 27.7 % — LOW (ref 39–50)
HGB BLD-MCNC: 9.4 G/DL — LOW (ref 13–17)
IANC: 4.42 K/UL — SIGNIFICANT CHANGE UP (ref 1.8–7.4)
IMM GRANULOCYTES NFR BLD AUTO: 1.7 % — HIGH (ref 0–0.9)
LYMPHOCYTES # BLD AUTO: 1.44 K/UL — SIGNIFICANT CHANGE UP (ref 1–3.3)
LYMPHOCYTES # BLD AUTO: 20.3 % — SIGNIFICANT CHANGE UP (ref 13–44)
MAGNESIUM SERPL-MCNC: 1.9 MG/DL — SIGNIFICANT CHANGE UP (ref 1.6–2.6)
MCHC RBC-ENTMCNC: 30.8 PG — SIGNIFICANT CHANGE UP (ref 27–34)
MCHC RBC-ENTMCNC: 33.9 GM/DL — SIGNIFICANT CHANGE UP (ref 32–36)
MCV RBC AUTO: 90.8 FL — SIGNIFICANT CHANGE UP (ref 80–100)
MONOCYTES # BLD AUTO: 0.67 K/UL — SIGNIFICANT CHANGE UP (ref 0–0.9)
MONOCYTES NFR BLD AUTO: 9.4 % — SIGNIFICANT CHANGE UP (ref 2–14)
NEUTROPHILS # BLD AUTO: 4.42 K/UL — SIGNIFICANT CHANGE UP (ref 1.8–7.4)
NEUTROPHILS NFR BLD AUTO: 62.4 % — SIGNIFICANT CHANGE UP (ref 43–77)
NRBC # BLD: 0 /100 WBCS — SIGNIFICANT CHANGE UP (ref 0–0)
NRBC # FLD: 0 K/UL — SIGNIFICANT CHANGE UP (ref 0–0)
PHOSPHATE SERPL-MCNC: 3.9 MG/DL — SIGNIFICANT CHANGE UP (ref 2.5–4.5)
PLATELET # BLD AUTO: 290 K/UL — SIGNIFICANT CHANGE UP (ref 150–400)
POTASSIUM SERPL-MCNC: 3.6 MMOL/L — SIGNIFICANT CHANGE UP (ref 3.5–5.3)
POTASSIUM SERPL-SCNC: 3.6 MMOL/L — SIGNIFICANT CHANGE UP (ref 3.5–5.3)
PROT SERPL-MCNC: 5.9 G/DL — LOW (ref 6–8.3)
RBC # BLD: 3.05 M/UL — LOW (ref 4.2–5.8)
RBC # FLD: 13.6 % — SIGNIFICANT CHANGE UP (ref 10.3–14.5)
SODIUM SERPL-SCNC: 140 MMOL/L — SIGNIFICANT CHANGE UP (ref 135–145)
WBC # BLD: 7.09 K/UL — SIGNIFICANT CHANGE UP (ref 3.8–10.5)
WBC # FLD AUTO: 7.09 K/UL — SIGNIFICANT CHANGE UP (ref 3.8–10.5)

## 2024-03-22 PROCEDURE — 99232 SBSQ HOSP IP/OBS MODERATE 35: CPT | Mod: GC

## 2024-03-22 RX ADMIN — APIXABAN 10 MILLIGRAM(S): 2.5 TABLET, FILM COATED ORAL at 17:33

## 2024-03-22 RX ADMIN — Medication 81 MILLIGRAM(S): at 11:28

## 2024-03-22 RX ADMIN — APIXABAN 10 MILLIGRAM(S): 2.5 TABLET, FILM COATED ORAL at 05:14

## 2024-03-22 RX ADMIN — TAMSULOSIN HYDROCHLORIDE 0.4 MILLIGRAM(S): 0.4 CAPSULE ORAL at 21:33

## 2024-03-22 RX ADMIN — PANTOPRAZOLE SODIUM 40 MILLIGRAM(S): 20 TABLET, DELAYED RELEASE ORAL at 05:14

## 2024-03-22 RX ADMIN — FINASTERIDE 5 MILLIGRAM(S): 5 TABLET, FILM COATED ORAL at 11:27

## 2024-03-22 RX ADMIN — CEFTRIAXONE 100 MILLIGRAM(S): 500 INJECTION, POWDER, FOR SOLUTION INTRAMUSCULAR; INTRAVENOUS at 05:14

## 2024-03-22 RX ADMIN — Medication 5 MILLIGRAM(S): at 05:14

## 2024-03-22 RX ADMIN — SIMVASTATIN 40 MILLIGRAM(S): 20 TABLET, FILM COATED ORAL at 21:33

## 2024-03-22 RX ADMIN — Medication 5 MILLIGRAM(S): at 17:31

## 2024-03-22 RX ADMIN — Medication 5 MILLIGRAM(S): at 21:33

## 2024-03-22 NOTE — PROGRESS NOTE ADULT - PROBLEM SELECTOR PLAN 7
- Fluids:   - Electrolytes: Will replete to maintain K>4, Phos>3, and Mag>2  - Nutrition: DASH  - Activity: as tolerated   - DVT Prophylaxis: lovenox 60 BID  - Stress Ulcer/GI Prophylaxis: pantoprazole   - Disposition: JONAH; f/u MRA   - Ethics: full code

## 2024-03-22 NOTE — DIETITIAN INITIAL EVALUATION ADULT - SIGNS/SYMPTOMS
physical findings above, weight loss > 7.5% in 3 month physical findings above, weight loss > 7.5% in 3 month, PO meeting </=75% ENN >/=1 month.

## 2024-03-22 NOTE — PROGRESS NOTE ADULT - SUBJECTIVE AND OBJECTIVE BOX
PROGRESS NOTE:   Authored by Dr. Fernando Mills MD (PGY-1). Pager [facility] [phone] / [facility]     Patient is a 83y old  Male who presents with a chief complaint of chills     SUBJECTIVE / OVERNIGHT EVENTS:  No acute events overnight.     ADDITIONAL REVIEW OF SYSTEMS:  Patient denies fevers, chills, chest pain, shortness of breath, nausea, abdominal pain, diarrhea, constipation, dysuria, leg swelling, headache, light headedness.    MEDICATIONS  (STANDING):  apixaban 10 milliGRAM(s) Oral every 12 hours  aspirin enteric coated 81 milliGRAM(s) Oral daily  cefTRIAXone   IVPB 1000 milliGRAM(s) IV Intermittent every 24 hours  finasteride 5 milliGRAM(s) Oral daily  lactated ringers. 1000 milliLiter(s) (75 mL/Hr) IV Continuous <Continuous>  oxybutynin 5 milliGRAM(s) Oral three times a day  pantoprazole    Tablet 40 milliGRAM(s) Oral before breakfast  potassium chloride    Tablet ER 40 milliEquivalent(s) Oral every 4 hours  simvastatin 40 milliGRAM(s) Oral at bedtime  tamsulosin 0.4 milliGRAM(s) Oral at bedtime    MEDICATIONS  (PRN):  acetaminophen     Tablet .. 650 milliGRAM(s) Oral every 6 hours PRN Temp greater or equal to 38C (100.4F), Mild Pain (1 - 3)  aluminum hydroxide/magnesium hydroxide/simethicone Suspension 30 milliLiter(s) Oral every 4 hours PRN Dyspepsia  melatonin 3 milliGRAM(s) Oral at bedtime PRN Insomnia  ondansetron Injectable 4 milliGRAM(s) IV Push every 8 hours PRN Nausea and/or Vomiting      CAPILLARY BLOOD GLUCOSE      PHYSICAL EXAM:  Vital Signs Last 24 Hrs  T(C): 36.3 (21 Mar 2024 05:25), Max: 36.9 (20 Mar 2024 13:07)  T(F): 97.3 (21 Mar 2024 05:25), Max: 98.4 (20 Mar 2024 13:07)  HR: 88 (21 Mar 2024 05:25) (63 - 88)  BP: 115/73 (21 Mar 2024 05:25) (115/73 - 149/61)  BP(mean): --  RR: 16 (21 Mar 2024 05:25) (16 - 17)  SpO2: 100% (21 Mar 2024 05:25) (95% - 100%)    Parameters below as of 21 Mar 2024 05:25  Patient On (Oxygen Delivery Method): room air      CONSTITUTIONAL: NAD, well-developed  RESPIRATORY: Normal respiratory effort; lungs are clear to auscultation bilaterally  CARDIOVASCULAR: Regular rate and rhythm, normal S1 and S2, no murmur/rub/gallop; No lower extremity edema; Peripheral pulses are 2+ bilaterally  ABDOMEN: Nontender to palpation, normoactive bowel sounds, no rebound/guarding; No hepatosplenomegaly  MUSCLOSKELETAL: no clubbing or cyanosis of digits; no joint swelling or tenderness to palpation  PSYCH: A+O to person, place, and time; affect appropriate    LABS:                        9.5    7.78  )-----------( 275      ( 21 Mar 2024 06:25 )             28.6       Ca    8.5        20 Mar 2024 07:30          Urinalysis Basic - ( 19 Mar 2024 06:35 )    Color: x / Appearance: x / SG: x / pH: x  Gluc: 129 mg/dL / Ketone: x  / Bili: x / Urobili: x   Blood: x / Protein: x / Nitrite: x   Leuk Esterase: x / RBC: x / WBC x   Sq Epi: x / Non Sq Epi: x / Bacteria: x      Tele Reviewed:    RADIOLOGY & ADDITIONAL TESTS:  Results Reviewed:   Imaging Personally Reviewed:  Electrocardiogram Personally Reviewed:

## 2024-03-22 NOTE — DIETITIAN INITIAL EVALUATION ADULT - PERTINENT MEDS FT
MEDICATIONS  (STANDING):  apixaban 10 milliGRAM(s) Oral every 12 hours  aspirin enteric coated 81 milliGRAM(s) Oral daily  cefTRIAXone   IVPB 1000 milliGRAM(s) IV Intermittent every 24 hours  finasteride 5 milliGRAM(s) Oral daily  lactated ringers. 1000 milliLiter(s) (75 mL/Hr) IV Continuous <Continuous>  oxybutynin 5 milliGRAM(s) Oral three times a day  pantoprazole    Tablet 40 milliGRAM(s) Oral before breakfast  simvastatin 40 milliGRAM(s) Oral at bedtime  tamsulosin 0.4 milliGRAM(s) Oral at bedtime    MEDICATIONS  (PRN):  acetaminophen     Tablet .. 650 milliGRAM(s) Oral every 6 hours PRN Temp greater or equal to 38C (100.4F), Mild Pain (1 - 3)  aluminum hydroxide/magnesium hydroxide/simethicone Suspension 30 milliLiter(s) Oral every 4 hours PRN Dyspepsia  melatonin 3 milliGRAM(s) Oral at bedtime PRN Insomnia  ondansetron Injectable 4 milliGRAM(s) IV Push every 8 hours PRN Nausea and/or Vomiting

## 2024-03-22 NOTE — DIETITIAN INITIAL EVALUATION ADULT - EDUCATION DIETARY MODIFICATIONS
General healthy eating pattern, focus on lean protein consumption./(2) meets goals/outcomes/verbalization

## 2024-03-22 NOTE — DIETITIAN INITIAL EVALUATION ADULT - OTHER INFO
Patient is A&O x2 with [place] at baseline.  Patient is receiving a DASH/TLC diet order in house.  Pt reports adequate PO intake with good appetite.  Noted variable PO intake per RN flowsheet.   Estimated PO intake is </= 75% ENN >/= 1 months.  No specific food and fluid preferences discussed.  Denies difficulty swallowing with some difficulty chewing.  No active GI distress such as nausea, vomit, diarrhea, constipation; on ondansetron PRN.  Pt noted last BM on 3/21 with fecal incontinence per RN flowsheets.  Skin noted intact without edema, no pressure injury per RN flowsheets.  Noted sacral/gluteal/perineum erythema per RN flowsheet.  Dosing weight@57.5kg (3-16-24), height 170.2cm, BMI 19.9-borderline underweight-normal weight status.  Labs 3/22 noticeable for H/H 9.4/27.7.  Recommend diet liberalization, add Ensure Plus High Protein 1 x/day (vanilla flavor) 2/2 sub-optimal intake.  RD to remain available.

## 2024-03-22 NOTE — PROGRESS NOTE ADULT - PROBLEM SELECTOR PLAN 3
outpatient mri prostate, biopsy + path reports, urology documentation reviewed known metastasis to bones, and rectal area.   currently on Hormone based chemotherapy including zytiga, casodex, eligard inj + prednisone for treatment;     -last Eligard injection 03/15/2024  -hold  zytiga, casodex, and prednisone iso acute infection  -cont home tamsulosin + finasteride  -Pain control  -outpatient urology follow up  for further mgmt US kidney w/ R renal pole lesion, indeterminate MRI results (Pyelo vs RCC)  - f/u MRA outpatient

## 2024-03-22 NOTE — PROGRESS NOTE ADULT - PROBLEM SELECTOR PLAN 1
-pt has chronic browne since dx of prostate cancer, last urine culture grew pseudomonas, s/p abx steroid, and fluid in the ED, likely UTI/pyelonephritis   -TTE 2023 EF 71%   - UCx showing E coli, pending sensitivities   - Zosyn (dced 3/17),  deescalate based on final urine c+s-  - started meropenem (3/17-) as WBC trending up and BPs softer  - Now on CTX to complete 10d course --> Cefpodoxime on d/c  - started mIVF  - solu-cortef 50mg taper: Q8 3/17, BID today 3/18, QD tomorrow 3/19; STOPPED  -US kidney w/ R renal pole lesion, indeterminate MRI results  -browne catheter change   -monitor VS -pt has chronic browne since dx of prostate cancer, last urine culture grew pseudomonas, s/p abx steroid, and fluid in the ED, likely UTI/pyelonephritis   -TTE 2023 EF 71%   - UCx showing E coli, pending sensitivities   - Zosyn (dced 3/17),  deescalate based on final urine c+s-  - started meropenem (3/17-) as WBC trending up and BPs softer  - Now on CTX to complete 10d course --> Cefpodoxime on d/c  - started mIVF  - solu-cortef 50mg taper: Q8 3/17, BID today 3/18, QD tomorrow 3/19; STOPPED  - US kidney w/ R renal pole lesion, indeterminate MRI results; OP MRA   - browne catheter change   - monitor VS

## 2024-03-22 NOTE — DIETITIAN INITIAL EVALUATION ADULT - ORAL INTAKE PTA/DIET HISTORY
Patient is [place], reports some chewing difficulty due to missing lower bridge.  Patient reports taking Ensure Plus High Protein 1 bottle/day (350kcal, 20gm protein) once daily PTA.  States having a 30-40 lbs weight loss for unknown period of time.     Noted a -8.3kg/18.3 lbs/12.6% weight loss in [facility]: 65.8kg (12-23-23) 66.2kg (2-24-23)

## 2024-03-22 NOTE — PROGRESS NOTE ADULT - PROBLEM SELECTOR PLAN 6
- Fluids:   - Electrolytes: Will replete to maintain K>4, Phos>3, and Mag>2  - Nutrition: DASH  - Activity: as tolerated   - DVT Prophylaxis: lovenox 60 BID  - Stress Ulcer/GI Prophylaxis: pantoprazole   - Disposition: JONAH  - Ethics: full code -c/w pantoprazole

## 2024-03-22 NOTE — DIETITIAN INITIAL EVALUATION ADULT - ADD RECOMMEND
1. Please consider to provide Ensure Plus High Protein 1 x/day which pt takes it PTA.  2. Consider to discontinue DASH/TLC, provide regular diet due to sub-optimal intake.  3. Nursing to document PO in RN flow sheets and monitor weekly weights.   4. Continue to monitor skin integrity, bowel regimen, and nutrition pertinent labs.

## 2024-03-22 NOTE — DIETITIAN INITIAL EVALUATION ADULT - PERTINENT LABORATORY DATA
03-22    140  |  107  |  17  ----------------------------<  103<H>  3.6   |  25  |  0.89    Ca    8.5      22 Mar 2024 06:05  Phos  3.9     03-22  Mg     1.90     03-22    TPro  5.9<L>  /  Alb  3.0<L>  /  TBili  0.2  /  DBili  x   /  AST  31  /  ALT  50<H>  /  AlkPhos  93  03-22  A1C with Estimated Average Glucose Result: 4.9 % (03-16-24 @ 09:50)  A1C with Estimated Average Glucose Result: 5.4 % (12-25-23 @ 07:20)

## 2024-03-22 NOTE — DIETITIAN INITIAL EVALUATION ADULT - ETIOLOGY
physiological cause (metastatic prostate cancer to bones/lung and local invasion to rectum on active chemo treatment)

## 2024-03-22 NOTE — DIETITIAN INITIAL EVALUATION ADULT - REASON FOR ADMISSION
Fever due to unspecified condition.  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 Pedro' office with fever and chills, febrile and hypotensive,  UA positive, admitted for sepsis

## 2024-03-23 LAB
HCT VFR BLD CALC: 29.2 % — LOW (ref 39–50)
HGB BLD-MCNC: 9.9 G/DL — LOW (ref 13–17)
MCHC RBC-ENTMCNC: 30.5 PG — SIGNIFICANT CHANGE UP (ref 27–34)
MCHC RBC-ENTMCNC: 33.9 GM/DL — SIGNIFICANT CHANGE UP (ref 32–36)
MCV RBC AUTO: 89.8 FL — SIGNIFICANT CHANGE UP (ref 80–100)
NRBC # BLD: 0 /100 WBCS — SIGNIFICANT CHANGE UP (ref 0–0)
NRBC # FLD: 0 K/UL — SIGNIFICANT CHANGE UP (ref 0–0)
PLATELET # BLD AUTO: 325 K/UL — SIGNIFICANT CHANGE UP (ref 150–400)
RBC # BLD: 3.25 M/UL — LOW (ref 4.2–5.8)
RBC # FLD: 13.8 % — SIGNIFICANT CHANGE UP (ref 10.3–14.5)
WBC # BLD: 8.15 K/UL — SIGNIFICANT CHANGE UP (ref 3.8–10.5)
WBC # FLD AUTO: 8.15 K/UL — SIGNIFICANT CHANGE UP (ref 3.8–10.5)

## 2024-03-23 PROCEDURE — 99232 SBSQ HOSP IP/OBS MODERATE 35: CPT

## 2024-03-23 RX ORDER — SODIUM CHLORIDE 9 MG/ML
500 INJECTION, SOLUTION INTRAVENOUS ONCE
Refills: 0 | Status: COMPLETED | OUTPATIENT
Start: 2024-03-23 | End: 2024-03-23

## 2024-03-23 RX ORDER — ACETAMINOPHEN 500 MG
1000 TABLET ORAL ONCE
Refills: 0 | Status: COMPLETED | OUTPATIENT
Start: 2024-03-23 | End: 2024-03-23

## 2024-03-23 RX ORDER — CYCLOBENZAPRINE HYDROCHLORIDE 10 MG/1
5 TABLET, FILM COATED ORAL ONCE
Refills: 0 | Status: COMPLETED | OUTPATIENT
Start: 2024-03-23 | End: 2024-03-23

## 2024-03-23 RX ADMIN — CYCLOBENZAPRINE HYDROCHLORIDE 5 MILLIGRAM(S): 10 TABLET, FILM COATED ORAL at 12:13

## 2024-03-23 RX ADMIN — Medication 650 MILLIGRAM(S): at 16:37

## 2024-03-23 RX ADMIN — Medication 400 MILLIGRAM(S): at 18:53

## 2024-03-23 RX ADMIN — Medication 650 MILLIGRAM(S): at 17:37

## 2024-03-23 RX ADMIN — Medication 5 MILLIGRAM(S): at 06:16

## 2024-03-23 RX ADMIN — APIXABAN 10 MILLIGRAM(S): 2.5 TABLET, FILM COATED ORAL at 06:16

## 2024-03-23 RX ADMIN — APIXABAN 10 MILLIGRAM(S): 2.5 TABLET, FILM COATED ORAL at 16:37

## 2024-03-23 RX ADMIN — SODIUM CHLORIDE 500 MILLILITER(S): 9 INJECTION, SOLUTION INTRAVENOUS at 22:39

## 2024-03-23 RX ADMIN — Medication 1000 MILLIGRAM(S): at 19:23

## 2024-03-23 RX ADMIN — SIMVASTATIN 40 MILLIGRAM(S): 20 TABLET, FILM COATED ORAL at 21:36

## 2024-03-23 RX ADMIN — FINASTERIDE 5 MILLIGRAM(S): 5 TABLET, FILM COATED ORAL at 13:33

## 2024-03-23 RX ADMIN — CEFTRIAXONE 100 MILLIGRAM(S): 500 INJECTION, POWDER, FOR SOLUTION INTRAMUSCULAR; INTRAVENOUS at 06:13

## 2024-03-23 RX ADMIN — TAMSULOSIN HYDROCHLORIDE 0.4 MILLIGRAM(S): 0.4 CAPSULE ORAL at 21:36

## 2024-03-23 RX ADMIN — Medication 5 MILLIGRAM(S): at 21:36

## 2024-03-23 RX ADMIN — Medication 81 MILLIGRAM(S): at 13:32

## 2024-03-23 RX ADMIN — PANTOPRAZOLE SODIUM 40 MILLIGRAM(S): 20 TABLET, DELAYED RELEASE ORAL at 06:16

## 2024-03-23 RX ADMIN — Medication 5 MILLIGRAM(S): at 13:32

## 2024-03-23 NOTE — PROGRESS NOTE ADULT - PROBLEM SELECTOR PLAN 2
Acute above knee LLE DVT. Provoked iso known metastatic prostate cancer  - Duplex showing nonocclusive thrombus within the left common femoral vein, femoral vein, and deep femoral vein. The popliteal vein is patent.  - C/w lovenox 60 BID for full AC --> DC on Eliquis  - Pt will d/c on ac despite illiacus hematoma

## 2024-03-23 NOTE — CHART NOTE - NSCHARTNOTEFT_GEN_A_CORE
Eliquis $47 copay. Discussed with patient's family, agreeable to price. Will start Eliquis 10mg BID load.
Urology note    Seen and evaluated pt bedside. Browne 16 Fr in place, drainage bag noted w/ 500 CC of analilia colored urine. Pt does not endorse any pain. Offers no complaints. Chart and imaging reviewed,  U/S demonstrating collapsed bladder around browne.     Keep catheter in place  Recommend Oxybutnin q8h prn for spasms   Can follow up with Dr Troy or clinic
Pt endorsing 5/10 cramping upper L thigh pain with no swelling, parasthesia, palor or erythema, low concern for new clot given presentation and pt still being on blood thinner. Provided Flexeril 5 for symptomatic support can reassess if sxs persists.

## 2024-03-23 NOTE — PROGRESS NOTE ADULT - PROBLEM SELECTOR PLAN 1
-pt has chronic browne since dx of prostate cancer, last urine culture grew pseudomonas, s/p abx steroid, and fluid in the ED, likely UTI/pyelonephritis   -TTE 2023 EF 71%   - UCx showing E coli, pending sensitivities   - Zosyn (dced 3/17),  deescalate based on final urine c+s-  - started meropenem (3/17-) as WBC trending up and BPs softer  - Now on CTX to complete 10d course --> Cefpodoxime on d/c  - started mIVF  - solu-cortef 50mg taper: Q8 3/17, BID today 3/18, QD tomorrow 3/19; STOPPED  - US kidney w/ R renal pole lesion, indeterminate MRI results; OP MRI  - browne catheter change   - monitor VS

## 2024-03-23 NOTE — PROGRESS NOTE ADULT - ATTENDING COMMENTS
Pt is an 84 yo M with PMH urinary retention (chronic browne), metastatic prostate CA (bones, lung, rectum; on chemo), HTN, HLD, CAD, SAH, GERD, and depression p/w rash and rigors. Recently admitted to [facility] for proctitis and UTI 12/2023. Here, found to have +UA with CT a/p c/w pyelonephritis on IV CTX x10d course. Course c/b adrenal suppression s/p steroids, +LE dopplers for DVT now on eliquis, and MR a/p +R iliacus hematoma for which vascular was consulted recommending no intervention, okay to continue eliquis, and plan for repeat imaging outpt. Pt seen and examined at bedside resting comfortably, lunch had just arrived however he stated he was not yet hungry. Denies complaints at present and is waiting to be discharge to rehab facility. Says he had some mild abdominal pain earlier bu the "pain medicine" helped. Discussed with HS, rest as outlined above.

## 2024-03-23 NOTE — PROVIDER CONTACT NOTE (OTHER) - ASSESSMENT
patient had cramping in left leg. no redness noted to site of pain, no warmth, no ecchymosis, no swelling noted to site.

## 2024-03-23 NOTE — PROGRESS NOTE ADULT - SUBJECTIVE AND OBJECTIVE BOX
PROGRESS NOTE:   Authored by Dr. Fernando Mills MD (PGY-1). Pager [facility] [phone] / [facility]     Patient is a 83y old  Male who presents with a chief complaint of chills     SUBJECTIVE / OVERNIGHT EVENTS:  No acute events overnight.     ADDITIONAL REVIEW OF SYSTEMS:  Patient denies fevers, chills, chest pain, shortness of breath, nausea, abdominal pain, diarrhea, constipation, dysuria, leg swelling, headache, light headedness.    MEDICATIONS  (STANDING):  apixaban 10 milliGRAM(s) Oral every 12 hours  aspirin enteric coated 81 milliGRAM(s) Oral daily  cefTRIAXone   IVPB 1000 milliGRAM(s) IV Intermittent every 24 hours  finasteride 5 milliGRAM(s) Oral daily  lactated ringers. 1000 milliLiter(s) (75 mL/Hr) IV Continuous <Continuous>  oxybutynin 5 milliGRAM(s) Oral three times a day  pantoprazole    Tablet 40 milliGRAM(s) Oral before breakfast  simvastatin 40 milliGRAM(s) Oral at bedtime  tamsulosin 0.4 milliGRAM(s) Oral at bedtime    MEDICATIONS  (PRN):  acetaminophen     Tablet .. 650 milliGRAM(s) Oral every 6 hours PRN Temp greater or equal to 38C (100.4F), Mild Pain (1 - 3)  aluminum hydroxide/magnesium hydroxide/simethicone Suspension 30 milliLiter(s) Oral every 4 hours PRN Dyspepsia  melatonin 3 milliGRAM(s) Oral at bedtime PRN Insomnia  ondansetron Injectable 4 milliGRAM(s) IV Push every 8 hours PRN Nausea and/or Vomiting      CAPILLARY BLOOD GLUCOSE      PHYSICAL EXAM:  Vital Signs Last 24 Hrs  T(C): 36.8 (23 Mar 2024 06:09), Max: 36.9 (22 Mar 2024 20:58)  T(F): 98.3 (23 Mar 2024 06:09), Max: 98.5 (22 Mar 2024 20:58)  HR: 73 (23 Mar 2024 06:09) (73 - 74)  BP: 112/60 (23 Mar 2024 06:09) (112/60 - 116/68)  BP(mean): --  RR: 16 (23 Mar 2024 06:09) (16 - 17)  SpO2: 95% (23 Mar 2024 06:09) (95% - 96%)    Parameters below as of 23 Mar 2024 06:09  Patient On (Oxygen Delivery Method): room air      CONSTITUTIONAL: NAD, well-developed  RESPIRATORY: Normal respiratory effort; lungs are clear to auscultation bilaterally  CARDIOVASCULAR: Regular rate and rhythm, normal S1 and S2, no murmur/rub/gallop; No lower extremity edema; Peripheral pulses are 2+ bilaterally  ABDOMEN: Nontender to palpation, normoactive bowel sounds, no rebound/guarding; No hepatosplenomegaly  MUSCLOSKELETAL: no clubbing or cyanosis of digits; no joint swelling or tenderness to palpation  PSYCH: A+O to person, place, and time; affect appropriate    LABS:                        9.9    8.15  )-----------( 325      ( 23 Mar 2024 06:45 )             29.2       Ca    8.5        22 Mar 2024 06:05      Urinalysis Basic - ( 19 Mar 2024 06:35 )    Color: x / Appearance: x / SG: x / pH: x  Gluc: 129 mg/dL / Ketone: x  / Bili: x / Urobili: x   Blood: x / Protein: x / Nitrite: x   Leuk Esterase: x / RBC: x / WBC x   Sq Epi: x / Non Sq Epi: x / Bacteria: x      Tele Reviewed:    RADIOLOGY & ADDITIONAL TESTS:  Results Reviewed:   Imaging Personally Reviewed:  Electrocardiogram Personally Reviewed:

## 2024-03-24 LAB
BLD GP AB SCN SERPL QL: NEGATIVE — SIGNIFICANT CHANGE UP
RH IG SCN BLD-IMP: POSITIVE — SIGNIFICANT CHANGE UP

## 2024-03-24 PROCEDURE — 99233 SBSQ HOSP IP/OBS HIGH 50: CPT

## 2024-03-24 RX ADMIN — CEFTRIAXONE 100 MILLIGRAM(S): 500 INJECTION, POWDER, FOR SOLUTION INTRAMUSCULAR; INTRAVENOUS at 06:48

## 2024-03-24 RX ADMIN — FINASTERIDE 5 MILLIGRAM(S): 5 TABLET, FILM COATED ORAL at 13:58

## 2024-03-24 RX ADMIN — Medication 81 MILLIGRAM(S): at 13:58

## 2024-03-24 RX ADMIN — Medication 5 MILLIGRAM(S): at 13:57

## 2024-03-24 RX ADMIN — Medication 5 MILLIGRAM(S): at 06:49

## 2024-03-24 RX ADMIN — Medication 5 MILLIGRAM(S): at 22:34

## 2024-03-24 RX ADMIN — TAMSULOSIN HYDROCHLORIDE 0.4 MILLIGRAM(S): 0.4 CAPSULE ORAL at 22:34

## 2024-03-24 RX ADMIN — APIXABAN 10 MILLIGRAM(S): 2.5 TABLET, FILM COATED ORAL at 06:48

## 2024-03-24 RX ADMIN — APIXABAN 10 MILLIGRAM(S): 2.5 TABLET, FILM COATED ORAL at 17:30

## 2024-03-24 RX ADMIN — SIMVASTATIN 40 MILLIGRAM(S): 20 TABLET, FILM COATED ORAL at 22:34

## 2024-03-24 RX ADMIN — PANTOPRAZOLE SODIUM 40 MILLIGRAM(S): 20 TABLET, DELAYED RELEASE ORAL at 06:49

## 2024-03-24 NOTE — PROGRESS NOTE ADULT - PROBLEM SELECTOR PLAN 1
-pt has chronic browne since dx of prostate cancer, last urine culture grew pseudomonas, s/p abx steroid, and fluid in the ED, likely UTI/pyelonephritis   -TTE 2023 EF 71%   - UCx showing E coli, pending sensitivities   - Zosyn (dced 3/17),  deescalate based on final urine c+s-  - started meropenem (3/17-) as WBC trending up and BPs softer  - Now on CTX to complete 10d course --> Cefpodoxime on d/c  - started mIVF  - solu-cortef 50mg taper: Q8 3/17, BID today 3/18, QD tomorrow 3/19; STOPPED  - US kidney w/ R renal pole lesion, indeterminate MRI results; OP MRI  - browne catheter change   - monitor VS ATTENDING NOTE: 15 y/o F presents with 1 week of L upper ABD pain radiating to the RUQ, not associated with nausea, vomiting, dysuria, fevers or chills. Pt states in [place] she received a DX of an enlarged spleen and gallbladder with a workup that sounds unrevealing. PE: Well appearing, awake and alert. Cardio – S1S2. Resp - CTAB. Abdomen – soft, NTND, No CVA tenderness. Ext- no calf swelling. Neuro – grossly unremarkable. Results d/w patient and family and copies of results provided.  Pt instructed to return if any worsening symptoms or concerns.  Discussed with patient follow up and care plan. They verbalize understanding all discussed and all questions answered.

## 2024-03-24 NOTE — PROGRESS NOTE ADULT - ATTENDING COMMENTS
84 yo M with PMH urinary retention (chronic browne), metastatic prostate CA (bones, lung, rectum; on chemo), HTN, HLD, CAD, SAH, GERD, and depression p/w rash and rigors. Recently admitted to [facility] for proctitis and UTI 12/2023. Here, found to have +UA with CT a/p c/w pyelonephritis S/p Zosyn --> CTX x10d course. Course c/b adrenal suppression s/p steroids, +LE dopplers for DVT now on eliquis, and MR a/p +R iliacus hematoma for which vascular was consulted recommending no intervention, okay to continue eliquis, and plan for repeat imaging outpt.

## 2024-03-24 NOTE — PROGRESS NOTE ADULT - SUBJECTIVE AND OBJECTIVE BOX
PROGRESS NOTE:   Authored by Dr. Fernando Mills MD (PGY-1). Pager [facility] [phone] / [facility]     Patient is a 83y old  Male who presents with a chief complaint of chills     SUBJECTIVE / OVERNIGHT EVENTS:  No acute events overnight.     ADDITIONAL REVIEW OF SYSTEMS:  Patient denies fevers, chills, chest pain, shortness of breath, nausea, abdominal pain, diarrhea, constipation, dysuria, leg swelling, headache, light headedness.    MEDICATIONS  (STANDING):  apixaban 10 milliGRAM(s) Oral every 12 hours  aspirin enteric coated 81 milliGRAM(s) Oral daily  cefTRIAXone   IVPB 1000 milliGRAM(s) IV Intermittent every 24 hours  finasteride 5 milliGRAM(s) Oral daily  lactated ringers. 1000 milliLiter(s) (75 mL/Hr) IV Continuous <Continuous>  oxybutynin 5 milliGRAM(s) Oral three times a day  pantoprazole    Tablet 40 milliGRAM(s) Oral before breakfast  simvastatin 40 milliGRAM(s) Oral at bedtime  tamsulosin 0.4 milliGRAM(s) Oral at bedtime    MEDICATIONS  (PRN):  acetaminophen     Tablet .. 650 milliGRAM(s) Oral every 6 hours PRN Temp greater or equal to 38C (100.4F), Mild Pain (1 - 3)  aluminum hydroxide/magnesium hydroxide/simethicone Suspension 30 milliLiter(s) Oral every 4 hours PRN Dyspepsia  melatonin 3 milliGRAM(s) Oral at bedtime PRN Insomnia  ondansetron Injectable 4 milliGRAM(s) IV Push every 8 hours PRN Nausea and/or Vomiting      CAPILLARY BLOOD GLUCOSE      PHYSICAL EXAM:  Vital Signs Last 24 Hrs  T(C): 36.8 (24 Mar 2024 05:53), Max: 36.8 (23 Mar 2024 21:10)  T(F): 98.3 (24 Mar 2024 05:53), Max: 98.3 (24 Mar 2024 05:53)  HR: 78 (24 Mar 2024 05:53) (72 - 88)  BP: 105/65 (24 Mar 2024 05:53) (90/52 - 134/77)  BP(mean): --  RR: 17 (24 Mar 2024 05:53) (17 - 17)  SpO2: 98% (24 Mar 2024 05:53) (96% - 98%)    Parameters below as of 24 Mar 2024 05:53  Patient On (Oxygen Delivery Method): room air      CONSTITUTIONAL: NAD, well-developed  RESPIRATORY: Normal respiratory effort; lungs are clear to auscultation bilaterally  CARDIOVASCULAR: Regular rate and rhythm, normal S1 and S2, no murmur/rub/gallop; No lower extremity edema; Peripheral pulses are 2+ bilaterally  ABDOMEN: Nontender to palpation, normoactive bowel sounds, no rebound/guarding; No hepatosplenomegaly  MUSCLOSKELETAL: no clubbing or cyanosis of digits; no joint swelling or tenderness to palpation  PSYCH: A+O to person, place, and time; affect appropriate    LABS:        Urinalysis Basic - ( 19 Mar 2024 06:35 )    Color: x / Appearance: x / SG: x / pH: x  Gluc: 129 mg/dL / Ketone: x  / Bili: x / Urobili: x   Blood: x / Protein: x / Nitrite: x   Leuk Esterase: x / RBC: x / WBC x   Sq Epi: x / Non Sq Epi: x / Bacteria: x      Tele Reviewed:    RADIOLOGY & ADDITIONAL TESTS:  Results Reviewed:   Imaging Personally Reviewed:  Electrocardiogram Personally Reviewed:

## 2024-03-25 LAB
25(OH)D3 SERPL-MCNC: 33.1 NG/ML
25(OH)D3 SERPL-MCNC: 68.5 NG/ML
ALBUMIN SERPL ELPH-MCNC: 3.8 G/DL
ALBUMIN SERPL ELPH-MCNC: 4 G/DL
ALBUMIN SERPL ELPH-MCNC: 4.7 G/DL
ALP BLD-CCNC: 209 U/L
ALP BLD-CCNC: 212 U/L
ALP BLD-CCNC: 77 U/L
ALT SERPL-CCNC: 11 U/L
ALT SERPL-CCNC: 17 U/L
ALT SERPL-CCNC: 23 U/L
AMYLASE/CREAT SERPL: 74 U/L
ANION GAP SERPL CALC-SCNC: 13 MMOL/L
ANION GAP SERPL CALC-SCNC: 14 MMOL/L
ANION GAP SERPL CALC-SCNC: 15 MMOL/L
ANION GAP SERPL CALC-SCNC: 9 MMOL/L — SIGNIFICANT CHANGE UP (ref 7–14)
APPEARANCE: ABNORMAL
AST SERPL-CCNC: 26 U/L
AST SERPL-CCNC: 30 U/L
AST SERPL-CCNC: 35 U/L
BACTERIA: ABNORMAL /HPF
BASOPHILS # BLD AUTO: 0.06 K/UL
BASOPHILS # BLD AUTO: 0.06 K/UL
BASOPHILS NFR BLD AUTO: 0.7 %
BASOPHILS NFR BLD AUTO: 1 %
BILIRUB DIRECT SERPL-MCNC: 0.1 MG/DL
BILIRUB DIRECT SERPL-MCNC: 0.2 MG/DL
BILIRUB INDIRECT SERPL-MCNC: 0.2 MG/DL
BILIRUB INDIRECT SERPL-MCNC: 0.3 MG/DL
BILIRUB SERPL-MCNC: 0.4 MG/DL
BILIRUB SERPL-MCNC: 0.4 MG/DL
BILIRUB SERPL-MCNC: 0.7 MG/DL
BILIRUBIN URINE: NEGATIVE
BLOOD URINE: ABNORMAL
BUN SERPL-MCNC: 13 MG/DL
BUN SERPL-MCNC: 15 MG/DL
BUN SERPL-MCNC: 15 MG/DL
BUN SERPL-MCNC: 19 MG/DL — SIGNIFICANT CHANGE UP (ref 7–23)
CALCIUM SERPL-MCNC: 10.1 MG/DL
CALCIUM SERPL-MCNC: 8.7 MG/DL — SIGNIFICANT CHANGE UP (ref 8.4–10.5)
CALCIUM SERPL-MCNC: 9.9 MG/DL
CALCIUM SERPL-MCNC: 9.9 MG/DL
CHLORIDE SERPL-SCNC: 102 MMOL/L
CHLORIDE SERPL-SCNC: 102 MMOL/L
CHLORIDE SERPL-SCNC: 103 MMOL/L
CHLORIDE SERPL-SCNC: 105 MMOL/L — SIGNIFICANT CHANGE UP (ref 98–107)
CHOLEST SERPL-MCNC: 161 MG/DL
CHOLEST SERPL-MCNC: 162 MG/DL
CK SERPL-CCNC: 273 U/L
CK SERPL-CCNC: 47 U/L
CO2 SERPL-SCNC: 24 MMOL/L
CO2 SERPL-SCNC: 24 MMOL/L
CO2 SERPL-SCNC: 25 MMOL/L
CO2 SERPL-SCNC: 25 MMOL/L — SIGNIFICANT CHANGE UP (ref 22–31)
COLOR: YELLOW
CREAT SERPL-MCNC: 0.97 MG/DL — SIGNIFICANT CHANGE UP (ref 0.5–1.3)
CREAT SERPL-MCNC: 1.04 MG/DL
CREAT SERPL-MCNC: 1.15 MG/DL
CREAT SERPL-MCNC: 1.16 MG/DL
EGFR: 62 ML/MIN/1.73M2
EGFR: 62 ML/MIN/1.73M2
EGFR: 76 ML/MIN/1.73M2 — SIGNIFICANT CHANGE UP
EOSINOPHIL # BLD AUTO: 0.13 K/UL
EOSINOPHIL # BLD AUTO: 0.27 K/UL
EOSINOPHIL NFR BLD AUTO: 1.5 %
EOSINOPHIL NFR BLD AUTO: 4.6 %
ESTIMATED AVERAGE GLUCOSE: 120 MG/DL
ESTIMATED AVERAGE GLUCOSE: 126 MG/DL
FERRITIN SERPL-MCNC: 504 NG/ML
FERRITIN SERPL-MCNC: 509 NG/ML
FOLATE SERPL-MCNC: 19.8 NG/ML
GLUCOSE QUALITATIVE U: NEGATIVE MG/DL
GLUCOSE SERPL-MCNC: 106 MG/DL
GLUCOSE SERPL-MCNC: 112 MG/DL
GLUCOSE SERPL-MCNC: 117 MG/DL — HIGH (ref 70–99)
GLUCOSE SERPL-MCNC: 96 MG/DL
HAPTOGLOB SERPL-MCNC: 212 MG/DL
HBA1C MFR BLD HPLC: 5.8 %
HBA1C MFR BLD HPLC: 6 %
HCT VFR BLD CALC: 27.5 % — LOW (ref 39–50)
HCT VFR BLD CALC: 36.4 %
HCT VFR BLD CALC: 38.8 %
HCT VFR BLD CALC: 42.3 %
HDLC SERPL-MCNC: 67 MG/DL
HDLC SERPL-MCNC: 87 MG/DL
HGB BLD-MCNC: 11.7 G/DL
HGB BLD-MCNC: 12.6 G/DL
HGB BLD-MCNC: 13.6 G/DL
HGB BLD-MCNC: 9.1 G/DL — LOW (ref 13–17)
IMM GRANULOCYTES NFR BLD AUTO: 0.2 %
IMM GRANULOCYTES NFR BLD AUTO: 0.3 %
IRON SERPL-MCNC: 66 UG/DL
KETONES URINE: NEGATIVE MG/DL
LDH SERPL-CCNC: 212 U/L
LDLC SERPL CALC-MCNC: 63 MG/DL
LDLC SERPL CALC-MCNC: 76 MG/DL
LDLC SERPL DIRECT ASSAY-MCNC: 75 MG/DL
LEUKOCYTE ESTERASE URINE: ABNORMAL
LPL SERPL-CCNC: 29 U/L
LYMPHOCYTES # BLD AUTO: 0.88 K/UL
LYMPHOCYTES # BLD AUTO: 1.13 K/UL
LYMPHOCYTES NFR BLD AUTO: 13.2 %
LYMPHOCYTES NFR BLD AUTO: 15 %
MAGNESIUM SERPL-MCNC: 2.1 MG/DL
MAGNESIUM SERPL-MCNC: 2.1 MG/DL — SIGNIFICANT CHANGE UP (ref 1.6–2.6)
MAN DIFF?: NORMAL
MAN DIFF?: NORMAL
MCHC RBC-ENTMCNC: 30 PG
MCHC RBC-ENTMCNC: 30.3 PG
MCHC RBC-ENTMCNC: 30.3 PG — SIGNIFICANT CHANGE UP (ref 27–34)
MCHC RBC-ENTMCNC: 30.7 PG
MCHC RBC-ENTMCNC: 32.1 GM/DL
MCHC RBC-ENTMCNC: 32.2 GM/DL
MCHC RBC-ENTMCNC: 32.5 GM/DL
MCHC RBC-ENTMCNC: 33.1 GM/DL — SIGNIFICANT CHANGE UP (ref 32–36)
MCV RBC AUTO: 91.7 FL — SIGNIFICANT CHANGE UP (ref 80–100)
MCV RBC AUTO: 93.3 FL
MCV RBC AUTO: 94.2 FL
MCV RBC AUTO: 94.4 FL
MICROSCOPIC-UA: NORMAL
MONOCYTES # BLD AUTO: 0.51 K/UL
MONOCYTES # BLD AUTO: 0.63 K/UL
MONOCYTES NFR BLD AUTO: 7.4 %
MONOCYTES NFR BLD AUTO: 8.7 %
NEUTROPHILS # BLD AUTO: 4.11 K/UL
NEUTROPHILS # BLD AUTO: 6.57 K/UL
NEUTROPHILS NFR BLD AUTO: 70.4 %
NEUTROPHILS NFR BLD AUTO: 77 %
NITRITE URINE: POSITIVE
NONHDLC SERPL-MCNC: 75 MG/DL
NONHDLC SERPL-MCNC: 94 MG/DL
NRBC # BLD: 0 /100 WBCS — SIGNIFICANT CHANGE UP (ref 0–0)
NRBC # FLD: 0 K/UL — SIGNIFICANT CHANGE UP (ref 0–0)
PH URINE: 6.5
PHOSPHATE SERPL-MCNC: 3.2 MG/DL
PHOSPHATE SERPL-MCNC: 3.2 MG/DL — SIGNIFICANT CHANGE UP (ref 2.5–4.5)
PLATELET # BLD AUTO: 234 K/UL
PLATELET # BLD AUTO: 261 K/UL
PLATELET # BLD AUTO: 328 K/UL — SIGNIFICANT CHANGE UP (ref 150–400)
PLATELET # BLD AUTO: 337 K/UL
POTASSIUM SERPL-MCNC: 3.8 MMOL/L — SIGNIFICANT CHANGE UP (ref 3.5–5.3)
POTASSIUM SERPL-SCNC: 3.8 MMOL/L — SIGNIFICANT CHANGE UP (ref 3.5–5.3)
POTASSIUM SERPL-SCNC: 4.1 MMOL/L
POTASSIUM SERPL-SCNC: 4.2 MMOL/L
POTASSIUM SERPL-SCNC: 4.2 MMOL/L
PROT SERPL-MCNC: 7.3 G/DL
PROT SERPL-MCNC: 7.7 G/DL
PROT SERPL-MCNC: 8.1 G/DL
PROTEIN URINE: 30 MG/DL
PSA SERPL-MCNC: 11.8 NG/ML
RBC # BLD: 3 M/UL — LOW (ref 4.2–5.8)
RBC # BLD: 3.9 M/UL
RBC # BLD: 4.11 M/UL
RBC # BLD: 4.49 M/UL
RBC # FLD: 13.9 %
RBC # FLD: 14.1 % — SIGNIFICANT CHANGE UP (ref 10.3–14.5)
RBC # FLD: 15.1 %
RBC # FLD: 16.6 %
RED BLOOD CELLS URINE: 6 /HPF
SODIUM SERPL-SCNC: 139 MMOL/L
SODIUM SERPL-SCNC: 139 MMOL/L — SIGNIFICANT CHANGE UP (ref 135–145)
SODIUM SERPL-SCNC: 140 MMOL/L
SODIUM SERPL-SCNC: 142 MMOL/L
SPECIFIC GRAVITY URINE: 1.01
SQUAMOUS EPITHELIAL CELLS: PRESENT
T3RU NFR SERPL: 1 TBI
T4 FREE SERPL-MCNC: 1.1 NG/DL
T4 FREE SERPL-MCNC: 1.1 NG/DL
T4 FREE SERPL-MCNC: 1.3 NG/DL
T4 SERPL-MCNC: 5.6 UG/DL
TRANSFERRIN SERPL-MCNC: 179 MG/DL
TRIGL SERPL-MCNC: 60 MG/DL
TRIGL SERPL-MCNC: 98 MG/DL
TSH SERPL-ACNC: 3.09 UIU/ML
TSH SERPL-ACNC: 4.5 UIU/ML
TSH SERPL-ACNC: 6.89 UIU/ML
URATE SERPL-MCNC: 5.6 MG/DL
UROBILINOGEN URINE: 1 MG/DL
VIT B12 SERPL-MCNC: 1510 PG/ML
WBC # BLD: 6.89 K/UL — SIGNIFICANT CHANGE UP (ref 3.8–10.5)
WBC # FLD AUTO: 5.85 K/UL
WBC # FLD AUTO: 6.89 K/UL — SIGNIFICANT CHANGE UP (ref 3.8–10.5)
WBC # FLD AUTO: 7.16 K/UL
WBC # FLD AUTO: 8.54 K/UL
WHITE BLOOD CELLS URINE: 151 /HPF

## 2024-03-25 PROCEDURE — 99232 SBSQ HOSP IP/OBS MODERATE 35: CPT

## 2024-03-25 RX ORDER — APIXABAN 2.5 MG/1
5 TABLET, FILM COATED ORAL EVERY 12 HOURS
Refills: 0 | Status: DISCONTINUED | OUTPATIENT
Start: 2024-03-25 | End: 2024-03-26

## 2024-03-25 RX ORDER — APIXABAN 2.5 MG/1
2.5 TABLET, FILM COATED ORAL EVERY 12 HOURS
Refills: 0 | Status: DISCONTINUED | OUTPATIENT
Start: 2024-03-25 | End: 2024-03-25

## 2024-03-25 RX ADMIN — Medication 81 MILLIGRAM(S): at 11:22

## 2024-03-25 RX ADMIN — PANTOPRAZOLE SODIUM 40 MILLIGRAM(S): 20 TABLET, DELAYED RELEASE ORAL at 06:47

## 2024-03-25 RX ADMIN — Medication 5 MILLIGRAM(S): at 11:22

## 2024-03-25 RX ADMIN — TAMSULOSIN HYDROCHLORIDE 0.4 MILLIGRAM(S): 0.4 CAPSULE ORAL at 22:06

## 2024-03-25 RX ADMIN — FINASTERIDE 5 MILLIGRAM(S): 5 TABLET, FILM COATED ORAL at 11:23

## 2024-03-25 RX ADMIN — SIMVASTATIN 40 MILLIGRAM(S): 20 TABLET, FILM COATED ORAL at 22:11

## 2024-03-25 RX ADMIN — Medication 5 MILLIGRAM(S): at 22:07

## 2024-03-25 RX ADMIN — Medication 5 MILLIGRAM(S): at 06:47

## 2024-03-25 RX ADMIN — APIXABAN 10 MILLIGRAM(S): 2.5 TABLET, FILM COATED ORAL at 06:47

## 2024-03-25 RX ADMIN — APIXABAN 5 MILLIGRAM(S): 2.5 TABLET, FILM COATED ORAL at 17:27

## 2024-03-25 NOTE — PROGRESS NOTE ADULT - SUBJECTIVE AND OBJECTIVE BOX
PROGRESS NOTE:   Authored by Dr. Adolfo Glass MD (PGY-1)  Patient is a 83y old  Male who presents with a chief complaint of chills (24 Mar 2024 10:49)    SUBJECTIVE / OVERNIGHT EVENTS:  No acute events overnight.     MEDICATIONS  (STANDING):  aspirin enteric coated 81 milliGRAM(s) Oral daily  finasteride 5 milliGRAM(s) Oral daily  oxybutynin 5 milliGRAM(s) Oral three times a day  pantoprazole    Tablet 40 milliGRAM(s) Oral before breakfast  simvastatin 40 milliGRAM(s) Oral at bedtime  tamsulosin 0.4 milliGRAM(s) Oral at bedtime    MEDICATIONS  (PRN):  acetaminophen     Tablet .. 650 milliGRAM(s) Oral every 6 hours PRN Temp greater or equal to 38C (100.4F), Mild Pain (1 - 3)  melatonin 3 milliGRAM(s) Oral at bedtime PRN Insomnia   CAPILLARY BLOOD GLUCOSE     I&O's Summary    24 Mar 2024 07:01  -  25 Mar 2024 07:00  --------------------------------------------------------  IN: 0 mL / OUT: 1800 mL / NET: -1800 mL     PHYSICAL EXAM:  Vital Signs Last 24 Hrs  T(C): 36.6 (25 Mar 2024 06:37), Max: 36.9 (24 Mar 2024 21:43)  T(F): 97.9 (25 Mar 2024 06:37), Max: 98.4 (24 Mar 2024 21:43)  HR: 77 (25 Mar 2024 06:37) (77 - 86)  BP: 99/58 (25 Mar 2024 06:37) (90/50 - 109/63)  BP(mean): --  RR: 17 (25 Mar 2024 06:37) (17 - 18)  SpO2: 97% (25 Mar 2024 06:37) (95% - 98%)    Parameters below as of 25 Mar 2024 06:37  Patient On (Oxygen Delivery Method): room air    CONSTITUTIONAL: NAD, well-developed  RESPIRATORY: Normal respiratory effort; lungs are clear to auscultation bilaterally  CARDIOVASCULAR: Regular rate and rhythm, normal S1 and S2, no murmur/rub/gallop; No lower extremity edema; Peripheral pulses are 2+ bilaterally  ABDOMEN: Nontender to palpation, normoactive bowel sounds, no rebound/guarding; No hepatosplenomegaly  MUSCLOSKELETAL: no clubbing or cyanosis of digits; no joint swelling or tenderness to palpation  PSYCH: A+O to person, place, and time; affect appropriate    LABS:                        9.1    6.89  )-----------( 328      ( 25 Mar 2024 06:05 )             27.5   03-25    139  |  105  |  19  ----------------------------<  117<H>  3.8   |  25  |  0.97    Ca    8.7      25 Mar 2024 06:05  Phos  3.2     03-25  Mg     2.10     03-25      Urinalysis Basic - ( 25 Mar 2024 06:05 )    Color: x / Appearance: x / SG: x / pH: x  Gluc: 117 mg/dL / Ketone: x  / Bili: x / Urobili: x   Blood: x / Protein: x / Nitrite: x   Leuk Esterase: x / RBC: x / WBC x   Sq Epi: x / Non Sq Epi: x / Bacteria: x    A1C with Estimated Average Glucose Result: 4.9 % (03-16-24 @ 09:50)  A1C with Estimated Average Glucose Result: 5.4 % (12-25-23 @ 07:20)    MICROBIOLOGY:     Culture - Blood (collected 03-15-24 @ 18:54)  Source: .Blood Blood-Peripheral  Final Report (03-20-24 @ 23:01):    No growth at 5 days    Culture - Blood (collected 03-15-24 @ 17:00)  Source: .Blood Blood-Peripheral  Final Report (03-20-24 @ 23:01):    No growth at 5 days      Culture - Urine (collected 03-15-24 @ 17:00)  Source: Clean Catch Clean Catch (Midstream)  Final Report (03-18-24 @ 12:40):    >100,000 CFU/ml Escherichia coli  Organism: Escherichia coli (03-18-24 @ 12:40)  Organism: Escherichia coli (03-18-24 @ 12:40)      Method Type: ALLAN      -  Amoxicillin/Clavulanic Acid: S <=8/4 Consider reserving for cystitis when ampicillin/sulbactam is resistant      -  Ampicillin: R >16 These ampicillin results predict results for amoxicillin      -  Ampicillin/Sulbactam: R >16/8      -  Aztreonam: S <=4      -  Cefazolin: S <=2 For uncomplicated UTI with K. pneumoniae, E. coli, or P. mirablis: ALLAN <=16 is sensitive and ALLAN >=32 is resistant. This also predicts results for oral agents cefaclor, cefdinir, cefpodoxime, cefprozil, cefuroxime axetil, cephalexin and locarbef for uncomplicated UTI. Note that some isolates may be susceptible to these agents while testing resistant to cefazolin.      -  Cefepime: S <=2      -  Cefoxitin: S <=8      -  Ceftriaxone: S <=1      -  Cefuroxime: S <=4      -  Ciprofloxacin: R >2      -  Ertapenem: S <=0.5      -  Gentamicin: S <=2      -  Imipenem: S <=1      -  Levofloxacin: R >4      -  Meropenem: S <=1      -  Nitrofurantoin: S <=32 Should not be used to treat pyelonephritis      -  Piperacillin/Tazobactam: S <=8      -  Tobramycin: S <=2      -  Trimethoprim/Sulfamethoxazole: S <=0.5/9.5     PROGRESS NOTE:   Authored by Dr. Adolfo Glass MD (PGY-1)  Patient is a 83y old  Male who presents with a chief complaint of chills (24 Mar 2024 10:49)    SUBJECTIVE / OVERNIGHT EVENTS:  No acute events overnight.     MEDICATIONS  (STANDING):  aspirin enteric coated 81 milliGRAM(s) Oral daily  finasteride 5 milliGRAM(s) Oral daily  oxybutynin 5 milliGRAM(s) Oral three times a day  pantoprazole    Tablet 40 milliGRAM(s) Oral before breakfast  simvastatin 40 milliGRAM(s) Oral at bedtime  tamsulosin 0.4 milliGRAM(s) Oral at bedtime    MEDICATIONS  (PRN):  acetaminophen     Tablet .. 650 milliGRAM(s) Oral every 6 hours PRN Temp greater or equal to 38C (100.4F), Mild Pain (1 - 3)  melatonin 3 milliGRAM(s) Oral at bedtime PRN Insomnia   CAPILLARY BLOOD GLUCOSE     I&O's Summary    24 Mar 2024 07:01  -  25 Mar 2024 07:00  --------------------------------------------------------  IN: 0 mL / OUT: 1800 mL / NET: -1800 mL     PHYSICAL EXAM:  Vital Signs Last 24 Hrs  T(C): 36.6 (25 Mar 2024 06:37), Max: 36.9 (24 Mar 2024 21:43)  T(F): 97.9 (25 Mar 2024 06:37), Max: 98.4 (24 Mar 2024 21:43)  HR: 77 (25 Mar 2024 06:37) (77 - 86)  BP: 99/58 (25 Mar 2024 06:37) (90/50 - 109/63)  BP(mean): --  RR: 17 (25 Mar 2024 06:37) (17 - 18)  SpO2: 97% (25 Mar 2024 06:37) (95% - 98%)    Parameters below as of 25 Mar 2024 06:37  Patient On (Oxygen Delivery Method): room air    CONSTITUTIONAL: NAD, well-developed  RESPIRATORY: Normal respiratory effort; lungs are clear to auscultation bilaterally  CARDIOVASCULAR: Regular rate and rhythm, normal S1 and S2, no murmur/rub/gallop; No lower extremity edema; Peripheral pulses are 2+ bilaterally  ABDOMEN: Nontender to palpation, normoactive bowel sounds, no rebound/guarding; No hepatosplenomegaly  MUSCLOSKELETAL: no clubbing or cyanosis of digits; no joint swelling or tenderness to palpation  PSYCH: A+O to person, place, and time; affect appropriate    LABS:                        9.1    6.89  )-----------( 328      ( 25 Mar 2024 06:05 )             27.5   03-25    139  |  105  |  19  ----------------------------<  117<H>  3.8   |  25  |  0.97    Ca    8.7      25 Mar 2024 06:05  Phos  3.2     03-25  Mg     2.10     03-25      Urinalysis Basic - ( 25 Mar 2024 06:05 )    Color: x / Appearance: x / SG: x / pH: x  Gluc: 117 mg/dL / Ketone: x  / Bili: x / Urobili: x   Blood: x / Protein: x / Nitrite: x   Leuk Esterase: x / RBC: x / WBC x   Sq Epi: x / Non Sq Epi: x / Bacteria: x    A1C with Estimated Average Glucose Result: 4.9 % (03-16-24 @ 09:50)  A1C with Estimated Average Glucose Result: 5.4 % (12-25-23 @ 07:20)    MICROBIOLOGY:     Culture - Blood (collected 03-15-24 @ 18:54)  Source: .Blood Blood-Peripheral  Final Report (03-20-24 @ 23:01):    No growth at 5 days    Culture - Blood (collected 03-15-24 @ 17:00)  Source: .Blood Blood-Peripheral  Final Report (03-20-24 @ 23:01):    No growth at 5 days    Culture - Urine (collected 03-15-24 @ 17:00)  Source: Clean Catch Clean Catch (Midstream)  Final Report (03-18-24 @ 12:40):    >100,000 CFU/ml Escherichia coli  Organism: Escherichia coli (03-18-24 @ 12:40)  Organism: Escherichia coli (03-18-24 @ 12:40)      Method Type: ALLAN      -  Amoxicillin/Clavulanic Acid: S <=8/4 Consider reserving for cystitis when ampicillin/sulbactam is resistant      -  Ampicillin: R >16 These ampicillin results predict results for amoxicillin      -  Ampicillin/Sulbactam: R >16/8      -  Aztreonam: S <=4      -  Cefazolin: S <=2 For uncomplicated UTI with K. pneumoniae, E. coli, or P. mirablis: ALLAN <=16 is sensitive and ALLAN >=32 is resistant. This also predicts results for oral agents cefaclor, cefdinir, cefpodoxime, cefprozil, cefuroxime axetil, cephalexin and locarbef for uncomplicated UTI. Note that some isolates may be susceptible to these agents while testing resistant to cefazolin.      -  Cefepime: S <=2      -  Cefoxitin: S <=8      -  Ceftriaxone: S <=1      -  Cefuroxime: S <=4      -  Ciprofloxacin: R >2      -  Ertapenem: S <=0.5      -  Gentamicin: S <=2      -  Imipenem: S <=1      -  Levofloxacin: R >4      -  Meropenem: S <=1      -  Nitrofurantoin: S <=32 Should not be used to treat pyelonephritis      -  Piperacillin/Tazobactam: S <=8      -  Tobramycin: S <=2      -  Trimethoprim/Sulfamethoxazole: S <=0.5/9.5

## 2024-03-25 NOTE — PROGRESS NOTE ADULT - PROBLEM SELECTOR PLAN 2
Acute above knee LLE DVT. Provoked iso known metastatic prostate cancer  - Duplex showing nonocclusive thrombus within the left common femoral vein, femoral vein, and deep femoral vein. The popliteal vein is patent.  - C/w lovenox 60 BID for full AC --> DC on Eliquis  - Pt will d/c on ac despite illiacus hematoma Acute above knee LLE DVT. Provoked iso known metastatic prostate cancer  - Duplex showing nonocclusive thrombus within the left common femoral vein, femoral vein, and deep femoral vein. The popliteal vein is patent.    Plan  - s/p Lovenox 60mg BID  - Transitioned to Eliquis 2.5mg BID (Reduced dose given Age + Weight - <60kg)  - To be discharged on AC despite illiacus hematoma

## 2024-03-25 NOTE — PROGRESS NOTE ADULT - PROBLEM SELECTOR PLAN 7
- Fluids:   - Electrolytes: Will replete to maintain K>4, Phos>3, and Mag>2  - Nutrition: DASH  - Activity: as tolerated   - DVT Prophylaxis: lovenox 60 BID  - Stress Ulcer/GI Prophylaxis: pantoprazole   - Disposition: JONAH; f/u [facility]   - Ethics: full code - Fluids: -   - Electrolytes: Will replete to maintain K>4, Phos>3, and Mag>2  - Nutrition: DASH  - Activity: as tolerated   - DVT Prophylaxis: Eliquis 2.5mg BID  - Stress Ulcer/GI Prophylaxis: pantoprazole   - Disposition: JONAH; f/u [facility]   - Ethics: full code

## 2024-03-25 NOTE — PROGRESS NOTE ADULT - PROBLEM SELECTOR PLAN 3
US kidney w/ R renal pole lesion, indeterminate MRI results (Pyelo vs RCC)  - f/u [facility] outpatient US kidney w/ R renal pole lesion, indeterminate MRI results (Pyelo vs RCC)    - f/u [facility] outpatient

## 2024-03-25 NOTE — PROGRESS NOTE ADULT - PROBLEM SELECTOR PLAN 1
-pt has chronic browne since dx of prostate cancer, last urine culture grew pseudomonas, s/p abx steroid, and fluid in the ED, likely UTI/pyelonephritis   -TTE 2023 EF 71%   - UCx showing E coli, pending sensitivities   - Zosyn (dced 3/17),  deescalate based on final urine c+s-  - started meropenem (3/17-) as WBC trending up and BPs softer  - Completed CTX 03/24 for 10-day course of abx  - started mIVF  - solu-cortef 50mg taper: Q8 3/17, BID today 3/18, QD tomorrow 3/19; STOPPED  - US kidney w/ R renal pole lesion, indeterminate MRI results; OP MRI  - browne catheter change   - monitor VS - Likely 2/2 UTI. Hx of chronic browne since dx of prostate cancer, last urine culture grew pseudomonas, s/p abx steroid, and fluid in the ED, likely UTI/pyelonephritis   -TTE 2023 EF 71%     Plan  - UCx showing E coli  - Antibiotics: Completed 10-day course. Initially on Zosyn --> Meropenem --> CTX (03/15 - 03/24)  - s/p Solu-Cortef 50mg Taper  - US kidney w/ R renal pole lesion, indeterminate MRI results; OP MRI  - monitor VS

## 2024-03-25 NOTE — PROGRESS NOTE ADULT - ATTENDING COMMENTS
Pt is an 82 yo M with PMH urinary retention (chronic browne), metastatic prostate CA (bones, lung, rectum; on chemo), HTN, HLD, CAD, SAH, GERD, and depression p/w rash and rigors. Recently admitted to [facility] for proctitis and UTI 12/2023. Here, found to have +UA with CT a/p c/w pyelonephritis on IV CTX x10d course. Course c/b adrenal suppression s/p steroids, +LE dopplers for DVT now on eliquis, and MR a/p +R iliacus hematoma for which vascular was consulted recommending no intervention, okay to continue eliquis, and plan for repeat imaging outpt. Pt seen and examined at bedside resting comfortably, denies complaints and says he is waiting to be discharge to rehab facility. Discussed with HS, rest as outlined above.

## 2024-03-25 NOTE — PROGRESS NOTE ADULT - PROBLEM SELECTOR PLAN 4
outpatient mri prostate, biopsy + path reports, urology documentation reviewed known metastasis to bones, and rectal area.   currently on Hormone based chemotherapy including zytiga, casodex, eligard inj + prednisone for treatment;     -last Eligard injection 03/15/2024  -hold  zytiga, casodex, and prednisone iso acute infection  -cont home tamsulosin + finasteride  -Pain control  -outpatient urology follow up  for further mgmt outpatient mri prostate, biopsy + path reports, urology documentation reviewed known metastasis to bones, and rectal area.   currently on Hormone based chemotherapy including zytiga, casodex, eligard inj + prednisone for treatment;     Plan  -last Eligard injection 03/15/2024  -hold  zytiga, casodex, and prednisone iso acute infection  -cont home tamsulosin + finasteride  -Pain control  -outpatient urology follow up  for further mgmt

## 2024-03-26 ENCOUNTER — TRANSCRIPTION ENCOUNTER (OUTPATIENT)
Age: 84
End: 2024-03-26

## 2024-03-26 VITALS
OXYGEN SATURATION: 99 % | TEMPERATURE: 98 F | DIASTOLIC BLOOD PRESSURE: 62 MMHG | HEART RATE: 75 BPM | SYSTOLIC BLOOD PRESSURE: 101 MMHG | RESPIRATION RATE: 17 BRPM

## 2024-03-26 DIAGNOSIS — T14.8XXA OTHER INJURY OF UNSPECIFIED BODY REGION, INITIAL ENCOUNTER: ICD-10-CM

## 2024-03-26 PROCEDURE — 99239 HOSP IP/OBS DSCHRG MGMT >30: CPT

## 2024-03-26 RX ADMIN — PANTOPRAZOLE SODIUM 40 MILLIGRAM(S): 20 TABLET, DELAYED RELEASE ORAL at 06:07

## 2024-03-26 RX ADMIN — Medication 5 MILLIGRAM(S): at 06:07

## 2024-03-26 RX ADMIN — APIXABAN 5 MILLIGRAM(S): 2.5 TABLET, FILM COATED ORAL at 06:07

## 2024-03-26 RX ADMIN — FINASTERIDE 5 MILLIGRAM(S): 5 TABLET, FILM COATED ORAL at 11:33

## 2024-03-26 RX ADMIN — Medication 81 MILLIGRAM(S): at 11:33

## 2024-03-26 NOTE — PROGRESS NOTE ADULT - PROBLEM SELECTOR PLAN 5
outpatient mri prostate, biopsy + path reports, urology documentation reviewed known metastasis to bones, and rectal area.   currently on Hormone based chemotherapy including zytiga, casodex, eligard inj + prednisone for treatment;     Plan  -last Eligard injection 03/15/2024  -hold  zytiga, casodex, and prednisone iso acute infection  -cont home tamsulosin + finasteride  -Pain control  -outpatient urology follow up  for further mgmt

## 2024-03-26 NOTE — DISCHARGE NOTE NURSING/CASE MANAGEMENT/SOCIAL WORK - PATIENT PORTAL LINK FT
You can access the FollowMyHealth Patient Portal offered by [facility] by registering at the following website: [email or web address]. By joining [facility] system’s FollowMyHealth portal, you will also be able to view your health information using other applications (apps) compatible with our system.

## 2024-03-26 NOTE — PROGRESS NOTE ADULT - NUTRITIONAL ASSESSMENT
This patient has been assessed with a concern for Malnutrition and has been determined to have a diagnosis/diagnoses of Severe protein-calorie malnutrition.    This patient is being managed with:   Diet Regular-  Supplement Feeding Modality:  Oral  Ensure Plus High Protein Cans or Servings Per Day:  1       Frequency:  Daily  Entered: Mar 22 2024  5:22PM  

## 2024-03-26 NOTE — PROGRESS NOTE ADULT - PROBLEM SELECTOR PROBLEM 7
GERD (gastroesophageal reflux disease)
Prophylactic measure

## 2024-03-26 NOTE — PROVIDER CONTACT NOTE (OTHER) - BACKGROUND
Patient admitted with fever. Hx of cad, hld, bph, depression, gerd, and cancer
patient admitted for sepsis, DVT on eliquis
Patient admitted with fever. Hx of cad, hld, bph, depression, gerd, and cancer
Patient admitted with fever. Hx of cad, hld, bph, depression, gerd, and cancer
Hypotension.
patient admitted for sepsis secondary to UTI, acute DVT
pt admitted for fever

## 2024-03-26 NOTE — PROGRESS NOTE ADULT - PROBLEM SELECTOR PLAN 4
R ilicus hematoma on MRA  Vascular consulted - no acute intervention     Plan  - Repeat OP MR imaging

## 2024-03-26 NOTE — PROVIDER CONTACT NOTE (OTHER) - RECOMMENDATIONS
Roberto De Leon notified
notify MD
notify md to contact urology
Fluids.
Roberto De Leon notified
notify MD
Roberto De Leon notified

## 2024-03-26 NOTE — PROGRESS NOTE ADULT - TIME BILLING
review of laboratory data, radiology results, consultants' recommendations, documentation in [place], discussion with patient/ACP and interdisciplinary staff (such as , social workers, etc). Interventions were performed as documented above.
review of laboratory data, radiology results, consultants' recommendations, documentation in [place], discussion with patient/ACP and interdisciplinary staff (such as , social workers, etc). Interventions were performed as documented above.
review of laboratory data, radiology results, consultants' recommendations, documentation in [place], discussion with patient/ACP and interdisciplinary staff (such as , social workers, etc). Interventions were performed as documented above.

## 2024-03-26 NOTE — PROGRESS NOTE ADULT - PROBLEM SELECTOR PLAN 2
Acute above knee LLE DVT. Provoked iso known metastatic prostate cancer  - Duplex showing nonocclusive thrombus within the left common femoral vein, femoral vein, and deep femoral vein. The popliteal vein is patent.    Plan  - s/p Lovenox 60mg BID  - Transitioned to Eliquis 5mg BID  - To be discharged on AC despite illiacus hematoma

## 2024-03-26 NOTE — PROGRESS NOTE ADULT - SUBJECTIVE AND OBJECTIVE BOX
PROGRESS NOTE:   Authored by Dr. Adolfo Glass MD (PGY-1)  Patient is a 83y old  Male who presents with a chief complaint of chills (25 Mar 2024 08:09)    SUBJECTIVE / OVERNIGHT EVENTS:  No acute events overnight.     MEDICATIONS  (STANDING):  apixaban 5 milliGRAM(s) Oral every 12 hours  aspirin enteric coated 81 milliGRAM(s) Oral daily  finasteride 5 milliGRAM(s) Oral daily  oxybutynin 5 milliGRAM(s) Oral three times a day  pantoprazole    Tablet 40 milliGRAM(s) Oral before breakfast  simvastatin 40 milliGRAM(s) Oral at bedtime  tamsulosin 0.4 milliGRAM(s) Oral at bedtime    MEDICATIONS  (PRN):  acetaminophen     Tablet .. 650 milliGRAM(s) Oral every 6 hours PRN Temp greater or equal to 38C (100.4F), Mild Pain (1 - 3)  melatonin 3 milliGRAM(s) Oral at bedtime PRN Insomnia   CAPILLARY BLOOD GLUCOSE     I&O's Summary    25 Mar 2024 07:01  -  26 Mar 2024 07:00  --------------------------------------------------------  IN: 210 mL / OUT: 610 mL / NET: -400 mL     PHYSICAL EXAM:  Vital Signs Last 24 Hrs  T(C): 36.8 (26 Mar 2024 05:31), Max: 36.8 (25 Mar 2024 09:45)  T(F): 98.2 (26 Mar 2024 05:31), Max: 98.2 (25 Mar 2024 09:45)  HR: 79 (26 Mar 2024 05:31) (78 - 88)  BP: 108/60 (26 Mar 2024 05:31) (102/64 - 110/69)  BP(mean): --  RR: 17 (26 Mar 2024 05:31) (17 - 19)  SpO2: 97% (26 Mar 2024 05:31) (97% - 98%)    Parameters below as of 26 Mar 2024 05:31  Patient On (Oxygen Delivery Method): room air    CONSTITUTIONAL: NAD, well-developed  RESPIRATORY: Normal respiratory effort; lungs are clear to auscultation bilaterally  CARDIOVASCULAR: Regular rate and rhythm, normal S1 and S2, no murmur/rub/gallop; No lower extremity edema; Peripheral pulses are 2+ bilaterally  ABDOMEN: Nontender to palpation, normoactive bowel sounds, no rebound/guarding; No hepatosplenomegaly  MUSCLOSKELETAL: no clubbing or cyanosis of digits; no joint swelling or tenderness to palpation  PSYCH: A+O to person, place, and time; affect appropriate    LABS:                        9.1    6.89  )-----------( 328      ( 25 Mar 2024 06:05 )             27.5   03-25    139  |  105  |  19  ----------------------------<  117<H>  3.8   |  25  |  0.97    Ca    8.7      25 Mar 2024 06:05  Phos  3.2     03-25  Mg     2.10     03-25      Urinalysis Basic - ( 25 Mar 2024 06:05 )    Color: x / Appearance: x / SG: x / pH: x  Gluc: 117 mg/dL / Ketone: x  / Bili: x / Urobili: x   Blood: x / Protein: x / Nitrite: x   Leuk Esterase: x / RBC: x / WBC x   Sq Epi: x / Non Sq Epi: x / Bacteria: x    A1C with Estimated Average Glucose Result: 4.9 % (03-16-24 @ 09:50)  A1C with Estimated Average Glucose Result: 5.4 % (12-25-23 @ 07:20)    MICROBIOLOGY:    PROGRESS NOTE:   Authored by Dr. Adolfo Glass MD (PGY-1)  Patient is a 83y old  Male who presents with a chief complaint of chills (25 Mar 2024 08:09)    SUBJECTIVE / OVERNIGHT EVENTS:  No acute events overnight.   - No acute complaints in AM. Unsure why JONAH was denied by insurance. Aware appeal in process    MEDICATIONS  (STANDING):  apixaban 5 milliGRAM(s) Oral every 12 hours  aspirin enteric coated 81 milliGRAM(s) Oral daily  finasteride 5 milliGRAM(s) Oral daily  oxybutynin 5 milliGRAM(s) Oral three times a day  pantoprazole    Tablet 40 milliGRAM(s) Oral before breakfast  simvastatin 40 milliGRAM(s) Oral at bedtime  tamsulosin 0.4 milliGRAM(s) Oral at bedtime    MEDICATIONS  (PRN):  acetaminophen     Tablet .. 650 milliGRAM(s) Oral every 6 hours PRN Temp greater or equal to 38C (100.4F), Mild Pain (1 - 3)  melatonin 3 milliGRAM(s) Oral at bedtime PRN Insomnia   CAPILLARY BLOOD GLUCOSE     I&O's Summary    25 Mar 2024 07:01  -  26 Mar 2024 07:00  --------------------------------------------------------  IN: 210 mL / OUT: 610 mL / NET: -400 mL     PHYSICAL EXAM:  Vital Signs Last 24 Hrs  T(C): 36.8 (26 Mar 2024 05:31), Max: 36.8 (25 Mar 2024 09:45)  T(F): 98.2 (26 Mar 2024 05:31), Max: 98.2 (25 Mar 2024 09:45)  HR: 79 (26 Mar 2024 05:31) (78 - 88)  BP: 108/60 (26 Mar 2024 05:31) (102/64 - 110/69)  BP(mean): --  RR: 17 (26 Mar 2024 05:31) (17 - 19)  SpO2: 97% (26 Mar 2024 05:31) (97% - 98%)    Parameters below as of 26 Mar 2024 05:31  Patient On (Oxygen Delivery Method): room air    CONSTITUTIONAL: NAD, well-developed  RESPIRATORY: Normal respiratory effort; lungs are clear to auscultation bilaterally  CARDIOVASCULAR: Regular rate and rhythm, normal S1 and S2, no murmur/rub/gallop; No lower extremity edema; Peripheral pulses are 2+ bilaterally  ABDOMEN: Nontender to palpation, normoactive bowel sounds, no rebound/guarding; No hepatosplenomegaly  MUSCLOSKELETAL: no clubbing or cyanosis of digits; no joint swelling or tenderness to palpation  PSYCH: A+O to person, place, and time; affect appropriate    LABS:                        9.1    6.89  )-----------( 328      ( 25 Mar 2024 06:05 )             27.5   03-25    139  |  105  |  19  ----------------------------<  117<H>  3.8   |  25  |  0.97    Ca    8.7      25 Mar 2024 06:05  Phos  3.2     03-25  Mg     2.10     03-25      Urinalysis Basic - ( 25 Mar 2024 06:05 )    Color: x / Appearance: x / SG: x / pH: x  Gluc: 117 mg/dL / Ketone: x  / Bili: x / Urobili: x   Blood: x / Protein: x / Nitrite: x   Leuk Esterase: x / RBC: x / WBC x   Sq Epi: x / Non Sq Epi: x / Bacteria: x    A1C with Estimated Average Glucose Result: 4.9 % (03-16-24 @ 09:50)  A1C with Estimated Average Glucose Result: 5.4 % (12-25-23 @ 07:20)    MICROBIOLOGY:

## 2024-03-26 NOTE — PROVIDER CONTACT NOTE (OTHER) - SITUATION
BP 80/40
Patient with bp 90/52 and denies symptoms.
patient had cramping in left leg
BP 84/48
patient had BM with blood
pt with browne urine coming out of penis area
BP 87/51

## 2024-03-26 NOTE — PROVIDER CONTACT NOTE (OTHER) - DATE AND TIME:
17-Mar-2024 05:27
23-Mar-2024 11:51
16-Mar-2024 17:00
26-Mar-2024 10:23
23-Mar-2024 21:19
16-Mar-2024 21:25
16-Mar-2024 22:12

## 2024-03-26 NOTE — PROVIDER CONTACT NOTE (OTHER) - NAME OF MD/NP/PA/DO NOTIFIED:
Roberto De Leon
Dr ARON Cooney
Fernando Mlils MD
MD Talon Weaver
Roberto De Leon
Roberto De Leon
Adolfo Glass MD

## 2024-03-26 NOTE — PROVIDER CONTACT NOTE (OTHER) - REASON
BP 84/48
BP 87/51
BP 80/4
Bp 90/52 w/o symptoms.
patient had BM with blood
patient had cramping in left leg
pt with leaking browne catheter

## 2024-03-26 NOTE — PROVIDER CONTACT NOTE (OTHER) - ACTION/TREATMENT ORDERED:
Awaiting orders
MD made aware. MD came to bedside to assess. MD ordered flexeril for pain.
md contacted urology
500 cc bolus
Awaiting orders
MD made aware. no new interventions noted.
Md was informed. Small bolus to be given.

## 2024-03-26 NOTE — PROGRESS NOTE ADULT - ASSESSMENT
82 y/o M with PMHx of HTN, HLD, CAD s/p PCI w/ stent, SAH, GERD, metastatic prostate cancer to bones/lung and local invasion to rectum, urinary retention with chronic browne, recent admission to [facility] in December 2023 for proctitis and UTI (urine culture grew Pseudomonas) who presents from urologist Dr. Pedro' office with fever and chills, febrile and hypotensive,  UA positive, admitted for sepsis
82 y/o M with PMHx of HTN, HLD, CAD s/p PCI w/ stent, SAH, GERD, metastatic prostate cancer to bones/lung and local invasion to rectum p/w pyelonephritis and DVT of the L CFA. Patient was started on AC and an incidental 3.6cm right iliacus muscle hematoma was found. Vascular Surgery consulted.    Recommendations:  - No acute surgical intervention, no signs of phlegmasia (Montgomery score 0)  - No signs of arterial insufficiency/ palpable DPs b/l  - Continue with AC for DVTs  - Would obtain CTA/IR consult for drop in H/H or hemodynamic instability  - Surgery to sign off, call back with questions or concerns    C-Team  [id number]  
84 y/o M with PMHx of HTN, HLD, CAD s/p PCI w/ stent, SAH, GERD, metastatic prostate cancer to bones/lung and local invasion to rectum p/w pyelonephritis and DVT of the L CFA. Patient was started on AC and an incidental 3.6cm right iliacus muscle hematoma was found. Vascular Surgery consulted.    Recommendations:  - No acute surgical intervention, no signs of phlegmasia (Montgomery score 0)  - No signs of arterial insufficiency/ palpable DPs b/l  - Continue with AC for DVTs  - Would obtain CTA/IR consult for drop in H/H or hemodynamic instability  - Discussed plan with patient  - rest of care per primary team    C-Team  [id number]  
84 y/o M with PMHx of HTN, HLD, CAD s/p PCI w/ stent, SAH, GERD, metastatic prostate cancer to bones/lung and local invasion to rectum, urinary retention with chronic browne, recent admission to [facility] in December 2023 for proctitis and UTI (urine culture grew Pseudomonas) who presents from urologist Dr. Pedro' office with fever and chills, febrile and hypotensive,  UA positive, admitted for sepsis s/p 10-day course of abx pending JONAH
82 y/o M with PMHx of HTN, HLD, CAD s/p PCI w/ stent, SAH, GERD, metastatic prostate cancer to bones/lung and local invasion to rectum, urinary retention with chronic browne, recent admission to [facility] in December 2023 for proctitis and UTI (urine culture grew Pseudomonas) who presents from urologist Dr. Pedro' office with fever and chills, febrile and hypotensive,  UA positive, admitted for sepsis
84 y/o M with PMHx of HTN, HLD, CAD s/p PCI w/ stent, SAH, GERD, metastatic prostate cancer to bones/lung and local invasion to rectum, urinary retention with chronic browne, recent admission to [facility] in December 2023 for proctitis and UTI (urine culture grew Pseudomonas) who presents from urologist Dr. Pedro' office with fever and chills, febrile and hypotensive,  UA positive, admitted for sepsis
82 y/o M with PMHx of HTN, HLD, CAD s/p PCI w/ stent, SAH, GERD, metastatic prostate cancer to bones/lung and local invasion to rectum, urinary retention with chronic browne, recent admission to [facility] in December 2023 for proctitis and UTI (urine culture grew Pseudomonas) who presents from urologist Dr. Pedro' office with fever and chills, febrile and hypotensive,  UA positive, admitted for sepsis s/p 10-day course of abx pending JONAH
84 y/o M with PMHx of HTN, HLD, CAD s/p PCI w/ stent, SAH, GERD, metastatic prostate cancer to bones/lung and local invasion to rectum, urinary retention with chronic browne, recent admission to [facility] in December 2023 for proctitis and UTI (urine culture grew Pseudomonas) who presents from urologist Dr. Pedro' office with fever and chills, febrile and hypotensive,  UA positive, admitted for sepsis
84 y/o M with PMHx of HTN, HLD, CAD s/p PCI w/ stent, SAH, GERD, metastatic prostate cancer to bones/lung and local invasion to rectum, urinary retention with chronic browne, recent admission to [facility] in December 2023 for proctitis and UTI (urine culture grew Pseudomonas) who presents from urologist Dr. Pedro' office with fever and chills, febrile and hypotensive,  UA positive, admitted for sepsis
84 y/o M with PMHx of HTN, HLD, CAD s/p PCI w/ stent, SAH, GERD, metastatic prostate cancer to bones/lung and local invasion to rectum, urinary retention with chronic browne, recent admission to [facility] in December 2023 for proctitis and UTI (urine culture grew Pseudomonas) who presents from urologist Dr. Pedro' office with fever and chills, febrile and hypotensive,  UA positive, admitted for sepsis
82 y/o M with PMHx of HTN, HLD, CAD s/p PCI w/ stent, SAH, GERD, metastatic prostate cancer to bones/lung and local invasion to rectum, urinary retention with chronic browne, recent admission to [facility] in December 2023 for proctitis and UTI (urine culture grew Pseudomonas) who presents from urologist Dr. Pedro' office with fever and chills, febrile and hypotensive,  UA positive, admitted for sepsis
84 y/o M with PMHx of HTN, HLD, CAD s/p PCI w/ stent, SAH, GERD, metastatic prostate cancer to bones/lung and local invasion to rectum, urinary retention with chronic browne, recent admission to [facility] in December 2023 for proctitis and UTI (urine culture grew Pseudomonas) who presents from urologist Dr. Pedro' office with fever and chills, febrile and hypotensive,  UA positive, admitted for sepsis
82 y/o M with PMHx of HTN, HLD, CAD s/p PCI w/ stent, SAH, GERD, metastatic prostate cancer to bones/lung and local invasion to rectum, urinary retention with chronic browne, recent admission to [facility] in December 2023 for proctitis and UTI (urine culture grew Pseudomonas) who presents from urologist Dr. Pedro' office with fever and chills, febrile and hypotensive,  UA positive, admitted for sepsis
82 y/o M with PMHx of HTN, HLD, CAD s/p PCI w/ stent, SAH, GERD, metastatic prostate cancer to bones/lung and local invasion to rectum, urinary retention with chronic browne, recent admission to [facility] in December 2023 for proctitis and UTI (urine culture grew Pseudomonas) who presents from urologist Dr. Pedro' office with fever and chills, febrile and hypotensive,  UA positive, admitted for sepsis

## 2024-03-26 NOTE — PROGRESS NOTE ADULT - ATTENDING COMMENTS
Pt is an 84 yo M with PMH urinary retention (chronic brwone), metastatic prostate CA (bones, lung, rectum; on chemo), HTN, HLD, CAD, SAH, GERD, and depression p/w rash and rigors. Recently admitted to [facility] for proctitis and UTI 12/2023. Here, found to have +UA with CT a/p c/w pyelonephritis on IV CTX x10d course. Course c/b adrenal suppression s/p steroids, +LE dopplers for DVT now on eliquis, and MR a/p +R iliacus hematoma for which vascular was consulted recommending no intervention, okay to continue eliquis, and plan for repeat imaging outpt. Pt seen and examined at bedside resting comfortably, denies complaints and says he is eager to be DC today. Discussed with HS, rest as outlined above.

## 2024-03-26 NOTE — PROGRESS NOTE ADULT - PROBLEM SELECTOR PLAN 1
- Likely 2/2 UTI. Hx of chronic browne since dx of prostate cancer, last urine culture grew pseudomonas, s/p abx steroid, and fluid in the ED, likely UTI/pyelonephritis   -TTE 2023 EF 71%     Plan  - UCx showing E coli  - Antibiotics: Completed 10-day course. Initially on Zosyn --> Meropenem --> CTX (03/15 - 03/24)  - s/p Solu-Cortef 50mg Taper  - US kidney w/ R renal pole lesion, indeterminate MRI results; OP MRI  - monitor VS

## 2024-03-26 NOTE — PROGRESS NOTE ADULT - PROVIDER SPECIALTY LIST ADULT
Internal Medicine
Vascular Surgery
Internal Medicine
Internal Medicine
Vascular Surgery
Internal Medicine

## 2024-03-26 NOTE — PROVIDER CONTACT NOTE (OTHER) - ASSESSMENT
patient had BM with blood. no acute distress noted. patient denied pain with BM, but c/o straining. no hx of hemorrhoids. /60, HR 86, O2 97%, resp 18. patient had a large, soft BM with streak of red blood. no acute distress noted. patient denied pain with BM, but c/o straining. no hx of hemorrhoids. /60, HR 86, O2 97%, resp 18. no other s/s of bleeding at this time

## 2024-03-26 NOTE — PROGRESS NOTE ADULT - PROBLEM SELECTOR PLAN 8
- Fluids: -   - Electrolytes: Will replete to maintain K>4, Phos>3, and Mag>2  - Nutrition: DASH  - Activity: as tolerated   - DVT Prophylaxis: Eliquis 5mg BID  - Stress Ulcer/GI Prophylaxis: pantoprazole   - Disposition: JONAH; f/u [facility]   - Ethics: full code

## 2024-04-26 RX ORDER — FINASTERIDE 5 MG/1
5 TABLET, FILM COATED ORAL
Qty: 90 | Refills: 0 | Status: ACTIVE | COMMUNITY
Start: 2019-10-15 | End: 1900-01-01

## 2024-04-26 RX ORDER — SIMVASTATIN 40 MG/1
40 TABLET, FILM COATED ORAL
Qty: 90 | Refills: 0 | Status: ACTIVE | COMMUNITY
Start: 2019-09-19 | End: 1900-01-01

## 2024-04-26 RX ORDER — TAMSULOSIN HYDROCHLORIDE 0.4 MG/1
0.4 CAPSULE ORAL
Qty: 90 | Refills: 0 | Status: ACTIVE | COMMUNITY
Start: 2019-10-25 | End: 1900-01-01

## 2024-04-26 RX ORDER — PANTOPRAZOLE 40 MG/1
40 TABLET, DELAYED RELEASE ORAL
Qty: 30 | Refills: 2 | Status: ACTIVE | COMMUNITY
Start: 2019-09-19 | End: 1900-01-01

## 2024-05-28 RX ORDER — PREDNISONE 5 MG/1
5 TABLET ORAL TWICE DAILY
Qty: 60 | Refills: 5 | Status: ACTIVE | COMMUNITY
Start: 2023-12-15 | End: 1900-01-01

## 2024-05-28 RX ORDER — ABIRATERONE ACETATE 500 MG/1
500 TABLET, FILM COATED ORAL
Qty: 60 | Refills: 5 | Status: ACTIVE | COMMUNITY
Start: 2023-11-13 | End: 1900-01-01

## 2024-05-28 RX ORDER — ABIRATERONE ACETATE 250 MG/1
250 TABLET, FILM COATED ORAL DAILY
Qty: 120 | Refills: 5 | Status: ACTIVE | COMMUNITY
Start: 2024-05-28 | End: 1900-01-01
